# Patient Record
Sex: FEMALE | Race: WHITE | Employment: UNEMPLOYED | URBAN - METROPOLITAN AREA
[De-identification: names, ages, dates, MRNs, and addresses within clinical notes are randomized per-mention and may not be internally consistent; named-entity substitution may affect disease eponyms.]

---

## 2017-01-06 ENCOUNTER — HOSPITAL ENCOUNTER (OUTPATIENT)
Dept: LAB | Age: 77
Discharge: HOME | End: 2017-01-06

## 2017-07-19 RX ORDER — LISINOPRIL AND HYDROCHLOROTHIAZIDE 12.5; 2 MG/1; MG/1
TABLET ORAL
Qty: 90 TAB | Refills: 0 | Status: SHIPPED | OUTPATIENT
Start: 2017-07-19 | End: 2018-12-17 | Stop reason: SDUPTHER

## 2018-11-23 RX ORDER — LISINOPRIL AND HYDROCHLOROTHIAZIDE 12.5; 2 MG/1; MG/1
TABLET ORAL
Qty: 90 TAB | Refills: 0 | OUTPATIENT
Start: 2018-11-23

## 2018-11-23 NOTE — TELEPHONE ENCOUNTER
Called patient and spoke with her . Informed him that patient needed an appt to refill her medications and offered appts today, Monday or Tuesday. Patient's  states he will let her know and have her call back to schedule this appt.

## 2018-12-18 RX ORDER — LISINOPRIL AND HYDROCHLOROTHIAZIDE 12.5; 2 MG/1; MG/1
TABLET ORAL
Qty: 90 TAB | Refills: 0 | Status: SHIPPED | OUTPATIENT
Start: 2018-12-18 | End: 2020-11-16 | Stop reason: SDUPTHER

## 2020-07-31 ENCOUNTER — VIRTUAL VISIT (OUTPATIENT)
Dept: ONCOLOGY | Age: 80
End: 2020-07-31

## 2020-07-31 DIAGNOSIS — D72.828 OTHER ELEVATED WHITE BLOOD CELL (WBC) COUNT: ICD-10-CM

## 2020-07-31 DIAGNOSIS — R53.83 FATIGUE, UNSPECIFIED TYPE: ICD-10-CM

## 2020-07-31 DIAGNOSIS — I10 ESSENTIAL HYPERTENSION: ICD-10-CM

## 2020-07-31 DIAGNOSIS — D72.9 NEUTROPHILIA: ICD-10-CM

## 2020-07-31 DIAGNOSIS — Z00.00 HEALTHCARE MAINTENANCE: ICD-10-CM

## 2020-07-31 DIAGNOSIS — D72.828 OTHER ELEVATED WHITE BLOOD CELL (WBC) COUNT: Primary | ICD-10-CM

## 2020-07-31 NOTE — PROGRESS NOTES
[address] [facility] Oncology at [facility]  [phone]    Hematology / Oncology Consult    Reason for Visit:   Cinthya Talavera is a 68 y.o. female who is seen by synchronous (real-time) audio-video technology on 7/31/2020 in consultation at the request of Dr. Nader Moy for evaluation of elevated WBC. History of Present Illness:   Cinthya Talavera is a 68 y.o. female with HTN, Hyperlipidemia, allergic rhinitis, GERD comes in for evaluation of leukocytosis. Per PCP notes, pt has had fatigue for past 4 months. Recently was treated with UTI in late June 2020 treated with Amoxicillin. She also developed a rash in late June, which was treated steroid ointment, but no oral Prednisone. Dermatologist felt this was a heat rash. She does endorse yeast type rash under breast or in groin which comes and goes, not new. No unintentional weight loss, good appetite. No fevers, chills, night sweats. Had a recent chest CT which was negative for PE. Back in Feb 2020, she felt severely exhausted for 1 week, and the recovered. She wondered if she had COVID19 virus. Outside labs:   1/21/20: WBC 10.6, Hgb 13.8, MCV 92,   7/20/20: WBC 20.5, Hgb 13, MCV 91,   7/27/20: WBC 17.3, Hgb 12.4, MCV 95,     Past Medical History:   Diagnosis Date    Hypertension       Past Surgical History:   Procedure Laterality Date    HX HEENT      Appendectomy    HX ORTHOPAEDIC      Bilateral hip replacement   Had total hysterectomy for uterine prolapse in 2006 with pathology showing squamous metaplasia of cervix. Social History     Tobacco Use    Smoking status: Former Smoker    Smokeless tobacco: Never Used   Substance Use Topics    Alcohol use: Not Currently      No family history on file.   Current Outpatient Medications   Medication Sig    lisinopril-hydroCHLOROthiazide (PRINZIDE, ZESTORETIC) 20-12.5 mg per tablet TAKE 1 TABLET EVERY MORNING (NEED LAB WORK IN NEXT 3 MONTHS, PLEASE SCHEDULE) No current facility-administered medications for this visit. Not on File     Review of Systems: A complete review of systems was obtained, negative except as described above. Physical Exam:     Due to this being a TeleHealth evaluation, many elements of the physical examination are unable to be assessed. Constitutional: Appears well-developed and well-nourished in no apparent distress   Mental status: Alert and awake, Oriented to person/place/time, Able to follow commands  Eyes: EOM normal, Sclera normal, No visible ocular discharge  HENT: Normocephalic, atraumatic; Mouth/Throat: Moist mucous membranes, External Ears normal  Neck: No visualized mass  Pulmonary/Chest: Respiratory effort normal, No visualized signs of difficulty breathing or respiratory distress   Musculoskeletal: Normal gait with no signs of ataxia, Normal range of motion of neck  Neurological: No facial asymmetry (Cranial nerve 7 motor function), No gaze palsy  Skin: No significant exanthematous lesions or discoloration noted on facial skin  Psychiatric: Normal affect, normal judgment/insight. No hallucinations       Results:   No results found for: WBC, HGB, HCT, PLT, MCV, ANEU, HGBPOC, HCTPOC, HGBEXT, HCTEXT, PLTEXT  No results found for: NA, K, CL, CO2, GLU, BUN, CREA, GFRAA, GFRNA, CA, NAPOC, KPOCT, CLPOC, GLUCPOC, IBUN, CREAPOC, ICAI  No results found for: TBILI, ALT, AP, TP, ALB, GLOB  No results found for: IRON, FE, TIBC, IBCT, PSAT, FERR    No results found for: B12LT, FOL, RBCF  No results found for: TSH, TSH2, TSH3, TSHP, TSHEXT  No results found for: HAMAT, HAAB, HABT, HAAT, HBSAG, HBSB, HBSAT, HBABN, HBCM, HBCAB, HBCAT, XBCABS, HBEAB, HBEAG, XHEPCS, [id number], [facility], [facility], [facility], [id number], [id number], [address] [place] [address], [id number], HBCMLT, [id number], HCGAT      Imaging:     Radiology report(s) reviewed. .    Assessment & Plan:   Cinthya Talavera is a 68 y.o. female is seen for evaluation of leukocytosis.      1. Leukocytosis: Mostly neutrophils, with normal Hgb and PLT counts. No weight loss, fevers, sweats, but does have fatigue. While I feel this is most likely reactive due to inflammation or infection, it would be worthwhile to evaluate for myeloproliferative disorders. Have asked pt to do additional blood work at [address] [place] [address], peripheral smear, BCR/ABL, JAK2, ESR, CRP, LDH  -- Consider abdominal ultrasound in the future if WBC not improving.  -- Return in 3 weeks for virtual visit or in person to review results. 2. Fatigue: Unclear etiology, normal Hgb. Could be due to a viral illness, but no other clear symptoms. 3. HTN: Usually well controlled on current regimen. 4. Health maintenance: Last colonoscopy done 2016. Last mammogram 5/21/19. I appreciate the opportunity to participate in Ms. Florence oconnor. I was in the office while conducting this encounter. The patient was at her at home    Consent:  She and/or her healthcare decision maker is aware that this patient-initiated Telehealth encounter is a billable service, with coverage as determined by her insurance carrier. She is aware that she may receive a bill and has provided verbal consent to proceed: Yes    Pursuant to the emergency declaration under the [address] and the [facility], 1135 waiver authority and the [employer] Act, this Virtual  Visit was conducted, with patient's (and/or legal guardian's) consent, to reduce the patient's risk of exposure to COVID-19 and provide necessary medical care. Services were provided through a video synchronous discussion virtually to substitute for in-person visit.       Signed By: Yobani Talavera MD     July 31, 2020

## 2020-08-06 ENCOUNTER — TELEPHONE (OUTPATIENT)
Dept: ONCOLOGY | Age: 80
End: 2020-08-06

## 2020-08-06 NOTE — TELEPHONE ENCOUNTER
This came by email from Ashish Sandoval:      Maybe you could help me provide some information to Dr. Yuki Thomas that came up   during her tele visit with my wife Darian Denton. The medication was Nitrofurantoin that caused a allergic reaction when Alessia Ramos took it. Dr. Tricia Dubin nurse asked the question before the meeting but we had to look it up later. The second item was asked by Dr. Yuki Thomas regarding steroid use  and Alessia Ramos did receive   a prescription for Methyl-prednisolone 4mg dose pack on April 9 2019. Would appreciate it if you could get this to the Doctor or give me an address and I send it.        # [phone]

## 2020-08-17 LAB
BACKGROUND, 081113: NORMAL
BACKGROUND: 480503: NORMAL
BASOPHILS # BLD AUTO: 1.7 X10E3/UL (ref 0–0.2)
BASOPHILS NFR BLD AUTO: 9 %
CALR MUTATION DETECTION RESULT, 489451: NORMAL
CELLS ANALYZED: 100
CELLS COUNTED: 100
CHROM ANALY RESULT (ISCN): NORMAL
CLINICAL CYTOGENETICIST SPEC: NORMAL
CRP SERPL-MCNC: 2 MG/L (ref 0–10)
DIAGNOSTIC IMP SPEC-IMP: NORMAL
EOSINOPHIL # BLD AUTO: 0.7 X10E3/UL (ref 0–0.4)
EOSINOPHIL NFR BLD AUTO: 4 %
ERYTHROCYTE [DISTWIDTH] IN BLOOD BY AUTOMATED COUNT: 13.8 % (ref 11.7–15.4)
ERYTHROCYTE [SEDIMENTATION RATE] IN BLOOD BY WESTERGREN METHOD: 14 MM/HR (ref 0–40)
EXTRACTION: NORMAL
HCT VFR BLD AUTO: 37.3 % (ref 34–46.6)
HGB BLD-MCNC: 12.3 G/DL (ref 11.1–15.9)
IMMATURE CELLS, 115398: ABNORMAL
JAK2 P.V617F BLD/T QL: NORMAL
LAB DIRECTOR NAME PROVIDER: NORMAL
LDH SERPL-CCNC: 299 IU/L (ref 119–226)
LYMPHOCYTES # BLD AUTO: 2.2 X10E3/UL (ref 0.7–3.1)
LYMPHOCYTES NFR BLD AUTO: 12 %
MCH RBC QN AUTO: 30.3 PG (ref 26.6–33)
MCHC RBC AUTO-ENTMCNC: 33 G/DL (ref 31.5–35.7)
MCV RBC AUTO: 92 FL (ref 79–97)
METAMYELOCYTES NFR BLD: 3 % (ref 0–0)
MONOCYTES # BLD AUTO: 0 X10E3/UL (ref 0.1–0.9)
MONOCYTES NFR BLD AUTO: 0 %
MORPHOLOGY BLD-IMP: ABNORMAL
MPL GENE MUT TESTED BLD/T: NORMAL
MPL P.W515L+W515K+S505N BLD/T QL: NORMAL
NEUTROPHILS # BLD AUTO: 13.4 X10E3/UL (ref 1.4–7)
NEUTROPHILS NFR BLD AUTO: 72 %
PLATELET # BLD AUTO: 255 X10E3/UL (ref 150–450)
RBC # BLD AUTO: 4.06 X10E6/UL (ref 3.77–5.28)
REF LAB TEST METHOD: NORMAL
REFERENCES, 150293: NORMAL
REFERENCES, 150293: NORMAL
REFLEX: NORMAL
SERVICE CMNT-IMP: NORMAL
SPECIMEN SOURCE: NORMAL
WBC # BLD AUTO: 18.6 X10E3/UL (ref 3.4–10.8)

## 2020-08-18 NOTE — TELEPHONE ENCOUNTER
[facility] at [facility]  [phone]        08/18/20 9:26 AM Attempted to call patient via home number listed. Someone picked up phone, but then call went to Arnaud. \" Will attempt to call again later if no return call. Patient currently scheduled for follow up with Dr. Geo Price on 09/08. Dr. Geo Price has received some additional results and would like to see patient sooner--Thursday, 08/20 at 2 PM. Can also offer any other available time that day, except for 8 AM.     08/19/20 9:39 AM Spoke with patient and informed of above. She is agreeable to office visit tomorrow, patient scheduled. No further questions or concerns at this time.

## 2020-08-19 NOTE — PROGRESS NOTES
[address] [facility] Oncology at [address] [place] [address]  [phone]    Hematology / Oncology Established Visit    Reason for Visit:   Rogers Rodriguez is a 68 y.o. female who is seen for follow up of leukocytosis. PCP is Dr. Ayla Lane.     Hematology Oncology Treatment History:     Diagnosis: Chronic myelogenous leukemia (CML)    Stage: Pending    Pathology:   - 8/1/20 Peripheral blood BCR-ABL1 FISH: 84% of nuclei positive for BCR/ABL1 fusion    Prior Treatment: None    Current Treatment: pending    History of Present Illness:   Rogers Rodriguez is a 68 y.o. female with HTN, Hyperlipidemia, allergic rhinitis, GERD comes in for follow up of leukocytosis. Per PCP notes, pt has had fatigue for past 4 months. Recently was treated with UTI in late June 2020 treated with Amoxicillin. She also developed a rash in late June, which was treated steroid ointment, but no oral Prednisone. Dermatologist felt this was a heat rash. She does endorse yeast type rash under breast or in groin which comes and goes, not new. No unintentional weight loss, good appetite. No fevers, chills, night sweats. Had a recent chest CT which was negative for PE. Back in Feb 2020, she felt severely exhausted for 1 week, and the recovered. She wondered if she had COVID19 virus. Outside labs:   1/21/20: WBC 10.6, Hgb 13.8, MCV 92,   7/20/20: WBC 20.5, Hgb 13, MCV 91,   7/27/20: WBC 17.3, Hgb 12.4, MCV 95,     Interval History:  Patient is feeling well overall aside from some fatigue. No recent fevers, chills, sweats, weight loss, infections. Past Medical History:   Diagnosis Date    Hypertension       Past Surgical History:   Procedure Laterality Date    HX HEENT      Appendectomy    HX ORTHOPAEDIC      Bilateral hip replacement   Had total hysterectomy for uterine prolapse in 2006 with pathology showing squamous metaplasia of cervix.      Social History     Tobacco Use    Smoking status: Former Smoker    Smokeless tobacco: Never Used   Substance Use Topics    Alcohol use: Not Currently      No family history on file. Current Outpatient Medications   Medication Sig    lisinopril-hydroCHLOROthiazide (PRINZIDE, ZESTORETIC) 20-12.5 mg per tablet TAKE 1 TABLET EVERY MORNING (NEED LAB WORK IN NEXT 3 MONTHS, PLEASE SCHEDULE)     No current facility-administered medications for this visit. Not on File     Review of Systems: A complete review of systems was obtained, negative except as described above. Physical Exam:     Visit Vitals  /68 (BP 1 Location: Left arm, BP Patient Position: Sitting)   Pulse 66   Temp 97.8 °F (36.6 °C) (Temporal)   Ht 5' 1\" (1.549 m)   Wt 137 lb (62.1 kg)   SpO2 97%   BMI 25.89 kg/m²       ECOG PS: 1  General: Well developed, no acute distress  Eyes: PERRLA, EOMI, anicteric sclerae  HENT: Atraumatic, OP clear, TMs intact without erythema  Neck: Supple  Lymphatic: No cervical, supraclavicular, axillary or inguinal adenopathy  Respiratory: CTAB, normal respiratory effort  CV: Normal rate, regular rhythm, no murmurs, no peripheral edema  GI: Soft, nontender, nondistended, no masses, no hepatomegaly, no splenomegaly  MS: Normal gait and station. Digits without clubbing or cyanosis. Skin: No rashes, ecchymoses, or petechiae. Normal temperature, turgor, and texture. Neuro/Psych: Alert, oriented. 5/5 strength in all 4 extremities. Appropriate affect, normal judgment/insight      Results:     Lab Results   Component Value Date/Time    WBC 18.6 (H) 08/03/2020 01:23 PM    HGB 12.3 08/03/2020 01:23 PM    HCT 37.3 08/03/2020 01:23 PM    PLATELET 664 58/36/8490 01:23 PM    MCV 92 08/03/2020 01:23 PM    ABS.  NEUTROPHILS 13.4 (H) 08/03/2020 01:23 PM     No results found for: NA, K, CL, CO2, GLU, BUN, CREA, GFRAA, GFRNA, CA, NAPOC, KPOCT, CLPOC, GLUCPOC, IBUN, CREAPOC, ICAI  No results found for: TBILI, ALT, AP, TP, ALB, GLOB  No results found for: IRON, FE, TIBC, IBCT, PSAT, FERR    No results found for: B12LT, FOL, RBCF  No results found for: TSH, TSH2, TSH3, TSHP, TSHEXT, TSHEXT  No results found for: HAMAT, HAAB, HABT, HAAT, HBSAG, HBSB, HBSAT, HBABN, HBCM, HBCAB, HBCAT, XBCABS, HBEAB, HBEAG, XHEPCS, [id number], [address] [facility], [facility], [address] [place] [address], [address] [place] [address], [id number], [id number], [facility], [id number], HBCMLT, [id number], HCGAT      Imaging:     Radiology report(s) reviewed. .    Assessment & Plan:   Sol Infante is a 68 y.o. female is seen for evaluation of leukocytosis. 1. CML: Discussed with patient that this a slow growing leukemia and can be effectively treated with an oral medication in the tyrosine kinase inhibitor class of meds such as Imatinib, Dasatinib, etc. Side effects of these medications include nausea, diarrhea, constipation, rash, liver test abnormalities - most of which are generally troublesome in the first 1-2 months, but then can resolve or improve over time. Cardiovascular problems can occur after months or years of treatment. -- Printed patient information on CML for patient. -- Check BCR-ABL by PCR, CMP  -- Bone marrow biopsy  -- Abdominal ultrasound  -- Will plan to start Dasatinib in the near future. -- This medication should be taken with food. Avoid Tylenol, [place]'s wort and grapefruit juice. -- Return in 2-3 weeks for virtual visit to review results  -- Then return in early Oct 2020 for labs, MD visit    2. Fatigue: Likely due to CML. 3. HTN: Usually well controlled on current regimen. 4. Health maintenance: Last colonoscopy done 2016. Last mammogram 5/21/19. I appreciate the opportunity to participate in Ms. Stephens Officer care.       Signed By: Delia Hinojosa MD     August 20, 2020

## 2020-08-20 ENCOUNTER — OFFICE VISIT (OUTPATIENT)
Dept: ONCOLOGY | Age: 80
End: 2020-08-20
Payer: MEDICARE

## 2020-08-20 VITALS
HEART RATE: 66 BPM | OXYGEN SATURATION: 97 % | DIASTOLIC BLOOD PRESSURE: 68 MMHG | SYSTOLIC BLOOD PRESSURE: 158 MMHG | BODY MASS INDEX: 25.86 KG/M2 | HEIGHT: 61 IN | TEMPERATURE: 97.8 F | WEIGHT: 137 LBS

## 2020-08-20 DIAGNOSIS — C92.10 CML (CHRONIC MYELOID LEUKEMIA) (HCC): ICD-10-CM

## 2020-08-20 DIAGNOSIS — C92.10 CML (CHRONIC MYELOID LEUKEMIA) (HCC): Primary | ICD-10-CM

## 2020-08-20 LAB
ALBUMIN SERPL-MCNC: 4.2 G/DL (ref 3.5–5)
ALBUMIN/GLOB SERPL: 1.4 {RATIO} (ref 1.1–2.2)
ALP SERPL-CCNC: 61 U/L (ref 45–117)
ALT SERPL-CCNC: 29 U/L (ref 12–78)
ANION GAP SERPL CALC-SCNC: 6 MMOL/L (ref 5–15)
AST SERPL-CCNC: 18 U/L (ref 15–37)
BASOPHILS # BLD: 0.2 K/UL (ref 0–0.1)
BASOPHILS NFR BLD: 1 % (ref 0–1)
BILIRUB SERPL-MCNC: 0.3 MG/DL (ref 0.2–1)
BUN SERPL-MCNC: 15 MG/DL (ref 6–20)
BUN/CREAT SERPL: 24 (ref 12–20)
CALCIUM SERPL-MCNC: 10 MG/DL (ref 8.5–10.1)
CHLORIDE SERPL-SCNC: 102 MMOL/L (ref 97–108)
CO2 SERPL-SCNC: 29 MMOL/L (ref 21–32)
CREAT SERPL-MCNC: 0.62 MG/DL (ref 0.55–1.02)
DIFFERENTIAL METHOD BLD: ABNORMAL
EOSINOPHIL # BLD: 0.2 K/UL (ref 0–0.4)
EOSINOPHIL NFR BLD: 1 % (ref 0–7)
ERYTHROCYTE [DISTWIDTH] IN BLOOD BY AUTOMATED COUNT: 14.8 % (ref 11.5–14.5)
GLOBULIN SER CALC-MCNC: 2.9 G/DL (ref 2–4)
GLUCOSE SERPL-MCNC: 108 MG/DL (ref 65–100)
HCT VFR BLD AUTO: 39.6 % (ref 35–47)
HGB BLD-MCNC: 12.7 G/DL (ref 11.5–16)
IMM GRANULOCYTES # BLD AUTO: 0 K/UL
IMM GRANULOCYTES NFR BLD AUTO: 0 %
LYMPHOCYTES # BLD: 2.3 K/UL (ref 0.8–3.5)
LYMPHOCYTES NFR BLD: 11 % (ref 12–49)
MCH RBC QN AUTO: 30.6 PG (ref 26–34)
MCHC RBC AUTO-ENTMCNC: 32.1 G/DL (ref 30–36.5)
MCV RBC AUTO: 95.4 FL (ref 80–99)
MONOCYTES # BLD: 0.6 K/UL (ref 0–1)
MONOCYTES NFR BLD: 3 % (ref 5–13)
MYELOCYTES NFR BLD MANUAL: 3 %
NEUTS SEG # BLD: 17.2 K/UL (ref 1.8–8)
NEUTS SEG NFR BLD: 81 % (ref 32–75)
NRBC # BLD: 0 K/UL (ref 0–0.01)
NRBC BLD-RTO: 0 PER 100 WBC
PLATELET # BLD AUTO: 227 K/UL (ref 150–400)
PMV BLD AUTO: 11.7 FL (ref 8.9–12.9)
POTASSIUM SERPL-SCNC: 4.1 MMOL/L (ref 3.5–5.1)
PROT SERPL-MCNC: 7.1 G/DL (ref 6.4–8.2)
RBC # BLD AUTO: 4.15 M/UL (ref 3.8–5.2)
RBC MORPH BLD: ABNORMAL
SODIUM SERPL-SCNC: 137 MMOL/L (ref 136–145)
WBC # BLD AUTO: 21.2 K/UL (ref 3.6–11)

## 2020-08-20 PROCEDURE — G8432 DEP SCR NOT DOC, RNG: HCPCS | Performed by: INTERNAL MEDICINE

## 2020-08-20 PROCEDURE — G8400 PT W/DXA NO RESULTS DOC: HCPCS | Performed by: INTERNAL MEDICINE

## 2020-08-20 PROCEDURE — 99214 OFFICE O/P EST MOD 30 MIN: CPT | Performed by: INTERNAL MEDICINE

## 2020-08-20 PROCEDURE — 1101F PT FALLS ASSESS-DOCD LE1/YR: CPT | Performed by: INTERNAL MEDICINE

## 2020-08-20 PROCEDURE — G8427 DOCREV CUR MEDS BY ELIG CLIN: HCPCS | Performed by: INTERNAL MEDICINE

## 2020-08-20 PROCEDURE — G8419 CALC BMI OUT NRM PARAM NOF/U: HCPCS | Performed by: INTERNAL MEDICINE

## 2020-08-20 PROCEDURE — 1090F PRES/ABSN URINE INCON ASSESS: CPT | Performed by: INTERNAL MEDICINE

## 2020-08-20 PROCEDURE — G8536 NO DOC ELDER MAL SCRN: HCPCS | Performed by: INTERNAL MEDICINE

## 2020-08-20 RX ORDER — LANOLIN ALCOHOL/MO/W.PET/CERES
5000 CREAM (GRAM) TOPICAL DAILY
COMMUNITY

## 2020-08-20 RX ORDER — LANOLIN ALCOHOL/MO/W.PET/CERES
1 CREAM (GRAM) TOPICAL
COMMUNITY

## 2020-08-20 NOTE — PROGRESS NOTES
Felicita Hernandez is a 68 y.o. female here for follow up of leukocytosis. Patient with no complaints of pain at this time.

## 2020-08-26 LAB
BACKGROUND, BCR6T: NORMAL
INTERPRETATION: 480488: NORMAL
LAB DIRECTOR NAME PROVIDER: NORMAL
T(ABL1,BCR)B2A2/CONTROL BLD/T: 30.77 %
T(ABL1,BCR)B2A2/CONTROL BLD/T: NORMAL %
T(ABL1,BCR)B3A2/CONTROL BLD/T: 56.03 %
T(ABL1,BCR)E1A2/CONTROL BLD/T: 0.03 %

## 2020-08-26 NOTE — PROGRESS NOTES
[address] [facility] Oncology at [address] [place] [address]  [phone]    Hematology / Oncology Established Visit    Reason for Visit:   Shane Herrera is a 68 y.o. female who is seen by synchronous (real-time) audio-video technology on 9/10/2020 for follow up of CML. PCP is Dr. Caryn English.     Hematology Oncology Treatment History:     Diagnosis: Chronic myelogenous leukemia (CML)    Stage: Pending    Pathology:   - 8/1/20 Peripheral blood BCR-ABL1 FISH: 84% of nuclei positive for BCR/ABL1 fusion    -8/28/20 Bone marrow biopsy: Chronic myeloid leukemia, BCR-ABL1-positive, chronic phase   The bone marrow biopsy is 90% cellular and adequate for evaluation. The myeloid series appears hyperplastic and left-shifted with increased proportion of promyelocytes and myelocytes. Blasts do not appear increased. The erythroid series is reduced in number but progresses through the full maturation sequence without significant dysplasia. Megakaryocytes are increased in number with dwarf forms seen. Plasma cells and lymphocytes do not appear increased in number.   -Cytogenetics: Karyotype: 46,XX,t(9;22)(q34.1;q11.2)[20]; Interpretation: ABNORMAL FEMALE KARYOTYPE   The t(9;22)(q34.1;q11.2), resulting in formation of the [place] chromosome, is observed in 90-95% of cases with chronic myeloid leukemia, BCR-ABL1 positive. It is also observed in acute lymphoblastic leukemia/lymphoma and mixed phenotype acute leukemia (MPAL) with t(9;22)(q34.1;q11.2);BCR-ABL1 and in 1-2% of AML. These results should be nterpreted in the context of clinical and histopathologic findings. .    Prior Treatment: None    Current Treatment: To start Dasatinib 100mg PO daily 9/2020. History of Present Illness:   Shane Herrera is a 68 y.o. female with HTN, Hyperlipidemia, allergic rhinitis, GERD comes in for follow up of leukocytosis. Per PCP notes, pt has had fatigue for past 4 months.  Recently was treated with UTI in late June 2020 treated with Amoxicillin. She also developed a rash in late June, which was treated steroid ointment, but no oral Prednisone. Dermatologist felt this was a heat rash. She does endorse yeast type rash under breast or in groin which comes and goes, not new. No unintentional weight loss, good appetite. No fevers, chills, night sweats. Had a recent chest CT which was negative for PE. Back in Feb 2020, she felt severely exhausted for 1 week, and the recovered. She wondered if she had COVID19 virus. Outside labs:   1/21/20: WBC 10.6, Hgb 13.8, MCV 92,   7/20/20: WBC 20.5, Hgb 13, MCV 91,   7/27/20: WBC 17.3, Hgb 12.4, MCV 95,     Interval History:  Patient is feeling well overall aside from some fatigue. No recent fevers, chills, sweats, weight loss, infections. She is seen for review of BM biopsy results. Past Medical History:   Diagnosis Date    Hypertension       Past Surgical History:   Procedure Laterality Date    HX HEENT      Appendectomy    HX ORTHOPAEDIC      Bilateral hip replacement   Had total hysterectomy for uterine prolapse in 2006 with pathology showing squamous metaplasia of cervix. Social History     Tobacco Use    Smoking status: Former Smoker    Smokeless tobacco: Never Used   Substance Use Topics    Alcohol use: Not Currently      No family history on file. Current Outpatient Medications   Medication Sig    calcium citrate-vitamin d3 (CITRACAL+D) 315 mg-5 mcg (200 unit) tab Take 1 Tab by mouth daily (with breakfast).  cyanocobalamin 1,000 mcg tablet Take 5,000 mcg by mouth daily.  cranberry fruit concentrate (AZO CRANBERRY PO) Take  by mouth.  lisinopril-hydroCHLOROthiazide (PRINZIDE, ZESTORETIC) 20-12.5 mg per tablet TAKE 1 TABLET EVERY MORNING (NEED LAB WORK IN NEXT 3 MONTHS, PLEASE SCHEDULE)     No current facility-administered medications for this visit.        Allergies   Allergen Reactions    Macrobid [Nitrofurantoin Monohyd/M-Cryst] Other (comments) Red rash on arms and legs        Review of Systems: A complete review of systems was obtained, negative except as described above. Physical Exam:     There were no vitals taken for this visit. Due to this being a TeleHealth evaluation, many elements of the physical examination are unable to be assessed. Constitutional: Appears well-developed and well-nourished in no apparent distress   Mental status: Alert and awake, Oriented to person/place/time, Able to follow commands  Eyes: EOM normal, Sclera normal, No visible ocular discharge  HENT: Normocephalic, atraumatic; Mouth/Throat: Moist mucous membranes, External Ears normal  Neck: No visualized mass  Pulmonary/Chest: Respiratory effort normal, No visualized signs of difficulty breathing or respiratory distress   Musculoskeletal: Normal gait with no signs of ataxia, Normal range of motion of neck  Neurological: No facial asymmetry (Cranial nerve 7 motor function), No gaze palsy  Skin: No significant exanthematous lesions or discoloration noted on facial skin  Psychiatric: Normal affect, normal judgment/insight. No hallucinations       Results:     Lab Results   Component Value Date/Time    WBC 21.2 (H) 08/20/2020 02:56 PM    HGB 12.7 08/20/2020 02:56 PM    HCT 39.6 08/20/2020 02:56 PM    PLATELET 925 45/26/7774 02:56 PM    MCV 95.4 08/20/2020 02:56 PM    ABS. NEUTROPHILS 17.2 (H) 08/20/2020 02:56 PM     Lab Results   Component Value Date/Time    Sodium 137 08/20/2020 02:56 PM    Potassium 4.1 08/20/2020 02:56 PM    Chloride 102 08/20/2020 02:56 PM    CO2 29 08/20/2020 02:56 PM    Glucose 108 (H) 08/20/2020 02:56 PM    BUN 15 08/20/2020 02:56 PM    Creatinine 0.62 08/20/2020 02:56 PM    GFR est AA >60 08/20/2020 02:56 PM    GFR est non-AA >60 08/20/2020 02:56 PM    Calcium 10.0 08/20/2020 02:56 PM     Lab Results   Component Value Date/Time    Bilirubin, total 0.3 08/20/2020 02:56 PM    ALT (SGPT) 29 08/20/2020 02:56 PM    Alk.  phosphatase 61 08/20/2020 02:56 PM    Protein, total 7.1 2020 02:56 PM    Albumin 4.2 2020 02:56 PM    Globulin 2.9 2020 02:56 PM     No results found for: IRON, FE, TIBC, IBCT, PSAT, FERR    No results found for: B12LT, FOL, RBCF  No results found for: TSH, TSH2, TSH3, TSHP, TSHEXT, TSHEXT  No results found for: HAMAT, HAAB, HABT, HAAT, HBSAG, HBSB, HBSAT, HBABN, HBCM, HBCAB, HBCAT, XBCABS, [facility], [facility], XHEPCS, [id number], [facility], [facility], [facility], [facility], [id number], [id number], [address] [place] [address], [id number], HBCMLT, [id number], HCGAT     20: POSITIVE for the BCR-ABL1:  e13a2 (b2a2, p210) 30.77; e14a2 (b3a2, p210) 56.03; e1a2 (p190) 0.03 fusion transcripts. Imagin/28/20 abdomen ultrasound:  IMPRESSION: Hepatic steatosis. No evidence for hepatosplenomegaly    Assessment & Plan:   Jas Eric is a 68 y.o. female is seen for evaluation of leukocytosis. 1. CML: Sokal score intermediate. Discussed with patient that this a slow growing leukemia and can be effectively treated with an oral medication in the tyrosine kinase inhibitor class of meds such as Imatinib, Dasatinib, etc. Side effects of these medications include nausea, diarrhea, constipation, rash, liver test abnormalities, most of which are generally troublesome in the first 1-2 months, but then can resolve or improve over time. Cardiovascular problems can occur after months or years of treatment. Previously printed patient information on CML for patient. -- Start Dasatinib 100mg PO daily. Can consider dose escalation to 140mg/d if not achieving heme or cytogenetic response. (sent to [facility] [address]. )     -- This medication should be taken with food. Avoid Tylenol, [place]'s wort and grapefruit juice. -- Return in 4 weeks for labs and MD follow up  -- At the next visit, will order CBC, CMP, Bcr/abl PCR for 6 weeks later. 2. Fatigue: Likely due to CML and hopeful to improve. 3. HTN: Usually well controlled on current regimen.      4. Health maintenance: Last colonoscopy done 2016. Last mammogram 5/21/19. I appreciate the opportunity to participate in Ms. [facility]. Total physician time spent on this encounter was 40 minutes. I was in the office while conducting this encounter. The patient was at her at home    Consent:  She and/or her healthcare decision maker is aware that this patient-initiated Telehealth encounter is a billable service, with coverage as determined by her insurance carrier. She is aware that she may receive a bill and has provided verbal consent to proceed: Yes    Pursuant to the emergency declaration under the [address] and the [facility] 1135 waiver authority and the [employer] Act, this Virtual  Visit was conducted, with patient's (and/or legal guardian's) consent, to reduce the patient's risk of exposure to COVID-19 and provide necessary medical care. Services were provided through a video synchronous discussion virtually to substitute for in-person visit.       Signed By: Clemencia Ruffin MD     September 10, 2020

## 2020-08-28 ENCOUNTER — HOSPITAL ENCOUNTER (OUTPATIENT)
Dept: CT IMAGING | Age: 80
Discharge: HOME | End: 2020-08-28
Attending: INTERNAL MEDICINE
Payer: MEDICARE

## 2020-08-28 ENCOUNTER — HOSPITAL ENCOUNTER (OUTPATIENT)
Dept: ULTRASOUND IMAGING | Age: 80
Discharge: HOME | End: 2020-08-28
Attending: INTERNAL MEDICINE
Payer: MEDICARE

## 2020-08-28 VITALS
DIASTOLIC BLOOD PRESSURE: 41 MMHG | RESPIRATION RATE: 21 BRPM | HEART RATE: 73 BPM | OXYGEN SATURATION: 99 % | SYSTOLIC BLOOD PRESSURE: 109 MMHG

## 2020-08-28 DIAGNOSIS — C92.10 CML (CHRONIC MYELOID LEUKEMIA) (HCC): ICD-10-CM

## 2020-08-28 LAB
BASOPHILS # BLD: 0.8 K/UL (ref 0–0.1)
BASOPHILS NFR BLD: 4 % (ref 0–1)
DIFFERENTIAL METHOD BLD: ABNORMAL
EOSINOPHIL # BLD: 0.2 K/UL (ref 0–0.4)
EOSINOPHIL NFR BLD: 1 % (ref 0–7)
ERYTHROCYTE [DISTWIDTH] IN BLOOD BY AUTOMATED COUNT: 14.8 % (ref 11.5–14.5)
HCT VFR BLD AUTO: 40.2 % (ref 35–47)
HGB BLD-MCNC: 13.3 G/DL (ref 11.5–16)
IMM GRANULOCYTES # BLD AUTO: 0 K/UL
IMM GRANULOCYTES NFR BLD AUTO: 0 %
LYMPHOCYTES # BLD: 3.1 K/UL (ref 0.8–3.5)
LYMPHOCYTES NFR BLD: 16 % (ref 12–49)
MCH RBC QN AUTO: 30.9 PG (ref 26–34)
MCHC RBC AUTO-ENTMCNC: 33.1 G/DL (ref 30–36.5)
MCV RBC AUTO: 93.5 FL (ref 80–99)
MONOCYTES # BLD: 0.4 K/UL (ref 0–1)
MONOCYTES NFR BLD: 2 % (ref 5–13)
MYELOCYTES NFR BLD MANUAL: 3 %
NEUTS SEG # BLD: 14.5 K/UL (ref 1.8–8)
NEUTS SEG NFR BLD: 74 % (ref 32–75)
NRBC # BLD: 0 K/UL (ref 0–0.01)
NRBC BLD-RTO: 0 PER 100 WBC
PLATELET # BLD AUTO: 351 K/UL (ref 150–400)
PMV BLD AUTO: 10.7 FL (ref 8.9–12.9)
RBC # BLD AUTO: 4.3 M/UL (ref 3.8–5.2)
RBC MORPH BLD: ABNORMAL
WBC # BLD AUTO: 19.6 K/UL (ref 3.6–11)
WBC MORPH BLD: ABNORMAL

## 2020-08-28 PROCEDURE — 99152 MOD SED SAME PHYS/QHP 5/>YRS: CPT

## 2020-08-28 PROCEDURE — 88184 FLOWCYTOMETRY/ TC 1 MARKER: CPT

## 2020-08-28 PROCEDURE — 88237 TISSUE CULTURE BONE MARROW: CPT

## 2020-08-28 PROCEDURE — 88264 CHROMOSOME ANALYSIS 20-25: CPT

## 2020-08-28 PROCEDURE — 88311 DECALCIFY TISSUE: CPT

## 2020-08-28 PROCEDURE — 76700 US EXAM ABDOM COMPLETE: CPT

## 2020-08-28 PROCEDURE — 88313 SPECIAL STAINS GROUP 2: CPT

## 2020-08-28 PROCEDURE — 74011250636 HC RX REV CODE- 250/636: Performed by: INTERNAL MEDICINE

## 2020-08-28 PROCEDURE — 88185 FLOWCYTOMETRY/TC ADD-ON: CPT

## 2020-08-28 PROCEDURE — 74011250636 HC RX REV CODE- 250/636: Performed by: RADIOLOGY

## 2020-08-28 PROCEDURE — 38221 DX BONE MARROW BIOPSIES: CPT

## 2020-08-28 PROCEDURE — 85025 COMPLETE CBC W/AUTO DIFF WBC: CPT

## 2020-08-28 PROCEDURE — 88342 IMHCHEM/IMCYTCHM 1ST ANTB: CPT

## 2020-08-28 PROCEDURE — 36415 COLL VENOUS BLD VENIPUNCTURE: CPT

## 2020-08-28 PROCEDURE — 88305 TISSUE EXAM BY PATHOLOGIST: CPT

## 2020-08-28 RX ORDER — MIDAZOLAM HYDROCHLORIDE 1 MG/ML
.5-5 INJECTION, SOLUTION INTRAMUSCULAR; INTRAVENOUS
Status: DISCONTINUED | OUTPATIENT
Start: 2020-08-28 | End: 2020-08-28

## 2020-08-28 RX ORDER — FENTANYL CITRATE 50 UG/ML
100 INJECTION, SOLUTION INTRAMUSCULAR; INTRAVENOUS
Status: DISCONTINUED | OUTPATIENT
Start: 2020-08-28 | End: 2020-08-28

## 2020-08-28 RX ORDER — MIDAZOLAM HYDROCHLORIDE 5 MG/ML
5 INJECTION INTRAMUSCULAR; INTRAVENOUS
Status: DISCONTINUED | OUTPATIENT
Start: 2020-08-28 | End: 2020-08-28

## 2020-08-28 RX ADMIN — MIDAZOLAM HYDROCHLORIDE 1 MG: 1 INJECTION, SOLUTION INTRAMUSCULAR; INTRAVENOUS at 09:59

## 2020-08-28 RX ADMIN — FENTANYL CITRATE 25 MCG: 50 INJECTION, SOLUTION INTRAMUSCULAR; INTRAVENOUS at 10:08

## 2020-08-28 RX ADMIN — FENTANYL CITRATE 25 MCG: 50 INJECTION, SOLUTION INTRAMUSCULAR; INTRAVENOUS at 10:05

## 2020-08-28 RX ADMIN — FENTANYL CITRATE 25 MCG: 50 INJECTION, SOLUTION INTRAMUSCULAR; INTRAVENOUS at 09:59

## 2020-08-28 RX ADMIN — MIDAZOLAM HYDROCHLORIDE 1 MG: 1 INJECTION, SOLUTION INTRAMUSCULAR; INTRAVENOUS at 10:05

## 2020-08-28 RX ADMIN — MIDAZOLAM HYDROCHLORIDE 1 MG: 1 INJECTION, SOLUTION INTRAMUSCULAR; INTRAVENOUS at 10:08

## 2020-08-28 NOTE — PROGRESS NOTES
0900  Pt brought back to [facility] for scheduled CT guided marrow biopsy. Confirmed NPO and ride. 0915  IV placed and labs drawn and sent to lab. Irene Ramirez in [facility] to discuss procedure and sedation plan with pt,  and RN. Allergies reviewed. Consent signed. 0950 Pt brought to CT for procedure. Sedation started at 0959. Time out performed at  1008. Procedure started at 1009 and ended at 1018. Pt received a total of  3mg of versed and 75mcg of fentanyl over the course of procedure. Pt tolerated procedure well. Denies pain. Dressing to procedure site CDI. VS monitored throughout procedure. 1025  Pt returned to [facility]. Given crackers and coffee. [phone] with ,Harjit, to let him know procedure complete and pt doing well. 1110 Reviewed discharge instructions with patient. Opportunity given for questions. Pt verbalized understanding. Copy provided. IV removed. Pt ambulated to  to get dressed. 112  Escorted pt out to vehicle in w/c for discharge to home with .

## 2020-08-28 NOTE — DISCHARGE INSTRUCTIONS
Bone Marrow Aspiration and Biopsy: What to Expect at Home  Your Recovery  The biopsy site may feel sore for several days. It can help to walk, take pain medicine, and put ice packs on the site. You will probably be able to return to work and your usual activities the day after the procedure. Your doctor or nurse will call you with the results of your test.  This care sheet gives you a general idea about how long it will take for you to recover. But each person recovers at a different pace. Follow the steps below to get better as quickly as possible. How can you care for yourself at home? Activity    · Rest when you feel tired. Getting enough sleep will help you recover. · You may drive when you are no longer taking pain pills and can quickly move your foot from the gas pedal to the brake. You must also be able to sit comfortably for a long period of time, even if you do not plan to go far. You might get caught in traffic. · Most people are able to return to work the day after the procedure. Medicines    · Your doctor will tell you if and when you can restart your medicines. He or she will also give you instructions about taking any new medicines. · If you take blood thinners, such as warfarin (Coumadin), clopidogrel (Plavix), or aspirin, be sure to talk to your doctor. He or she will tell you if and when to start taking those medicines again. Make sure that you understand exactly what your doctor wants you to do. · Be safe with medicines. Take pain medicines exactly as directed. ? If the doctor gave you a prescription medicine for pain, take it as prescribed. ? If you are not taking a prescription pain medicine, take an over-the-counter medicine such as acetaminophen (Tylenol), ibuprofen (Advil, Motrin), or naproxen (Aleve). Read and follow all instructions on the label. ? Do not take two or more pain medicines at the same time unless the doctor told you to.  Many pain medicines have acetaminophen, which is Tylenol. Too much acetaminophen (Tylenol) can be harmful. · If you think your pain medicine is making you sick to your stomach:  ? Take your medicine after meals (unless your doctor has told you not to). ? Ask your doctor for a different pain medicine. · If your doctor prescribed antibiotics, take them as directed. Do not stop taking them just because you feel better. Ice    · Put ice or a cold pack on the biopsy site for 10 to 20 minutes at a time. Put a thin cloth between the ice and your skin. Follow-up care is a key part of your treatment and safety. Be sure to make and go to all appointments, and call your doctor if you are having problems. It's also a good idea to know your test results and keep a list of the medicines you take. When should you call for help? Call 911 anytime you think you may need emergency care. For example, call if:    · You passed out (lost consciousness). Call your doctor now or seek immediate medical care if:    · You have signs of infection, such as:  ? Increased pain, swelling, warmth, or redness. ? Red streaks leading from the biopsy site. ? Pus draining from the biopsy site. ? Swollen lymph nodes in your neck, armpits, or groin. ? A fever. Watch closely for any changes in your health, and be sure to contact your doctor if:    · You are not getting better as expected. Where can you learn more? Go to http://vince-divina.info/. Enter E148 in the search box to learn more about \"Bone Marrow Aspiration and Biopsy: What to Expect at Home. \"  Current as of: September 10, 2017  Content Version: 11.8  © 8171-0022 Healthwise, Incorporated. Care instructions adapted under license by [employer] (which disclaims liability or warranty for this information).  If you have questions about a medical condition or this instruction, always ask your healthcare professional. [facility] disclaims any warranty or liability for your use of this information. If you have any questions or concerns please call [employer] at [phone] between the hours of 7:00 am and 3:30 pm or after hours call [phone]. Julee Amador RN, [facility]  Sedation for a Medical Procedure: After Your Visit  Instructions. Sedatives are used to relax you for a procedure. You may or may not be awake during the procedure. Common side effects of sedation medications include:                   Drowsiness, dizziness, euphoria, sleepiness or confusion. I              Unsteady gait. Loss of fine muscle control and delayed reaction time. Visual disturbances, difficulty focusing and blurred vision. Impaired memory recall. Follow-up care is a key component for your health and safety. Be sure to make and go to all medical appointments. Call your doctor if you are having problems. It's also a good idea to keep a list of your allergies, medicines with doses and test results on hand    Home care following your sedation procedure: You may experience some of these side effects or you may not be aware of subtle changes in your behavior or reaction time. Because you received these medications, we are giving you the following instructions: Activity    For your safety, you should not drive until the medicine wears off and you can think clearly and react easily. Do not drive for 24 hours.  Rest when you feel tired. Getting enough sleep will help you recover. Diet     You can eat your normal diet. If your stomach is upset, try bland, low-fat foods like plain rice, broiled chicken, toast, and yogurt.  Drink plenty of fluids unless your doctor advised you to limit your fluids.  Do not consume alcoholic beverages for 24 hours. Instructions   Do not make important personal, business, or legal decisions for 24 hours.  Move slowly and carefully, do not make sudden position changes.    Be alert for dizziness or lightheadedness and move accordingly.  Have a responsible person assist you.  Do not drive for 24 hours.  Do not operate equipment for 24 hours. [employer] mowers, power tools, kitchen appliances, etc.)    Discharge medications:   Resume prior to test medications as prescribed by your personal physician. If you have any questions or concerns call Radiology RN at [phone]  After hours call Radiology on-call at [phone], [facility]      Call 911 any time you think you may need emergency care. For example:                Call if you passed out (lost consciousness).  The medicine is not wearing off and you cannot think clearly. Watch closely for changes in your health, and be sure to contact your doctor if you have any problems. Where can you learn more? Go to [employer]be   Enter G817 in the search box to learn more about \"Sedation for a Medical Procedure: After Your Visit. \"

## 2020-09-10 ENCOUNTER — VIRTUAL VISIT (OUTPATIENT)
Dept: ONCOLOGY | Age: 80
End: 2020-09-10
Payer: MEDICARE

## 2020-09-10 DIAGNOSIS — C92.10 CML (CHRONIC MYELOCYTIC LEUKEMIA) (HCC): Primary | ICD-10-CM

## 2020-09-10 DIAGNOSIS — R53.83 FATIGUE, UNSPECIFIED TYPE: ICD-10-CM

## 2020-09-10 DIAGNOSIS — D72.828 OTHER ELEVATED WHITE BLOOD CELL (WBC) COUNT: ICD-10-CM

## 2020-09-10 DIAGNOSIS — I10 ESSENTIAL HYPERTENSION: ICD-10-CM

## 2020-09-10 PROCEDURE — G8432 DEP SCR NOT DOC, RNG: HCPCS | Performed by: INTERNAL MEDICINE

## 2020-09-10 PROCEDURE — 1101F PT FALLS ASSESS-DOCD LE1/YR: CPT | Performed by: INTERNAL MEDICINE

## 2020-09-10 PROCEDURE — G8400 PT W/DXA NO RESULTS DOC: HCPCS | Performed by: INTERNAL MEDICINE

## 2020-09-10 PROCEDURE — 99215 OFFICE O/P EST HI 40 MIN: CPT | Performed by: INTERNAL MEDICINE

## 2020-09-10 PROCEDURE — G8427 DOCREV CUR MEDS BY ELIG CLIN: HCPCS | Performed by: INTERNAL MEDICINE

## 2020-09-10 PROCEDURE — 1090F PRES/ABSN URINE INCON ASSESS: CPT | Performed by: INTERNAL MEDICINE

## 2020-09-11 NOTE — TELEPHONE ENCOUNTER
[facility] at [place]  [phone]        09/11/20 3:56 PM Received correspondence from [facility] stating that they are unable to fill patient's Sprycel. [facility] advised that office contact [employer] at [phone] to verify which specialty pharmacy to send medication to. Unable to verify which specialty pharmacy to send medication--was transferred to two separate departments who advised they were unable to assist and this nurse on call for 40 minutes. This nurse was unable to continue call, will follow up on Monday.

## 2020-09-16 ENCOUNTER — TELEPHONE (OUTPATIENT)
Dept: ONCOLOGY | Age: 80
End: 2020-09-16

## 2020-09-16 NOTE — TELEPHONE ENCOUNTER
Left a vm that her medication espirica?  100 mg   Needs a PA  Phone [phone]    Ref number [id number]

## 2020-09-24 ENCOUNTER — TELEPHONE (OUTPATIENT)
Dept: ONCOLOGY | Age: 80
End: 2020-09-24

## 2020-09-24 DIAGNOSIS — C92.10 CML (CHRONIC MYELOCYTIC LEUKEMIA) (HCC): Primary | ICD-10-CM

## 2020-09-24 DIAGNOSIS — C92.10 CML (CHRONIC MYELOCYTIC LEUKEMIA) (HCC): ICD-10-CM

## 2020-09-24 NOTE — TELEPHONE ENCOUNTER
[address] at [place]  [phone]        09/24/20 4:06 PM Spoke with patient's , Vasiliy Runner (listed on PHI). He verified that patient has not received sprycel yet. Updated Junior Runner that printed prescription was signed and forwarded to Yohannes Ortega, case management assistant, to submit to drug company. Junior Runner requesting update and inquiring what to anticipate from here regarding delivery of medication. Advised this nurse would forward above to Yohannes Ortega for return call. He verbalized understanding.

## 2020-09-24 NOTE — TELEPHONE ENCOUNTER
Pt calling about a medication they received I assume in mail ---they dont know anything about this medication--they dont undertstand it ---  They state they had been still waiting for assistant

## 2020-09-24 NOTE — TELEPHONE ENCOUNTER
[facility]  [phone]        09/24/20 2:06 PM Printed prescription for Dr. Naga Mckenzie to review and sign.

## 2020-09-25 NOTE — TELEPHONE ENCOUNTER
[facility] patient assistance to inquire status of patients application. Representative stated it is still in process. Requested this to be expedited and representative stated it is marked expedited and they will inform the office once determination is made. Called patient and spoke to Sharmaine Cortes (patients ) to inform them of the above information and that once a determination is made he and the patient will be contacted. Sharmaine Cortes verbalized understanding. No further questions or concerns at this time. Received fax from [address] [place] [address] that patient is approved for assistance for Sprycel and they will reach out to patient to set up delivery. Received call from patients , Sharmaine Cortes that Brandon [facility] called and stated they were told patient is approved and delivery is set up for Monday, 9/28/2020 for Sprycel with a cost of care of $0 and no further financial assistance needed at this time.

## 2020-10-19 ENCOUNTER — TRANSCRIBE ORDER (OUTPATIENT)
Dept: SCHEDULING | Age: 80
End: 2020-10-19

## 2020-10-19 ENCOUNTER — OFFICE VISIT (OUTPATIENT)
Dept: ONCOLOGY | Age: 80
End: 2020-10-19
Payer: MEDICARE

## 2020-10-19 ENCOUNTER — HOSPITAL ENCOUNTER (OUTPATIENT)
Dept: NON INVASIVE DIAGNOSTICS | Age: 80
Discharge: HOME | End: 2020-10-19
Payer: MEDICARE

## 2020-10-19 VITALS
HEART RATE: 74 BPM | HEIGHT: 61 IN | TEMPERATURE: 98.3 F | SYSTOLIC BLOOD PRESSURE: 168 MMHG | WEIGHT: 146.8 LBS | DIASTOLIC BLOOD PRESSURE: 74 MMHG | OXYGEN SATURATION: 96 % | BODY MASS INDEX: 27.72 KG/M2

## 2020-10-19 DIAGNOSIS — R22.42 LEG MASS, LEFT: ICD-10-CM

## 2020-10-19 DIAGNOSIS — L27.0 DRUG RASH: ICD-10-CM

## 2020-10-19 DIAGNOSIS — Z85.6 PERSONAL HISTORY OF UNSPECIFIED LEUKEMIA: Primary | ICD-10-CM

## 2020-10-19 DIAGNOSIS — R06.02 SHORTNESS OF BREATH: ICD-10-CM

## 2020-10-19 DIAGNOSIS — J81.0 ACUTE PULMONARY EDEMA (HCC): ICD-10-CM

## 2020-10-19 DIAGNOSIS — R60.0 BILATERAL LOWER EXTREMITY EDEMA: ICD-10-CM

## 2020-10-19 DIAGNOSIS — C92.10 CML (CHRONIC MYELOCYTIC LEUKEMIA) (HCC): ICD-10-CM

## 2020-10-19 DIAGNOSIS — C92.10 CML (CHRONIC MYELOCYTIC LEUKEMIA) (HCC): Primary | ICD-10-CM

## 2020-10-19 PROCEDURE — 1090F PRES/ABSN URINE INCON ASSESS: CPT | Performed by: INTERNAL MEDICINE

## 2020-10-19 PROCEDURE — G8419 CALC BMI OUT NRM PARAM NOF/U: HCPCS | Performed by: INTERNAL MEDICINE

## 2020-10-19 PROCEDURE — 93005 ELECTROCARDIOGRAM TRACING: CPT

## 2020-10-19 PROCEDURE — G8427 DOCREV CUR MEDS BY ELIG CLIN: HCPCS | Performed by: INTERNAL MEDICINE

## 2020-10-19 PROCEDURE — G8536 NO DOC ELDER MAL SCRN: HCPCS | Performed by: INTERNAL MEDICINE

## 2020-10-19 PROCEDURE — 1101F PT FALLS ASSESS-DOCD LE1/YR: CPT | Performed by: INTERNAL MEDICINE

## 2020-10-19 PROCEDURE — 99214 OFFICE O/P EST MOD 30 MIN: CPT | Performed by: INTERNAL MEDICINE

## 2020-10-19 PROCEDURE — G8400 PT W/DXA NO RESULTS DOC: HCPCS | Performed by: INTERNAL MEDICINE

## 2020-10-19 PROCEDURE — G8432 DEP SCR NOT DOC, RNG: HCPCS | Performed by: INTERNAL MEDICINE

## 2020-10-19 RX ORDER — FUROSEMIDE 20 MG/1
20 TABLET ORAL DAILY
Qty: 30 TAB | Refills: 0 | Status: SHIPPED | OUTPATIENT
Start: 2020-10-19 | End: 2021-11-30

## 2020-10-19 NOTE — PATIENT INSTRUCTIONS
I have ordered the following tests:    - Blood work today  - EKG today  - Echo of the heart - you will get a call for this appt  - LE dopplers (Dr. Gonzalez Breeding ordered - you will get a call)  - Referral to Cardiologist (you will get a call)

## 2020-10-19 NOTE — PROGRESS NOTES
Berhane Duran is a 68 y.o. female here for follow up of CML. Patient with no complaints of pain at this time.

## 2020-10-19 NOTE — PROGRESS NOTES
[address] [facility]  [phone]    Hematology / Oncology Established Visit    Reason for Visit:   Berhane Duran is a 68 y.o. female who is seen by synchronous (real-time) audio-video technology on 9/10/2020 for follow up of CML. PCP is Dr. Jim Fay.     Hematology Oncology Treatment History:     Diagnosis: Chronic myelogenous leukemia (CML)    Stage: Pending    Pathology:   - 8/1/20 Peripheral blood BCR-ABL1 FISH: 84% of nuclei positive for BCR/ABL1 fusion    -8/28/20 Bone marrow biopsy: Chronic myeloid leukemia, BCR-ABL1-positive, chronic phase   The bone marrow biopsy is 90% cellular and adequate for evaluation. The myeloid series appears hyperplastic and left-shifted with increased proportion of promyelocytes and myelocytes. Blasts do not appear increased. The erythroid series is reduced in number but progresses through the full maturation sequence without significant dysplasia. Megakaryocytes are increased in number with dwarf forms seen. Plasma cells and lymphocytes do not appear increased in number.   -Cytogenetics: Karyotype: 46,XX,t(9;22)(q34.1;q11.2)[20]; Interpretation: ABNORMAL FEMALE KARYOTYPE   The t(9;22)(q34.1;q11.2), resulting in formation of the [employer] HSPTL chromosome, is observed in 90-95% of cases with chronic myeloid leukemia, BCR-ABL1 positive. It is also observed in acute lymphoblastic leukemia/lymphoma and mixed phenotype acute leukemia (MPAL) with t(9;22)(q34.1;q11.2);BCR-ABL1 and in 1-2% of AML. These results should be nterpreted in the context of clinical and histopathologic findings. .    Prior Treatment: None    Current Treatment: Dasatinib 100mg PO daily, started 9/29/2020. History of Present Illness:   Berhane Duran is a 68 y.o. female with HTN, Hyperlipidemia, allergic rhinitis, GERD comes in for follow up of leukocytosis. Per PCP notes, pt has had fatigue for past 4 months.  Recently was treated with UTI in late June 2020 treated with Amoxicillin. She also developed a rash in late June, which was treated steroid ointment, but no oral Prednisone. Dermatologist felt this was a heat rash. She does endorse yeast type rash under breast or in groin which comes and goes, not new. No unintentional weight loss, good appetite. No fevers, chills, night sweats. Had a recent chest CT which was negative for PE. Back in Feb 2020, she felt severely exhausted for 1 week, and the recovered. She wondered if she had COVID19 virus. Outside labs:   1/21/20: WBC 10.6, Hgb 13.8, MCV 92,   7/20/20: WBC 20.5, Hgb 13, MCV 91,   7/27/20: WBC 17.3, Hgb 12.4, MCV 95,     Interval History:  Patient is seen for urgent visit given SOB, weight gain, leg swelling. No recent fevers, chills, sweats, weight loss, infections. She started Dasatinib on 9/29/20 (2.5 weeks ago) and has been dealing with fatigue, erythematous rash and now weight gain since starting med. She does have some orthopnea. Coughing for the past 2 months. Past Medical History:   Diagnosis Date    Hypertension       Past Surgical History:   Procedure Laterality Date    HX HEENT      Appendectomy    HX ORTHOPAEDIC      Bilateral hip replacement   Had total hysterectomy for uterine prolapse in 2006 with pathology showing squamous metaplasia of cervix. Social History     Tobacco Use    Smoking status: Former Smoker    Smokeless tobacco: Never Used   Substance Use Topics    Alcohol use: Not Currently      History reviewed. No pertinent family history. Current Outpatient Medications   Medication Sig    dasatinib (SPRYCEL) 100 mg tab Take 100 mg by mouth daily (with breakfast).  calcium citrate-vitamin d3 (CITRACAL+D) 315 mg-5 mcg (200 unit) tab Take 1 Tab by mouth daily (with breakfast).  cyanocobalamin 1,000 mcg tablet Take 5,000 mcg by mouth daily.  cranberry fruit concentrate (AZO CRANBERRY PO) Take  by mouth.     lisinopril-hydroCHLOROthiazide (PRINZIDE, ZESTORETIC) 20-12.5 mg per tablet TAKE 1 TABLET EVERY MORNING (NEED LAB WORK IN NEXT 3 MONTHS, PLEASE SCHEDULE)     No current facility-administered medications for this visit. Allergies   Allergen Reactions    Macrobid [Nitrofurantoin Monohyd/M-Cryst] Other (comments)     Red rash on arms and legs        Review of Systems: A complete review of systems was obtained, negative except as described above. Physical Exam:     Visit Vitals  BP (!) 168/74 (BP 1 Location: Left arm, BP Patient Position: Sitting)   Pulse 74   Temp 98.3 °F (36.8 °C) (Temporal)   Ht 5' 1\" (1.549 m)   Wt 146 lb 12.8 oz (66.6 kg)   SpO2 96%   BMI 27.74 kg/m²     ECOG PS: 1  General: Well developed, no acute distress  Eyes: PERRLA, EOMI, anicteric sclerae  HENT: Atraumatic, OP clear, TMs intact without erythema  Neck: Supple  Lymphatic: No cervical, supraclavicular, axillary or inguinal adenopathy  Respiratory: CTAB, normal respiratory effort  CV: Normal rate, regular rhythm, no murmurs, no peripheral edema  GI: Soft, nontender, nondistended, no masses, no hepatomegaly, no splenomegaly  MS: Normal gait and station. Digits without clubbing or cyanosis. Skin: No rashes, ecchymoses, or petechiae. Normal temperature, turgor, and texture. Neuro/Psych: Alert, oriented. 5/5 strength in all 4 extremities. Appropriate affect, normal judgment/insight. Results:     Lab Results   Component Value Date/Time    WBC 19.6 (H) 08/28/2020 09:13 AM    HGB 13.3 08/28/2020 09:13 AM    HCT 40.2 08/28/2020 09:13 AM    PLATELET 442 06/96/3214 09:13 AM    MCV 93.5 08/28/2020 09:13 AM    ABS.  NEUTROPHILS 14.5 (H) 08/28/2020 09:13 AM     Lab Results   Component Value Date/Time    Sodium 137 08/20/2020 02:56 PM    Potassium 4.1 08/20/2020 02:56 PM    Chloride 102 08/20/2020 02:56 PM    CO2 29 08/20/2020 02:56 PM    Glucose 108 (H) 08/20/2020 02:56 PM    BUN 15 08/20/2020 02:56 PM    Creatinine 0.62 08/20/2020 02:56 PM    GFR est AA >60 08/20/2020 02:56 PM    GFR est non-AA >60 2020 02:56 PM    Calcium 10.0 2020 02:56 PM     Lab Results   Component Value Date/Time    Bilirubin, total 0.3 2020 02:56 PM    ALT (SGPT) 29 2020 02:56 PM    Alk. phosphatase 61 2020 02:56 PM    Protein, total 7.1 2020 02:56 PM    Albumin 4.2 2020 02:56 PM    Globulin 2.9 2020 02:56 PM     No results found for: IRON, FE, TIBC, IBCT, PSAT, FERR    No results found for: B12LT, FOL, RBCF  No results found for: TSH, TSH2, TSH3, TSHP, TSHEXT, TSHEXT  No results found for: HAMAT, HAAB, HABT, HAAT, HBSAG, HBSB, HBSAT, HBABN, HBCM, HBCAB, HBCAT, XBCABS, [address] [facility], HBEAG, XHEPCS, [id number], [facility], [facility], HBCLT, [address], [id number], [id number], [facility], [id number], HBCMLT, [id number], HCGAT     20: POSITIVE for the BCR-ABL1:  e13a2 (b2a2, p210) 30.77; e14a2 (b3a2, p210) 56.03; e1a2 (p190) 0.03 fusion transcripts. Imagin/28/20 abdomen ultrasound:  IMPRESSION: Hepatic steatosis. No evidence for hepatosplenomegaly    Assessment & Plan:   Tin Walker is a 68 y.o. female is seen for evaluation of leukocytosis. 1. CML: Sokal score intermediate. Discussed with patient that this a slow growing leukemia and can be effectively treated with an oral medication in the tyrosine kinase inhibitor class of meds such as Imatinib, Dasatinib, etc. Side effects of these medications include nausea, diarrhea, constipation, rash, liver test abnormalities, most of which are generally troublesome in the first 1-2 months, but then can resolve or improve over time. Cardiovascular problems can occur after months or years of treatment. Previously printed patient information on CML for patient. -- CBC, CMP, BNP today  -- Hold Dasatinib (Dasatinib 100mg PO daily. Can consider dose escalation to 140mg/d if not achieving heme or cytogenetic response - sent to [facility] [address]. )     -- This medication should be taken with food. Avoid Tylenol, [place]'s wort and grapefruit juice.   -- Return in 4 weeks for labs and MD follow up  -- At the next visit, will order CBC, CMP, Bcr/abl PCR for 6 weeks later. 2. SOB / Pleural effusions / Pulm edema / LE edema:  Dasatinib can cause peripheral edema in up to 10% of cases, but need to rule out ischemia, CHF as well. As vital signs including O2 sats are normal/acceptable, no need to sent to ER today. -- EKG, echo, referral to Cardiology. Checking BNP today. -- BLE dopplers  -- Start Lasix 20mg PO daily, check daily weight and d/c Lasix if > 5 lbs weight loss in 1 week period. 2. Fatigue: Likely due to CML and hopeful to improve. 3. HTN: Usually well controlled on current regimen. 4. Health maintenance: Last colonoscopy done 2016. Last mammogram 5/21/19.     5. Erythematous rash: No pruritus. Likely 2/2 Dasatinib. Holding medicine for now. I appreciate the opportunity to participate in Ms. Madai Stevens [facility].     CC: Dr. Kimmy Fernandez    Signed By: Delmer Wright MD     October 19, 2020

## 2020-10-20 DIAGNOSIS — R60.0 BILATERAL LOWER EXTREMITY EDEMA: Primary | ICD-10-CM

## 2020-10-20 LAB
ALBUMIN SERPL-MCNC: 4 G/DL (ref 3.5–5)
ALBUMIN/GLOB SERPL: 1.4 {RATIO} (ref 1.1–2.2)
ALP SERPL-CCNC: 74 U/L (ref 45–117)
ALT SERPL-CCNC: 97 U/L (ref 12–78)
ANION GAP SERPL CALC-SCNC: 8 MMOL/L (ref 5–15)
AST SERPL-CCNC: 45 U/L (ref 15–37)
ATRIAL RATE: 67 BPM
BASOPHILS # BLD: 0.1 K/UL (ref 0–0.1)
BASOPHILS NFR BLD: 1 % (ref 0–1)
BILIRUB SERPL-MCNC: 0.4 MG/DL (ref 0.2–1)
BNP SERPL-MCNC: 401 PG/ML
BUN SERPL-MCNC: 17 MG/DL (ref 6–20)
BUN/CREAT SERPL: 24 (ref 12–20)
CALCIUM SERPL-MCNC: 9.9 MG/DL (ref 8.5–10.1)
CALCULATED P AXIS, ECG09: 79 DEGREES
CALCULATED R AXIS, ECG10: 1 DEGREES
CALCULATED T AXIS, ECG11: 35 DEGREES
CHLORIDE SERPL-SCNC: 107 MMOL/L (ref 97–108)
CO2 SERPL-SCNC: 27 MMOL/L (ref 21–32)
CREAT SERPL-MCNC: 0.72 MG/DL (ref 0.55–1.02)
DIAGNOSIS, 93000: NORMAL
DIFFERENTIAL METHOD BLD: ABNORMAL
EOSINOPHIL # BLD: 0.2 K/UL (ref 0–0.4)
EOSINOPHIL NFR BLD: 2 % (ref 0–7)
ERYTHROCYTE [DISTWIDTH] IN BLOOD BY AUTOMATED COUNT: 15.9 % (ref 11.5–14.5)
GLOBULIN SER CALC-MCNC: 2.9 G/DL (ref 2–4)
GLUCOSE SERPL-MCNC: 99 MG/DL (ref 65–100)
HCT VFR BLD AUTO: 32.4 % (ref 35–47)
HGB BLD-MCNC: 10.4 G/DL (ref 11.5–16)
IMM GRANULOCYTES # BLD AUTO: 0 K/UL (ref 0–0.04)
IMM GRANULOCYTES NFR BLD AUTO: 0 % (ref 0–0.5)
LYMPHOCYTES # BLD: 1.3 K/UL (ref 0.8–3.5)
LYMPHOCYTES NFR BLD: 18 % (ref 12–49)
MCH RBC QN AUTO: 31.2 PG (ref 26–34)
MCHC RBC AUTO-ENTMCNC: 32.1 G/DL (ref 30–36.5)
MCV RBC AUTO: 97.3 FL (ref 80–99)
MONOCYTES # BLD: 0.4 K/UL (ref 0–1)
MONOCYTES NFR BLD: 6 % (ref 5–13)
NEUTS SEG # BLD: 5.4 K/UL (ref 1.8–8)
NEUTS SEG NFR BLD: 73 % (ref 32–75)
NRBC # BLD: 0 K/UL (ref 0–0.01)
NRBC BLD-RTO: 0 PER 100 WBC
P-R INTERVAL, ECG05: 158 MS
PLATELET # BLD AUTO: 159 K/UL (ref 150–400)
PMV BLD AUTO: 10.2 FL (ref 8.9–12.9)
POTASSIUM SERPL-SCNC: 4.5 MMOL/L (ref 3.5–5.1)
PROT SERPL-MCNC: 6.9 G/DL (ref 6.4–8.2)
Q-T INTERVAL, ECG07: 432 MS
QRS DURATION, ECG06: 86 MS
QTC CALCULATION (BEZET), ECG08: 456 MS
RBC # BLD AUTO: 3.33 M/UL (ref 3.8–5.2)
SODIUM SERPL-SCNC: 142 MMOL/L (ref 136–145)
VENTRICULAR RATE, ECG03: 67 BPM
WBC # BLD AUTO: 7.4 K/UL (ref 3.6–11)

## 2020-10-23 ENCOUNTER — HOSPITAL ENCOUNTER (OUTPATIENT)
Dept: NON INVASIVE DIAGNOSTICS | Age: 80
Discharge: HOME | End: 2020-10-23
Attending: INTERNAL MEDICINE
Payer: MEDICARE

## 2020-10-23 ENCOUNTER — HOSPITAL ENCOUNTER (OUTPATIENT)
Dept: VASCULAR SURGERY | Age: 80
Discharge: HOME | End: 2020-10-23
Attending: INTERNAL MEDICINE
Payer: MEDICARE

## 2020-10-23 ENCOUNTER — TELEPHONE (OUTPATIENT)
Dept: ONCOLOGY | Age: 80
End: 2020-10-23

## 2020-10-23 VITALS
DIASTOLIC BLOOD PRESSURE: 74 MMHG | SYSTOLIC BLOOD PRESSURE: 178 MMHG | BODY MASS INDEX: 27.72 KG/M2 | WEIGHT: 146.83 LBS | HEIGHT: 61 IN

## 2020-10-23 DIAGNOSIS — R60.0 BILATERAL LOWER EXTREMITY EDEMA: ICD-10-CM

## 2020-10-23 DIAGNOSIS — C92.10 CML (CHRONIC MYELOCYTIC LEUKEMIA) (HCC): ICD-10-CM

## 2020-10-23 DIAGNOSIS — J81.0 ACUTE PULMONARY EDEMA (HCC): ICD-10-CM

## 2020-10-23 LAB
ECHO AO ROOT DIAM: 2.83 CM
ECHO AO ST JNCT DIAM: 2.88 CM
ECHO IVC PROX: 2.16 CM
ECHO LA AREA 4C: 20.28 CM2
ECHO LA MAJOR AXIS: 4.13 CM
ECHO LA MINOR AXIS: 2.49 CM
ECHO LA VOL 2C: 55.79 ML (ref 22–52)
ECHO LA VOL 4C: 58.68 ML (ref 22–52)
ECHO LA VOL BP: 67.36 ML (ref 22–52)
ECHO LA VOL/BSA BIPLANE: 40.66 ML/M2 (ref 16–28)
ECHO LA VOLUME INDEX A2C: 33.68 ML/M2 (ref 16–28)
ECHO LA VOLUME INDEX A4C: 35.42 ML/M2 (ref 16–28)
ECHO LV E' LATERAL VELOCITY: 7.55 CENTIMETER/SECOND
ECHO LV E' SEPTAL VELOCITY: 5.52 CENTIMETER/SECOND
ECHO LV INTERNAL DIMENSION DIASTOLIC: 4.52 CM (ref 3.9–5.3)
ECHO LV INTERNAL DIMENSION SYSTOLIC: 2.83 CM
ECHO LV IVSD: 0.88 CM (ref 0.6–0.9)
ECHO LV MASS 2D: 121.1 G (ref 67–162)
ECHO LV MASS INDEX 2D: 73.1 G/M2 (ref 43–95)
ECHO LV POSTERIOR WALL DIASTOLIC: 0.79 CM (ref 0.6–0.9)
ECHO LVOT DIAM: 1.8 CM
ECHO MV A VELOCITY: 96.72 CENTIMETER/SECOND
ECHO MV AREA PHT: 3.67 CM2
ECHO MV E DECELERATION TIME (DT): 206.78 MS
ECHO MV E VELOCITY: 86.7 CENTIMETER/SECOND
ECHO MV PRESSURE HALF TIME (PHT): 59.97 MS
ECHO PV MAX VELOCITY: 93.69 CM/S
ECHO PV PEAK INSTANTANEOUS GRADIENT SYSTOLIC: 3.51 MMHG
ECHO RV INTERNAL DIMENSION: 3.09 CM
ECHO RV TAPSE: 2.48 CM (ref 1.5–2)
ECHO TV REGURGITANT MAX VELOCITY: 279.37 CM/S
ECHO TV REGURGITANT PEAK GRADIENT: 31.22 MMHG
LA VOL DISK BP: 62.73 ML (ref 22–52)

## 2020-10-23 PROCEDURE — 93970 EXTREMITY STUDY: CPT

## 2020-10-23 PROCEDURE — 93306 TTE W/DOPPLER COMPLETE: CPT

## 2020-10-23 NOTE — PROGRESS NOTES
Your echo is actually normal, which is great. Has your weight and swelling improved with the Lasix? Are you still taking this every day?

## 2020-10-23 NOTE — TELEPHONE ENCOUNTER
[address] at [place]  [phone]        10/23/20 3:04 PM Scheduled patient to see Dr. Marta Grady on Monday, 11/09/20, at 1:40 PM. Will notify patient on Monday as she is currently at hospital for ECHO and dopplers per chart review. 4:30 PM Called patient and notified of above appointment. Also clarified office location. No further questions or concerns at this time.

## 2020-10-23 NOTE — PROGRESS NOTES
Called patient and advised of ECHO results per Dr. Marta Viveros. Patient states her weight is down 3lbs but lower extremity swelling appears unchanged. Patient taking furosemide 20 mg daily. Per Dr. Marta Viveros, inquired if patient would be willing to restart trial of Dasatinib since her swelling has remain unchanged after being off of medication x 1 week. Patient declines stating she did not like the way medication made her feel. Patient states she felt like she was being \"poisoned. \" Patient unable to elaborate further--denies nausea/vomitting and fatigue when taking Dasatinib. Will update provider.

## 2020-10-28 NOTE — PROGRESS NOTES
[id number] [facility] Oncology at [address] [place] [address]  [phone]    Hematology / Oncology Established Visit    Reason for Visit:   Ben Villalobos is a 68 y.o. female who is seen by synchronous (real-time) audio-video technology on 9/10/2020 for follow up of CML. PCP is Dr. Reyna Burk.     Hematology Oncology Treatment History:     Diagnosis: Chronic myelogenous leukemia (CML)    Stage: Pending    Pathology:   - 8/1/20 Peripheral blood BCR-ABL1 FISH: 84% of nuclei positive for BCR/ABL1 fusion    -8/28/20 Bone marrow biopsy: Chronic myeloid leukemia, BCR-ABL1-positive, chronic phase   The bone marrow biopsy is 90% cellular and adequate for evaluation. The myeloid series appears hyperplastic and left-shifted with increased proportion of promyelocytes and myelocytes. Blasts do not appear increased. The erythroid series is reduced in number but progresses through the full maturation sequence without significant dysplasia. Megakaryocytes are increased in number with dwarf forms seen. Plasma cells and lymphocytes do not appear increased in number.   -Cytogenetics: Karyotype: 46,XX,t(9;22)(q34.1;q11.2)[20]; Interpretation: ABNORMAL FEMALE KARYOTYPE   The t(9;22)(q34.1;q11.2), resulting in formation of the [place] chromosome, is observed in 90-95% of cases with chronic myeloid leukemia, BCR-ABL1 positive. It is also observed in acute lymphoblastic leukemia/lymphoma and mixed phenotype acute leukemia (MPAL) with t(9;22)(q34.1;q11.2);BCR-ABL1 and in 1-2% of AML. These results should be nterpreted in the context of clinical and histopathologic findings. .    Prior Treatment: None    Current Treatment: Dasatinib 100mg PO daily, started 9/29/2020. History of Present Illness:   Ben Villalobos is a 68 y.o. female with HTN, Hyperlipidemia, allergic rhinitis, GERD comes in for follow up of leukocytosis. Per PCP notes, pt has had fatigue for past 4 months.  Recently was treated with UTI in late June 2020 treated with Amoxicillin. She also developed a rash in late June, which was treated steroid ointment, but no oral Prednisone. Dermatologist felt this was a heat rash. She does endorse yeast type rash under breast or in groin which comes and goes, not new. No unintentional weight loss, good appetite. No fevers, chills, night sweats. Had a recent chest CT which was negative for PE. Back in Feb 2020, she felt severely exhausted for 1 week, and the recovered. She wondered if she had COVID19 virus. Outside labs:   1/21/20: WBC 10.6, Hgb 13.8, MCV 92,   7/20/20: WBC 20.5, Hgb 13, MCV 91,   7/27/20: WBC 17.3, Hgb 12.4, MCV 95,     Interval History:  Patient is seen for follow up up of CML. After being on Dasatinib for 2 weeks, she states felt terrible - was angry at everyone, felt crazy, felt like having a persistent hot flash. When asked to elaborate on feeling crazy, she denies hallucinations or confusing, but does endorse mood problems while on the medication. She also had weight gain and leg swelling. She stopped Dasatinib at the last visit, started on Lasix daily. Gradually over the past 2 weeks, the leg swelling improved and she feels improved. Cardiac workup with EKG, echo negative. CXR did show pleural effusions and dopplers negative for DVT. Past Medical History:   Diagnosis Date    Hypertension       Past Surgical History:   Procedure Laterality Date    HX HEENT      Appendectomy    HX ORTHOPAEDIC      Bilateral hip replacement   Had total hysterectomy for uterine prolapse in 2006 with pathology showing squamous metaplasia of cervix. Social History     Tobacco Use    Smoking status: Former Smoker    Smokeless tobacco: Never Used   Substance Use Topics    Alcohol use: Not Currently      No family history on file. Current Outpatient Medications   Medication Sig    furosemide (LASIX) 20 mg tablet Take 1 Tab by mouth daily.     dasatinib (SPRYCEL) 100 mg tab Take 100 mg by mouth daily (with breakfast).  calcium citrate-vitamin d3 (CITRACAL+D) 315 mg-5 mcg (200 unit) tab Take 1 Tab by mouth daily (with breakfast).  cyanocobalamin 1,000 mcg tablet Take 5,000 mcg by mouth daily.  cranberry fruit concentrate (AZO CRANBERRY PO) Take  by mouth.  lisinopril-hydroCHLOROthiazide (PRINZIDE, ZESTORETIC) 20-12.5 mg per tablet TAKE 1 TABLET EVERY MORNING (NEED LAB WORK IN NEXT 3 MONTHS, PLEASE SCHEDULE)     No current facility-administered medications for this visit. Allergies   Allergen Reactions    Macrobid [Nitrofurantoin Monohyd/M-Cryst] Other (comments)     Red rash on arms and legs        Review of Systems: A complete review of systems was obtained, negative except as described above. Physical Exam:     Visit Vitals  BP (!) 180/72 (BP 1 Location: Left arm, BP Patient Position: Sitting)   Pulse 71   Temp 97.3 °F (36.3 °C) (Temporal)   Resp 16   Ht 5' 1\" (1.549 m)   Wt 137 lb (62.1 kg)   SpO2 96%   BMI 25.89 kg/m²     ECOG PS: 1  General: Well developed, no acute distress  Eyes: PERRLA, EOMI, anicteric sclerae  HENT: Atraumatic, OP clear, TMs intact without erythema  Neck: Supple  Lymphatic: No cervical, supraclavicular, axillary or inguinal adenopathy  Respiratory: CTAB, normal respiratory effort  CV: Normal rate, regular rhythm, no murmurs, no peripheral edema  GI: Soft, nontender, nondistended, no masses, no hepatomegaly, no splenomegaly  MS: Normal gait and station. Digits without clubbing or cyanosis. Skin: No rashes, ecchymoses, or petechiae. Normal temperature, turgor, and texture. Neuro/Psych: Alert, oriented. 5/5 strength in all 4 extremities. Appropriate affect, normal judgment/insight. Results:     Lab Results   Component Value Date/Time    WBC 7.4 10/19/2020 03:47 PM    HGB 10.4 (L) 10/19/2020 03:47 PM    HCT 32.4 (L) 10/19/2020 03:47 PM    PLATELET 009 07/02/3981 03:47 PM    MCV 97.3 10/19/2020 03:47 PM    ABS.  NEUTROPHILS 5.4 10/19/2020 03:47 PM     Lab Results   Component Value Date/Time    Sodium 142 10/19/2020 03:47 PM    Potassium 4.5 10/19/2020 03:47 PM    Chloride 107 10/19/2020 03:47 PM    CO2 27 10/19/2020 03:47 PM    Glucose 99 10/19/2020 03:47 PM    BUN 17 10/19/2020 03:47 PM    Creatinine 0.72 10/19/2020 03:47 PM    GFR est AA >60 10/19/2020 03:47 PM    GFR est non-AA >60 10/19/2020 03:47 PM    Calcium 9.9 10/19/2020 03:47 PM     Lab Results   Component Value Date/Time    Bilirubin, total 0.4 10/19/2020 03:47 PM    ALT (SGPT) 97 (H) 10/19/2020 03:47 PM    Alk. phosphatase 74 10/19/2020 03:47 PM    Protein, total 6.9 10/19/2020 03:47 PM    Albumin 4.0 10/19/2020 03:47 PM    Globulin 2.9 10/19/2020 03:47 PM     No results found for: IRON, FE, TIBC, IBCT, PSAT, FERR    No results found for: B12LT, FOL, RBCF  No results found for: TSH, TSH2, TSH3, TSHP, TSHEXT, TSHEXT  No results found for: HAMAT, HAAB, HABT, HAAT, HBSAG, HBSB, HBSAT, HBABN, HBCM, HBCAB, HBCAT, XBCABS, [address], HBEAG, XHEPCS, [id number], [address] [facility], HBCLT, [address], [id number], [id number], [address] [facility], [id number], HBCMLT, [id number], HCGAT     20: POSITIVE for the BCR-ABL1:  e13a2 (b2a2, p210) 30.77; e14a2 (b3a2, p210) 56.03; e1a2 (p190) 0.03 fusion transcripts. Imagin/28/20 abdomen ultrasound:  IMPRESSION: Hepatic steatosis. No evidence for hepatosplenomegaly    Assessment & Plan:   Doug Robles is a 68 y.o. female is seen for evaluation of leukocytosis. 1. CML: Sokal score intermediate. Discussed with patient that this a slow growing leukemia and can be effectively treated with an oral medication in the tyrosine kinase inhibitor class of meds such as Imatinib, Dasatinib, etc. Side effects of these medications include nausea, diarrhea, constipation, rash, liver test abnormalities, most of which are generally troublesome in the first 1-2 months, but then can resolve or improve over time. Cardiovascular problems can occur after months or years of treatment.  Previously printed patient information on CML for patient. Patient developed side effects of fluid overload and mood changes after 2 weeks of Dasatinib. She does not want to restart this medication. She also does not want to switch to alternate TKI such as Nilotinib at this time. -- Will monitor off of treatment for now with monthly CBC  -- Will also check CMP and bcr/abl in 3 months with MD follow up 2 weeks after (will see her again in mid Feb 2021.)    2. SOB / Pleural effusions / Pulm edema / LE edema:  Dasatinib can cause peripheral edema in up to 10% of cases. LE edema and weight both improved with 2 weeks of Lasix 20mg/d, which is now complete. Cardiac workup was negative for ischemia. Echo showed EF 60-65%. I wonder if patient has diastolic dysfunction. -- Seeing Dr. Rakesh Aguirre next week    3. Fatigue: Likely due to CML and did improve somewhat - although pt is unsure if this was while on Dasatinib or after she discontinued. 4. HTN: Uncontrolled today, but denies CP, SOB, HA. She states it is usually better controlled. 5. Health maintenance: Last colonoscopy done 2016. Last mammogram 5/21/19.     6. H/o Erythematous rash: No pruritus. Was likely 2/2 Dasatinib and now improved off of treatment. I appreciate the opportunity to participate in Ms. Filippo oconnor.     CC: Dr. Reyna Burk    Signed By: Xochitl Wilks MD     November 3, 2020

## 2020-11-02 DIAGNOSIS — C92.10 CML (CHRONIC MYELOCYTIC LEUKEMIA) (HCC): ICD-10-CM

## 2020-11-02 DIAGNOSIS — R60.0 BILATERAL LOWER EXTREMITY EDEMA: ICD-10-CM

## 2020-11-02 DIAGNOSIS — J81.0 ACUTE PULMONARY EDEMA (HCC): ICD-10-CM

## 2020-11-02 LAB
ALBUMIN SERPL-MCNC: 4.3 G/DL (ref 3.5–5)
ALBUMIN/GLOB SERPL: 1.4 {RATIO} (ref 1.1–2.2)
ALP SERPL-CCNC: 82 U/L (ref 45–117)
ALT SERPL-CCNC: 32 U/L (ref 12–78)
ANION GAP SERPL CALC-SCNC: 5 MMOL/L (ref 5–15)
AST SERPL-CCNC: 20 U/L (ref 15–37)
BASOPHILS # BLD: 0.1 K/UL (ref 0–0.1)
BASOPHILS NFR BLD: 2 % (ref 0–1)
BILIRUB SERPL-MCNC: 0.2 MG/DL (ref 0.2–1)
BUN SERPL-MCNC: 17 MG/DL (ref 6–20)
BUN/CREAT SERPL: 27 (ref 12–20)
CALCIUM SERPL-MCNC: 9.7 MG/DL (ref 8.5–10.1)
CHLORIDE SERPL-SCNC: 108 MMOL/L (ref 97–108)
CO2 SERPL-SCNC: 29 MMOL/L (ref 21–32)
CREAT SERPL-MCNC: 0.62 MG/DL (ref 0.55–1.02)
DIFFERENTIAL METHOD BLD: ABNORMAL
EOSINOPHIL # BLD: 0.1 K/UL (ref 0–0.4)
EOSINOPHIL NFR BLD: 2 % (ref 0–7)
ERYTHROCYTE [DISTWIDTH] IN BLOOD BY AUTOMATED COUNT: 15.4 % (ref 11.5–14.5)
GLOBULIN SER CALC-MCNC: 3.1 G/DL (ref 2–4)
GLUCOSE SERPL-MCNC: 98 MG/DL (ref 65–100)
HCT VFR BLD AUTO: 36.2 % (ref 35–47)
HGB BLD-MCNC: 11.3 G/DL (ref 11.5–16)
IMM GRANULOCYTES # BLD AUTO: 0 K/UL (ref 0–0.04)
IMM GRANULOCYTES NFR BLD AUTO: 0 % (ref 0–0.5)
LYMPHOCYTES # BLD: 1.6 K/UL (ref 0.8–3.5)
LYMPHOCYTES NFR BLD: 28 % (ref 12–49)
MCH RBC QN AUTO: 30.6 PG (ref 26–34)
MCHC RBC AUTO-ENTMCNC: 31.2 G/DL (ref 30–36.5)
MCV RBC AUTO: 98.1 FL (ref 80–99)
MONOCYTES # BLD: 0.6 K/UL (ref 0–1)
MONOCYTES NFR BLD: 11 % (ref 5–13)
NEUTS SEG # BLD: 3.5 K/UL (ref 1.8–8)
NEUTS SEG NFR BLD: 57 % (ref 32–75)
NRBC # BLD: 0 K/UL (ref 0–0.01)
NRBC BLD-RTO: 0 PER 100 WBC
PLATELET # BLD AUTO: 304 K/UL (ref 150–400)
PMV BLD AUTO: 10.9 FL (ref 8.9–12.9)
POTASSIUM SERPL-SCNC: 4.7 MMOL/L (ref 3.5–5.1)
PROT SERPL-MCNC: 7.4 G/DL (ref 6.4–8.2)
RBC # BLD AUTO: 3.69 M/UL (ref 3.8–5.2)
SODIUM SERPL-SCNC: 142 MMOL/L (ref 136–145)
WBC # BLD AUTO: 5.9 K/UL (ref 3.6–11)

## 2020-11-03 ENCOUNTER — TELEPHONE (OUTPATIENT)
Dept: FAMILY MEDICINE CLINIC | Age: 80
End: 2020-11-03

## 2020-11-03 ENCOUNTER — OFFICE VISIT (OUTPATIENT)
Dept: ONCOLOGY | Age: 80
End: 2020-11-03
Payer: MEDICARE

## 2020-11-03 VITALS
BODY MASS INDEX: 25.86 KG/M2 | HEART RATE: 71 BPM | DIASTOLIC BLOOD PRESSURE: 72 MMHG | TEMPERATURE: 97.3 F | SYSTOLIC BLOOD PRESSURE: 180 MMHG | OXYGEN SATURATION: 96 % | WEIGHT: 137 LBS | HEIGHT: 61 IN | RESPIRATION RATE: 16 BRPM

## 2020-11-03 DIAGNOSIS — R60.0 BILATERAL LOWER EXTREMITY EDEMA: ICD-10-CM

## 2020-11-03 DIAGNOSIS — I10 ESSENTIAL HYPERTENSION: ICD-10-CM

## 2020-11-03 DIAGNOSIS — L27.0 DRUG RASH: ICD-10-CM

## 2020-11-03 DIAGNOSIS — R53.83 FATIGUE, UNSPECIFIED TYPE: ICD-10-CM

## 2020-11-03 DIAGNOSIS — C92.10 CML (CHRONIC MYELOCYTIC LEUKEMIA) (HCC): Primary | ICD-10-CM

## 2020-11-03 PROCEDURE — G8400 PT W/DXA NO RESULTS DOC: HCPCS | Performed by: INTERNAL MEDICINE

## 2020-11-03 PROCEDURE — G8419 CALC BMI OUT NRM PARAM NOF/U: HCPCS | Performed by: INTERNAL MEDICINE

## 2020-11-03 PROCEDURE — G8536 NO DOC ELDER MAL SCRN: HCPCS | Performed by: INTERNAL MEDICINE

## 2020-11-03 PROCEDURE — G8427 DOCREV CUR MEDS BY ELIG CLIN: HCPCS | Performed by: INTERNAL MEDICINE

## 2020-11-03 PROCEDURE — G8510 SCR DEP NEG, NO PLAN REQD: HCPCS | Performed by: INTERNAL MEDICINE

## 2020-11-03 PROCEDURE — 1101F PT FALLS ASSESS-DOCD LE1/YR: CPT | Performed by: INTERNAL MEDICINE

## 2020-11-03 PROCEDURE — 99214 OFFICE O/P EST MOD 30 MIN: CPT | Performed by: INTERNAL MEDICINE

## 2020-11-03 PROCEDURE — 1090F PRES/ABSN URINE INCON ASSESS: CPT | Performed by: INTERNAL MEDICINE

## 2020-11-03 NOTE — PROGRESS NOTES
Chief Complaint   Patient presents with    Follow-up     Mikael Vigil is a pleasant 68year old woman who presents as a follow up for CML. She denies any pain or discomfort at this time.

## 2020-11-16 ENCOUNTER — OFFICE VISIT (OUTPATIENT)
Dept: CARDIOLOGY CLINIC | Age: 80
End: 2020-11-16
Payer: MEDICARE

## 2020-11-16 VITALS
HEIGHT: 61 IN | OXYGEN SATURATION: 99 % | DIASTOLIC BLOOD PRESSURE: 90 MMHG | WEIGHT: 137 LBS | BODY MASS INDEX: 25.86 KG/M2 | HEART RATE: 67 BPM | RESPIRATION RATE: 18 BRPM | SYSTOLIC BLOOD PRESSURE: 180 MMHG

## 2020-11-16 DIAGNOSIS — C92.10 CML (CHRONIC MYELOCYTIC LEUKEMIA) (HCC): Primary | ICD-10-CM

## 2020-11-16 DIAGNOSIS — J81.0 ACUTE PULMONARY EDEMA (HCC): ICD-10-CM

## 2020-11-16 PROCEDURE — 99204 OFFICE O/P NEW MOD 45 MIN: CPT | Performed by: INTERNAL MEDICINE

## 2020-11-16 RX ORDER — LISINOPRIL AND HYDROCHLOROTHIAZIDE 12.5; 2 MG/1; MG/1
2 TABLET ORAL DAILY
Qty: 180 TAB | Refills: 0 | Status: SHIPPED | OUTPATIENT
Start: 2020-11-16 | End: 2021-04-22

## 2020-11-16 NOTE — PROGRESS NOTES
Navid Carson is a 68 y.o. female    Chief Complaint   Patient presents with    New Patient     1. Have you been to the ER, urgent care clinic since your last visit? Hospitalized since your last visit? NO      2. Have you seen or consulted any other health care providers outside of the [address] [place] [address] since your last visit? Include any pap smears or colon screening.  NO

## 2020-11-16 NOTE — PROGRESS NOTES
Reason for Consult: Pulmonary Edema with SOB      HPI: Gayatri Gonzales is a 68 y.o. female with past medical history significant for hypertension, chronic myeloid leukemia who is being referred for evaluation for ischemic heart disease. She recently underwent treatment with dasatinib and after 2 weeks of treatment she went into pulmonary edema with shortness of breath. The dasatinib was discontinued and she was given Lasix for a few days and she felt better with diuresis. She has stopped taking the Lasix after she achieved. Usual body weight. Since then she has not had any recurrence of shortness of breath. There is no symptoms of chest pain, palpitations, lightheadedness, dizziness, presyncope or syncope. No previous cardiac history. No significant previous cardiac testing. No history of tobacco smoking. EKG from October 16, 2020 was personally reviewed. EKG demonstrated normal sinus rhythm, normal axis, nonspecific T wave changes. Plan:    1. Pulmonary edema secondary to dasatinib: Likely related to the side effect of dasatinib. This is now on hold. Need to rule out ischemic heart disease as that is also one of the side effect of this medication. We will proceed with Lexiscan stress nuclear study. Use Lasix as needed. 2.  Hypertension, blood pressure is high and may have contributed to the pulmonary edema. Will increase the dose of lisinopril HCTZ to 40/25 daily. 3. Phone followup of the test results. ATTENTION:   This medical record was transcribed using an electronic medical records/speech recognition system. Although proofread, it may and can contain electronic, spelling and other errors. Corrections may be executed at a later time. Please feel free to contact us for any clarifications as needed.       Past Medical History:   Diagnosis Date    Hypertension           Past Surgical History:   Procedure Laterality Date    HX HEENT      Appendectomy    HX ORTHOPAEDIC Bilateral hip replacement             History reviewed. No pertinent family history. Social History     Socioeconomic History    Marital status:      Spouse name: Not on file    Number of children: Not on file    Years of education: Not on file    Highest education level: Not on file   Occupational History    Not on file   Social Needs    Financial resource strain: Not on file    Food insecurity     Worry: Not on file     Inability: Not on file    Transportation needs     Medical: Not on file     Non-medical: Not on file   Tobacco Use    Smoking status: Former Smoker    Smokeless tobacco: Never Used   Substance and Sexual Activity    Alcohol use: Not Currently    Drug use: Never    Sexual activity: Not Currently   Lifestyle    Physical activity     Days per week: Not on file     Minutes per session: Not on file    Stress: Not on file   Relationships    Social connections     Talks on phone: Not on file     Gets together: Not on file     Attends Yazdanism service: Not on file     Active member of club or organization: Not on file     Attends meetings of clubs or organizations: Not on file     Relationship status: Not on file    Intimate partner violence     Fear of current or ex partner: Not on file     Emotionally abused: Not on file     Physically abused: Not on file     Forced sexual activity: Not on file   Other Topics Concern    Not on file   Social History Narrative    Not on file         Allergies   Allergen Reactions    Macrobid [Nitrofurantoin Monohyd/M-Cryst] Other (comments)     Red rash on arms and legs            Current Outpatient Medications   Medication Sig Dispense Refill    cranberry fruit concentrate (AZO CRANBERRY PO) Take  by mouth.  lisinopril-hydroCHLOROthiazide (PRINZIDE, ZESTORETIC) 20-12.5 mg per tablet TAKE 1 TABLET EVERY MORNING (NEED LAB WORK IN NEXT 3 MONTHS, PLEASE SCHEDULE) 90 Tab 0    furosemide (LASIX) 20 mg tablet Take 1 Tab by mouth daily. 30 Tab 0    dasatinib (SPRYCEL) 100 mg tab Take 100 mg by mouth daily (with breakfast). 30 Tab 3    calcium citrate-vitamin d3 (CITRACAL+D) 315 mg-5 mcg (200 unit) tab Take 1 Tab by mouth daily (with breakfast).  cyanocobalamin 1,000 mcg tablet Take 5,000 mcg by mouth daily. ROS:  12 point review of systems was performed. All negative except for HPI     Physical Exam:  Visit Vitals  BP (!) 180/90 (BP 1 Location: Right arm, BP Patient Position: Sitting)   Pulse 67   Resp 18   Ht 5' 1\" (1.549 m)   Wt 137 lb (62.1 kg)   SpO2 99%   BMI 25.89 kg/m²       Gen:  Well-developed, well-nourished, in no acute distress  HEENT:  Pink conjunctivae, PERRL, hearing intact to voice, moist mucous membranes  Neck:  Supple, without masses, thyroid non-tender  Resp:  No accessory muscle use, clear breath sounds without wheezes rales or rhonchi  Card:  No murmurs, normal S1, S2 without thrills, bruits or peripheral edema  Abd:  Soft, non-tender, non-distended, normoactive bowel sounds are present, no palpable organomegaly and no detectable hernias  Lymph:  No cervical or inguinal adenopathy  Musc:  No cyanosis or clubbing  Skin:  No rashes or ulcers, skin turgor is good  Neuro:  Cranial nerves are grossly intact, no focal motor weakness, follows commands appropriately  Psych:  Good insight, oriented to person, place and time, alert     Labs:     Lab Results   Component Value Date/Time    WBC 5.9 11/02/2020 01:26 PM    HGB 11.3 (L) 11/02/2020 01:26 PM    HCT 36.2 11/02/2020 01:26 PM    PLATELET 260 58/74/2063 01:26 PM    MCV 98.1 11/02/2020 01:26 PM     Lab Results   Component Value Date/Time    Glucose 98 11/02/2020 01:26 PM    Creatinine 0.62 11/02/2020 01:26 PM      No results found for: CHOL, CHOLPOCT, HDL, LDL, LDLC, LDLCPOC, LDLCEXT, TRIGL, TGLPOCT, CHHD, CHHDX  Lab Results   Component Value Date/Time    ALT (SGPT) 32 11/02/2020 01:26 PM    Alk.  phosphatase 82 11/02/2020 01:26 PM    Bilirubin, total 0.2 11/02/2020 01:26 PM    Albumin 4.3 11/02/2020 01:26 PM    Protein, total 7.4 11/02/2020 01:26 PM    PLATELET 027 34/72/2156 01:26 PM     No results found for: INR, INREXT, PTMR, PTP, PT1, PT2, INREXT   Lab Results   Component Value Date/Time    GFR est non-AA >60 11/02/2020 01:26 PM    GFR est AA >60 11/02/2020 01:26 PM    Creatinine 0.62 11/02/2020 01:26 PM    BUN 17 11/02/2020 01:26 PM    Sodium 142 11/02/2020 01:26 PM    Potassium 4.7 11/02/2020 01:26 PM    Chloride 108 11/02/2020 01:26 PM    CO2 29 11/02/2020 01:26 PM     No results found for: PSA, PSA2, PSAR1, PSA1, PSAR2, PSA3, PSAR3, [id number], [id number], PSALT  No results found for: TSH, TSH2, TSH3, TSHP, TSHELE, TSHEXT, TT3, T3U, T3UP, FRT3, FT3, FT4, FT4P, T4, T4P, FT4T, TT7, TSHEXT   Lab Results   Component Value Date/Time    Glucose 98 11/02/2020 01:26 PM      No results found for: CPK, RCK1, RCK2, RCK3, RCK4, CKMB, CKNDX, CKND1, TROPT, TROIQ, BNPP, BNP   Lab Results   Component Value Date/Time    NT pro- 10/19/2020 03:47 PM      Lab Results   Component Value Date/Time    Sodium 142 11/02/2020 01:26 PM    Potassium 4.7 11/02/2020 01:26 PM    Chloride 108 11/02/2020 01:26 PM    CO2 29 11/02/2020 01:26 PM    Anion gap 5 11/02/2020 01:26 PM    Glucose 98 11/02/2020 01:26 PM    BUN 17 11/02/2020 01:26 PM    Creatinine 0.62 11/02/2020 01:26 PM    BUN/Creatinine ratio 27 (H) 11/02/2020 01:26 PM    GFR est AA >60 11/02/2020 01:26 PM    GFR est non-AA >60 11/02/2020 01:26 PM    Calcium 9.7 11/02/2020 01:26 PM      Lab Results   Component Value Date/Time    Sodium 142 11/02/2020 01:26 PM    Potassium 4.7 11/02/2020 01:26 PM    Chloride 108 11/02/2020 01:26 PM    CO2 29 11/02/2020 01:26 PM    Anion gap 5 11/02/2020 01:26 PM    Glucose 98 11/02/2020 01:26 PM    BUN 17 11/02/2020 01:26 PM    Creatinine 0.62 11/02/2020 01:26 PM    BUN/Creatinine ratio 27 (H) 11/02/2020 01:26 PM    GFR est AA >60 11/02/2020 01:26 PM    GFR est non-AA >60 11/02/2020 01:26 PM    Calcium 9.7 11/02/2020 01:26 PM    Bilirubin, total 0.2 11/02/2020 01:26 PM    ALT (SGPT) 32 11/02/2020 01:26 PM    Alk. phosphatase 82 11/02/2020 01:26 PM    Protein, total 7.4 11/02/2020 01:26 PM    Albumin 4.3 11/02/2020 01:26 PM    Globulin 3.1 11/02/2020 01:26 PM    A-G Ratio 1.4 11/02/2020 01:26 PM      No results found for: HBA1C, [id number], HGBE8, KWB5TGUM, FTO6VCUM      No results for input(s): CPK, CKMB, TROIQ in the last 72 hours. No lab exists for component: CKQMB, CPKMB        Problem List:     Problem List  Date Reviewed: 10/19/2020    None            Ari Oquendo MD, [facility]

## 2020-11-16 NOTE — PATIENT INSTRUCTIONS
Increase the dose of Lisinopril/ HCTZ to 40mg/25mg po daily. Lexiscan stress nuclear study- Pulmonary Edema. Phone followup of the test results.

## 2020-11-30 DIAGNOSIS — C92.10 CML (CHRONIC MYELOCYTIC LEUKEMIA) (HCC): ICD-10-CM

## 2020-11-30 LAB
BASOPHILS # BLD: 0.3 K/UL (ref 0–0.1)
BASOPHILS NFR BLD: 3 % (ref 0–1)
DIFFERENTIAL METHOD BLD: ABNORMAL
EOSINOPHIL # BLD: 0.2 K/UL (ref 0–0.4)
EOSINOPHIL NFR BLD: 2 % (ref 0–7)
ERYTHROCYTE [DISTWIDTH] IN BLOOD BY AUTOMATED COUNT: 14.6 % (ref 11.5–14.5)
HCT VFR BLD AUTO: 39.5 % (ref 35–47)
HGB BLD-MCNC: 12.7 G/DL (ref 11.5–16)
IMM GRANULOCYTES # BLD AUTO: 0 K/UL (ref 0–0.04)
IMM GRANULOCYTES NFR BLD AUTO: 0 % (ref 0–0.5)
LYMPHOCYTES # BLD: 2.5 K/UL (ref 0.8–3.5)
LYMPHOCYTES NFR BLD: 25 % (ref 12–49)
MCH RBC QN AUTO: 30.9 PG (ref 26–34)
MCHC RBC AUTO-ENTMCNC: 32.2 G/DL (ref 30–36.5)
MCV RBC AUTO: 96.1 FL (ref 80–99)
MONOCYTES # BLD: 0.5 K/UL (ref 0–1)
MONOCYTES NFR BLD: 5 % (ref 5–13)
NEUTS SEG # BLD: 6.3 K/UL (ref 1.8–8)
NEUTS SEG NFR BLD: 65 % (ref 32–75)
NRBC # BLD: 0 K/UL (ref 0–0.01)
NRBC BLD-RTO: 0 PER 100 WBC
PLATELET # BLD AUTO: 264 K/UL (ref 150–400)
PMV BLD AUTO: 12 FL (ref 8.9–12.9)
RBC # BLD AUTO: 4.11 M/UL (ref 3.8–5.2)
RBC MORPH BLD: ABNORMAL
WBC # BLD AUTO: 9.8 K/UL (ref 3.6–11)

## 2021-02-01 DIAGNOSIS — C92.10 CML (CHRONIC MYELOCYTIC LEUKEMIA) (HCC): ICD-10-CM

## 2021-02-03 LAB
ALBUMIN SERPL-MCNC: 4.2 G/DL (ref 3.5–5)
ALBUMIN/GLOB SERPL: 1.4 {RATIO} (ref 1.1–2.2)
ALP SERPL-CCNC: 63 U/L (ref 45–117)
ALT SERPL-CCNC: 33 U/L (ref 12–78)
ANION GAP SERPL CALC-SCNC: 6 MMOL/L (ref 5–15)
AST SERPL-CCNC: 23 U/L (ref 15–37)
BACKGROUND, BCR6T: NORMAL
BASOPHILS # BLD: 0.5 K/UL (ref 0–0.1)
BASOPHILS NFR BLD: 3 % (ref 0–1)
BILIRUB SERPL-MCNC: 0.3 MG/DL (ref 0.2–1)
BUN SERPL-MCNC: 21 MG/DL (ref 6–20)
BUN/CREAT SERPL: 33 (ref 12–20)
CALCIUM SERPL-MCNC: 10.3 MG/DL (ref 8.5–10.1)
CHLORIDE SERPL-SCNC: 103 MMOL/L (ref 97–108)
CO2 SERPL-SCNC: 28 MMOL/L (ref 21–32)
CREAT SERPL-MCNC: 0.64 MG/DL (ref 0.55–1.02)
DIFFERENTIAL METHOD BLD: ABNORMAL
EOSINOPHIL # BLD: 0.3 K/UL (ref 0–0.4)
EOSINOPHIL NFR BLD: 2 % (ref 0–7)
ERYTHROCYTE [DISTWIDTH] IN BLOOD BY AUTOMATED COUNT: 14.7 % (ref 11.5–14.5)
GLOBULIN SER CALC-MCNC: 3.1 G/DL (ref 2–4)
GLUCOSE SERPL-MCNC: 105 MG/DL (ref 65–100)
HCT VFR BLD AUTO: 38.4 % (ref 35–47)
HGB BLD-MCNC: 12.5 G/DL (ref 11.5–16)
IMM GRANULOCYTES # BLD AUTO: 0 K/UL
IMM GRANULOCYTES NFR BLD AUTO: 0 %
INTERPRETATION: 480488: POSITIVE
LAB DIRECTOR NAME PROVIDER: NORMAL
LYMPHOCYTES # BLD: 2 K/UL (ref 0.8–3.5)
LYMPHOCYTES NFR BLD: 12 % (ref 12–49)
MCH RBC QN AUTO: 30.5 PG (ref 26–34)
MCHC RBC AUTO-ENTMCNC: 32.6 G/DL (ref 30–36.5)
MCV RBC AUTO: 93.7 FL (ref 80–99)
MONOCYTES # BLD: 0.9 K/UL (ref 0–1)
MONOCYTES NFR BLD: 5 % (ref 5–13)
NEUTS BAND NFR BLD MANUAL: 1 % (ref 0–6)
NEUTS SEG # BLD: 13.3 K/UL (ref 1.8–8)
NEUTS SEG NFR BLD: 77 % (ref 32–75)
NRBC # BLD: 0 K/UL (ref 0–0.01)
NRBC BLD-RTO: 0 PER 100 WBC
PLATELET # BLD AUTO: 183 K/UL (ref 150–400)
PMV BLD AUTO: 11.6 FL (ref 8.9–12.9)
POTASSIUM SERPL-SCNC: 4.5 MMOL/L (ref 3.5–5.1)
PROT SERPL-MCNC: 7.3 G/DL (ref 6.4–8.2)
RBC # BLD AUTO: 4.1 M/UL (ref 3.8–5.2)
RBC MORPH BLD: ABNORMAL
SODIUM SERPL-SCNC: 137 MMOL/L (ref 136–145)
T(ABL1,BCR)B2A2/CONTROL BLD/T: 13.22 %
T(ABL1,BCR)B2A2/CONTROL BLD/T: NORMAL %
T(ABL1,BCR)B3A2/CONTROL BLD/T: 34.39 %
T(ABL1,BCR)E1A2/CONTROL BLD/T: 0.02 %
WBC # BLD AUTO: 17 K/UL (ref 3.6–11)

## 2021-02-17 NOTE — PROGRESS NOTES
[address] [facility] at [address] [place] [address]  [phone]    Hematology / Oncology Established Visit    Reason for Visit:   Roxana Patel is a 68 y.o. female who is seen by synchronous (real-time) audio-video technology on 02/18/21 for follow up of CML. PCP is Dr. Johnny Ibrahim.     Hematology Oncology Treatment History:     Diagnosis: Chronic myelogenous leukemia (CML)    Stage: Pending    Pathology:   - 8/1/20 Peripheral blood BCR-ABL1 FISH: 84% of nuclei positive for BCR/ABL1 fusion    -8/28/20 Bone marrow biopsy: Chronic myeloid leukemia, BCR-ABL1-positive, chronic phase   The bone marrow biopsy is 90% cellular and adequate for evaluation. The myeloid series appears hyperplastic and left-shifted with increased proportion of promyelocytes and myelocytes. Blasts do not appear increased. The erythroid series is reduced in number but progresses through the full maturation sequence without significant dysplasia. Megakaryocytes are increased in number with dwarf forms seen. Plasma cells and lymphocytes do not appear increased in number.   -Cytogenetics: Karyotype: 46,XX,t(9;22)(q34.1;q11.2)[20]; Interpretation: ABNORMAL FEMALE KARYOTYPE   The t(9;22)(q34.1;q11.2), resulting in formation of the [place] chromosome, is observed in 90-95% of cases with chronic myeloid leukemia, BCR-ABL1 positive. It is also observed in acute lymphoblastic leukemia/lymphoma and mixed phenotype acute leukemia (MPAL) with t(9;22)(q34.1;q11.2);BCR-ABL1 and in 1-2% of AML. These results should be nterpreted in the context of clinical and histopathologic findings. .    Prior Treatment: Dasatinib 100mg PO daily, started 9/29/2020 - mid Oct 2020. Current Treatment: Pending, Gleevec 400mg PO daily to start when pt receives med. History of Present Illness:   Roxana Patel is a 68 y.o. female with HTN, Hyperlipidemia, allergic rhinitis, GERD comes in for follow up of leukocytosis.  Per PCP notes, pt has had fatigue for past 4 months. Recently was treated with UTI in late June 2020 treated with Amoxicillin. She also developed a rash in late June, which was treated steroid ointment, but no oral Prednisone. Dermatologist felt this was a heat rash. She does endorse yeast type rash under breast or in groin which comes and goes, not new. No unintentional weight loss, good appetite. No fevers, chills, night sweats. Had a recent chest CT which was negative for PE. Back in Feb 2020, she felt severely exhausted for 1 week, and the recovered. She wondered if she had COVID19 virus. Outside labs:   1/21/20: WBC 10.6, Hgb 13.8, MCV 92,   7/20/20: WBC 20.5, Hgb 13, MCV 91,   7/27/20: WBC 17.3, Hgb 12.4, MCV 95,     After being on Dasatinib for 2 weeks, she states felt terrible - was angry at everyone, felt crazy, felt like having a persistent hot flash. When asked to elaborate on feeling crazy, she denies hallucinations or confusing, but does endorse mood problems while on the medication. She also had weight gain and leg swelling. She stopped Dasatinib at the last visit, started on Lasix daily. Gradually over the past 2 weeks, the leg swelling improved and she feels improved. Cardiac workup with EKG, echo negative. CXR did show pleural effusions and dopplers negative for DVT. Interval History:  Patient is seen for follow up up of CML. Patient feels well. She denies fatigue, fevers, chills, sweats, edema. States she is feeling well overall. However, she is concerned by her elevated WBC count. Past Medical History:   Diagnosis Date    Hypertension       Past Surgical History:   Procedure Laterality Date    HX HEENT      Appendectomy    HX ORTHOPAEDIC      Bilateral hip replacement   Had total hysterectomy for uterine prolapse in 2006 with pathology showing squamous metaplasia of cervix.      Social History     Tobacco Use    Smoking status: Former Smoker    Smokeless tobacco: Never Used   Substance Use Topics    Alcohol use: Not Currently      No family history on file. Current Outpatient Medications   Medication Sig    lisinopril-hydroCHLOROthiazide (PRINZIDE, ZESTORETIC) 20-12.5 mg per tablet Take 2 Tabs by mouth daily.  furosemide (LASIX) 20 mg tablet Take 1 Tab by mouth daily.  dasatinib (SPRYCEL) 100 mg tab Take 100 mg by mouth daily (with breakfast).  calcium citrate-vitamin d3 (CITRACAL+D) 315 mg-5 mcg (200 unit) tab Take 1 Tab by mouth daily (with breakfast).  cyanocobalamin 1,000 mcg tablet Take 5,000 mcg by mouth daily.  cranberry fruit concentrate (AZO CRANBERRY PO) Take  by mouth. No current facility-administered medications for this visit. Allergies   Allergen Reactions    Macrobid [Nitrofurantoin Monohyd/M-Cryst] Other (comments)     Red rash on arms and legs        Review of Systems: A complete review of systems was obtained, negative except as described above. Physical Exam:       Due to this being a TeleHealth evaluation, many elements of the physical examination are unable to be assessed. Constitutional: Appears well-developed and well-nourished in no apparent distress   Mental status: Alert and awake, Oriented to person/place/time, Able to follow commands  Eyes: EOM normal, Sclera normal, No visible ocular discharge  HENT: Normocephalic, atraumatic; Mouth/Throat: Moist mucous membranes, External Ears normal  Neck: No visualized mass  Pulmonary/Chest: Respiratory effort normal, No visualized signs of difficulty breathing or respiratory distress   Musculoskeletal: Normal gait with no signs of ataxia, Normal range of motion of neck  Neurological: No facial asymmetry (Cranial nerve 7 motor function), No gaze palsy  Skin: No significant exanthematous lesions or discoloration noted on facial skin  Psychiatric: Normal affect, normal judgment/insight.  No hallucinations       Results:     Lab Results   Component Value Date/Time    WBC 17.0 (H) 01/26/2021 02:05 PM HGB 12.5 2021 02:05 PM    HCT 38.4 2021 02:05 PM    PLATELET 765  02:05 PM    MCV 93.7 2021 02:05 PM    ABS. NEUTROPHILS 13.3 (H) 2021 02:05 PM     Lab Results   Component Value Date/Time    Sodium 137 2021 02:05 PM    Potassium 4.5 2021 02:05 PM    Chloride 103 2021 02:05 PM    CO2 28 2021 02:05 PM    Glucose 105 (H) 2021 02:05 PM    BUN 21 (H) 2021 02:05 PM    Creatinine 0.64 2021 02:05 PM    GFR est AA >60 2021 02:05 PM    GFR est non-AA >60 2021 02:05 PM    Calcium 10.3 (H) 2021 02:05 PM     Lab Results   Component Value Date/Time    Bilirubin, total 0.3 2021 02:05 PM    ALT (SGPT) 33 2021 02:05 PM    Alk. phosphatase 63 2021 02:05 PM    Protein, total 7.3 2021 02:05 PM    Albumin 4.2 2021 02:05 PM    Globulin 3.1 2021 02:05 PM     No results found for: IRON, FE, TIBC, IBCT, PSAT, FERR    No results found for: B12LT, FOL, RBCF  No results found for: TSH, TSH2, TSH3, TSHP, TSHEXT, TSHEXT  No results found for: HAMAT, HAAB, HABT, HAAT, HBSAG, HBSB, HBSAT, HBABN, HBCM, HBCAB, HBCAT, XBCABS, [facility], [address] [place] [address], XHEPCS, [id number], [address] [facility], HBCLT, [facility], [id number], [id number], [address] [place] [address], [id number], HBCMLT, [id number], HCGAT     20: POSITIVE for the BCR-ABL1:  e13a2 (b2a2, p210) 30.77; e14a2 (b3a2, p210) 56.03; e1a2 (p190) 0.03 fusion transcripts. 21: POSITIVE for the BCR-ABL1:  e13a2 (b2a2, p210) 13.2; e14a2 (b3a2, p210) 34.4; e1a2 (p190) 0.0175 fusion transcripts. Imagin/28/20 abdomen ultrasound:  IMPRESSION: Hepatic steatosis. No evidence for hepatosplenomegaly    Assessment & Plan:   Luci Galdamez is a 68 y.o. female is seen for evaluation of leukocytosis. 1. CML: Sokal and Euro score intermediate risk.  Discussed with patient that this a slow growing leukemia and can be effectively treated with an oral medication in the tyrosine kinase inhibitor class of meds such as Imatinib, Dasatinib, etc. Side effects of these medications include nausea, diarrhea, constipation, rash, liver test abnormalities, most of which are generally troublesome in the first 1-2 months, but then can resolve or improve over time. Previously printed patient information on CML for patient. Patient developed side effects of fluid overload and mood changes after 2 weeks of Dasatinib. She now has disease progression off of medication and is willing to try an alternate TKI. Given lower possible side effects and improved tolerability with Gleevec, I recommend trial of this medication. -- Start Gleevec 400mg PO daily - awaiting info on which specialty pharmacy to send this to. -- Pt instructed to notify us when she receives the medication. She can start taking it the same day. -- Return in 4 weeks for labs (CBC, CMP) and MD visit. 2. SOB / Pleural effusions / Pulm edema / LE edema:  Dasatinib can cause peripheral edema in up to 10% of cases. LE edema and weight both improved with 2 weeks of Lasix 20mg/d, which is now complete. Cardiac workup was negative for ischemia. Echo showed EF 60-65%. I wonder if patient has diastolic dysfunction. -- Seeing Dr. García Lynn next week    3. HTN: Was uncontrolled during last in person visit. She states it is usually better controlled. 4. Health maintenance: Last colonoscopy done 2016. Last mammogram 5/21/19.     5. H/o Erythematous rash: No pruritus. Was likely 2/2 Dasatinib and improved off of treatment. I appreciate the opportunity to participate in Ms. Adeline Goodpasture care. CC: Dr. Jovani Laureano      I was in the office while conducting this encounter. The patient was at her at home    Consent:  She and/or her healthcare decision maker is aware that this patient-initiated Telehealth encounter is a billable service, with coverage as determined by her insurance carrier.  She is aware that she may receive a bill and has provided verbal consent to proceed: Yes    Pursuant to the emergency declaration under the [facility], [facility] waiver authority and the [employer] Act, this Virtual  Visit was conducted, with patient's (and/or legal guardian's) consent, to reduce the patient's risk of exposure to COVID-19 and provide necessary medical care. Services were provided through a video synchronous discussion virtually to substitute for in-person visit.     Signed By: Marck Willard MD     February 18, 2021

## 2021-02-18 ENCOUNTER — VIRTUAL VISIT (OUTPATIENT)
Dept: ONCOLOGY | Age: 81
End: 2021-02-18
Payer: MEDICARE

## 2021-02-18 DIAGNOSIS — R06.02 SHORTNESS OF BREATH: ICD-10-CM

## 2021-02-18 DIAGNOSIS — I10 ESSENTIAL HYPERTENSION: ICD-10-CM

## 2021-02-18 DIAGNOSIS — C92.10 CML (CHRONIC MYELOCYTIC LEUKEMIA) (HCC): Primary | ICD-10-CM

## 2021-02-18 DIAGNOSIS — D72.828 OTHER ELEVATED WHITE BLOOD CELL (WBC) COUNT: ICD-10-CM

## 2021-02-18 PROCEDURE — G8756 NO BP MEASURE DOC: HCPCS | Performed by: INTERNAL MEDICINE

## 2021-02-18 PROCEDURE — 1090F PRES/ABSN URINE INCON ASSESS: CPT | Performed by: INTERNAL MEDICINE

## 2021-02-18 PROCEDURE — G8400 PT W/DXA NO RESULTS DOC: HCPCS | Performed by: INTERNAL MEDICINE

## 2021-02-18 PROCEDURE — G8428 CUR MEDS NOT DOCUMENT: HCPCS | Performed by: INTERNAL MEDICINE

## 2021-02-18 PROCEDURE — 99214 OFFICE O/P EST MOD 30 MIN: CPT | Performed by: INTERNAL MEDICINE

## 2021-02-18 PROCEDURE — 1101F PT FALLS ASSESS-DOCD LE1/YR: CPT | Performed by: INTERNAL MEDICINE

## 2021-02-18 PROCEDURE — G8432 DEP SCR NOT DOC, RNG: HCPCS | Performed by: INTERNAL MEDICINE

## 2021-02-18 RX ORDER — IMATINIB MESYLATE 400 MG/1
400 TABLET, FILM COATED ORAL DAILY
Qty: 30 TAB | Refills: 0 | Status: SHIPPED | OUTPATIENT
Start: 2021-02-18 | End: 2021-03-18 | Stop reason: SDUPTHER

## 2021-02-18 RX ORDER — IMATINIB MESYLATE 400 MG/1
400 TABLET, FILM COATED ORAL DAILY
Qty: 30 TAB | Refills: 0 | Status: CANCELLED | OUTPATIENT
Start: 2021-02-18 | End: 2021-03-20

## 2021-02-19 ENCOUNTER — TELEPHONE (OUTPATIENT)
Dept: ONCOLOGY | Age: 81
End: 2021-02-19

## 2021-02-19 NOTE — TELEPHONE ENCOUNTER
Prior authorization initiated via [facility] for [address].  Request approved 1/1/21-12/31/21 [facility]#[id number]

## 2021-03-08 DIAGNOSIS — C92.10 CML (CHRONIC MYELOCYTIC LEUKEMIA) (HCC): Primary | ICD-10-CM

## 2021-03-09 LAB
BASOPHILS # BLD AUTO: 0.1 X10E3/UL (ref 0–0.2)
BASOPHILS NFR BLD AUTO: 1 %
EOSINOPHIL # BLD AUTO: 0.2 X10E3/UL (ref 0–0.4)
EOSINOPHIL NFR BLD AUTO: 1 %
ERYTHROCYTE [DISTWIDTH] IN BLOOD BY AUTOMATED COUNT: 14.7 % (ref 11.7–15.4)
HCT VFR BLD AUTO: 32.9 % (ref 34–46.6)
HGB BLD-MCNC: 11.5 G/DL (ref 11.1–15.9)
IMM GRANULOCYTES # BLD AUTO: 0.1 X10E3/UL (ref 0–0.1)
IMM GRANULOCYTES NFR BLD AUTO: 1 %
LYMPHOCYTES # BLD AUTO: 1 X10E3/UL (ref 0.7–3.1)
LYMPHOCYTES NFR BLD AUTO: 6 %
MCH RBC QN AUTO: 30.7 PG (ref 26.6–33)
MCHC RBC AUTO-ENTMCNC: 35 G/DL (ref 31.5–35.7)
MCV RBC AUTO: 88 FL (ref 79–97)
MONOCYTES # BLD AUTO: 0.4 X10E3/UL (ref 0.1–0.9)
MONOCYTES NFR BLD AUTO: 3 %
NEUTROPHILS # BLD AUTO: 14.9 X10E3/UL (ref 1.4–7)
NEUTROPHILS NFR BLD AUTO: 88 %
PLATELET # BLD AUTO: 226 X10E3/UL (ref 150–450)
RBC # BLD AUTO: 3.74 X10E6/UL (ref 3.77–5.28)
WBC # BLD AUTO: 16.8 X10E3/UL (ref 3.4–10.8)

## 2021-03-15 RX ORDER — PREDNISONE 10 MG/1
10 TABLET ORAL
Qty: 30 TAB | Refills: 0 | Status: SHIPPED | OUTPATIENT
Start: 2021-03-15 | End: 2021-03-26 | Stop reason: SDUPTHER

## 2021-03-17 ENCOUNTER — TELEPHONE (OUTPATIENT)
Dept: ONCOLOGY | Age: 81
End: 2021-03-17

## 2021-03-17 NOTE — TELEPHONE ENCOUNTER
[address] at [facility]  [phone]      03/17/21 12:34 PM Message sent to patient via [place] to verify how many pills she has left of current supply of Imatinib.

## 2021-03-17 NOTE — TELEPHONE ENCOUNTER
Veronika from [facility] called and stated they need a new prescription sent for imatinib before her next fill.

## 2021-03-17 NOTE — TELEPHONE ENCOUNTER
Telephone call from [facility] for a refill on patient- Gleevec 400mg.   They stated they had sent 2 refill request      # [phone]

## 2021-03-18 ENCOUNTER — TELEPHONE (OUTPATIENT)
Dept: ONCOLOGY | Age: 81
End: 2021-03-18

## 2021-03-18 DIAGNOSIS — C92.10 CML (CHRONIC MYELOCYTIC LEUKEMIA) (HCC): Primary | ICD-10-CM

## 2021-03-18 DIAGNOSIS — C92.10 CML (CHRONIC MYELOCYTIC LEUKEMIA) (HCC): ICD-10-CM

## 2021-03-18 RX ORDER — IMATINIB MESYLATE 100 MG/1
200 TABLET, FILM COATED ORAL DAILY
Qty: 60 TAB | Refills: 0 | Status: SHIPPED | OUTPATIENT
Start: 2021-03-18 | End: 2021-04-14 | Stop reason: SDUPTHER

## 2021-03-18 NOTE — TELEPHONE ENCOUNTER
[employer] at [facility]  [phone]        03/18/21 10:18 AM Call placed to patient and spouse to check on her. Patient reports that she has been taking Imatinib 400 mg every other day, along with Prednisone 10 mg daily. Rash is now resolving. Rash had recurred when she split Imatinib pill in half but has since improved since switching to every other day regimen. Patient states rash is mild and tolerable. Advised this nurse would update Dr. Bridger Gilbert and call patient back regarding follow up appointment. They voiced understanding.    03/19/21 8:50 AM leemail message sent to patient advising she keep 03/26 appointment as scheduled. No need to have labs drawn prior to office visit. Asked that patient contact office if she has any additional questions.

## 2021-03-18 NOTE — TELEPHONE ENCOUNTER
[facility] at [facility]  [phone]        03/18/21 10:17 AM Called patient and spouse, Brie Salomondora. Updated of dose change and asked that they proceed with scheduling delivery of medication per Xin Teran NP. They voiced understanding. No further questions or concerns at this time.

## 2021-03-26 ENCOUNTER — OFFICE VISIT (OUTPATIENT)
Dept: ONCOLOGY | Age: 81
End: 2021-03-26
Payer: MEDICARE

## 2021-03-26 VITALS
HEART RATE: 71 BPM | WEIGHT: 140.2 LBS | DIASTOLIC BLOOD PRESSURE: 71 MMHG | SYSTOLIC BLOOD PRESSURE: 152 MMHG | BODY MASS INDEX: 26.47 KG/M2 | OXYGEN SATURATION: 95 % | RESPIRATION RATE: 16 BRPM | HEIGHT: 61 IN | TEMPERATURE: 97.1 F

## 2021-03-26 DIAGNOSIS — Z51.11 CHEMOTHERAPY MANAGEMENT, ENCOUNTER FOR: ICD-10-CM

## 2021-03-26 DIAGNOSIS — Z87.2 HISTORY OF DRUG RASH: ICD-10-CM

## 2021-03-26 DIAGNOSIS — R60.0 BILATERAL LOWER EXTREMITY EDEMA: ICD-10-CM

## 2021-03-26 DIAGNOSIS — C92.10 CML (CHRONIC MYELOCYTIC LEUKEMIA) (HCC): Primary | ICD-10-CM

## 2021-03-26 PROCEDURE — G8427 DOCREV CUR MEDS BY ELIG CLIN: HCPCS | Performed by: INTERNAL MEDICINE

## 2021-03-26 PROCEDURE — 1090F PRES/ABSN URINE INCON ASSESS: CPT | Performed by: INTERNAL MEDICINE

## 2021-03-26 PROCEDURE — G8510 SCR DEP NEG, NO PLAN REQD: HCPCS | Performed by: INTERNAL MEDICINE

## 2021-03-26 PROCEDURE — G8536 NO DOC ELDER MAL SCRN: HCPCS | Performed by: INTERNAL MEDICINE

## 2021-03-26 PROCEDURE — 99215 OFFICE O/P EST HI 40 MIN: CPT | Performed by: INTERNAL MEDICINE

## 2021-03-26 PROCEDURE — 1101F PT FALLS ASSESS-DOCD LE1/YR: CPT | Performed by: INTERNAL MEDICINE

## 2021-03-26 PROCEDURE — G8753 SYS BP > OR = 140: HCPCS | Performed by: INTERNAL MEDICINE

## 2021-03-26 PROCEDURE — G8400 PT W/DXA NO RESULTS DOC: HCPCS | Performed by: INTERNAL MEDICINE

## 2021-03-26 PROCEDURE — G8419 CALC BMI OUT NRM PARAM NOF/U: HCPCS | Performed by: INTERNAL MEDICINE

## 2021-03-26 PROCEDURE — G8754 DIAS BP LESS 90: HCPCS | Performed by: INTERNAL MEDICINE

## 2021-03-26 RX ORDER — PREDNISONE 10 MG/1
10 TABLET ORAL
Qty: 30 TAB | Refills: 3 | Status: SHIPPED | OUTPATIENT
Start: 2021-03-26 | End: 2021-06-08 | Stop reason: SDUPTHER

## 2021-03-26 RX ORDER — METHYLPREDNISOLONE 4 MG/1
TABLET ORAL
COMMUNITY
Start: 2021-03-06 | End: 2021-03-26 | Stop reason: ALTCHOICE

## 2021-03-26 RX ORDER — BENZONATATE 100 MG/1
CAPSULE ORAL
COMMUNITY
Start: 2021-03-09

## 2021-03-26 NOTE — PROGRESS NOTES
Chief Complaint   Patient presents with    Follow-up     Nayely Milton is a pleasant 68year old woman who presents as a follow up for CML.  She denies pain

## 2021-03-26 NOTE — PROGRESS NOTES
[address] [facility] at [address] [place] [address]  [phone]    Hematology / Oncology Established Visit    Reason for Visit:   Nayely Milton is a 68 y.o. female who is seen for follow up of CML. PCP is Dr. Ashli Allen.     Hematology Oncology Treatment History:     Diagnosis: Chronic myelogenous leukemia (CML)    Stage: N/A    Pathology:   - 8/1/20 Peripheral blood BCR-ABL1 FISH: 84% of nuclei positive for BCR/ABL1 fusion    -8/28/20 Bone marrow biopsy: Chronic myeloid leukemia, BCR-ABL1-positive, chronic phase   The bone marrow biopsy is 90% cellular and adequate for evaluation. The myeloid series appears hyperplastic and left-shifted with increased proportion of promyelocytes and myelocytes. Blasts do not appear increased. The erythroid series is reduced in number but progresses through the full maturation sequence without significant dysplasia. Megakaryocytes are increased in number with dwarf forms seen. Plasma cells and lymphocytes do not appear increased in number.   -Cytogenetics: Karyotype: 46,XX,t(9;22)(q34.1;q11.2)[20]; Interpretation: ABNORMAL FEMALE KARYOTYPE   The t(9;22)(q34.1;q11.2), resulting in formation of the [place] chromosome, is observed in 90-95% of cases with chronic myeloid leukemia, BCR-ABL1 positive. It is also observed in acute lymphoblastic leukemia/lymphoma and mixed phenotype acute leukemia (MPAL) with t(9;22)(q34.1;q11.2);BCR-ABL1 and in 1-2% of AML. These results should be nterpreted in the context of clinical and histopathologic findings. .    Prior Treatment: Dasatinib 100mg PO daily, started 9/29/2020 - mid Oct 2020. Current Treatment: Gleevec 200mg PO daily 3/19/21     History of Present Illness:   Nayely Milton is a 68 y.o. female with HTN, Hyperlipidemia, allergic rhinitis, GERD comes in for follow up of CML. Per PCP notes, pt has had fatigue for past 4 months. Recently was treated with UTI in late June 2020 treated with Amoxicillin.  She also developed a rash in late June, which was treated steroid ointment, but no oral Prednisone. Dermatologist felt this was a heat rash. She does endorse yeast type rash under breast or in groin which comes and goes, not new. No unintentional weight loss, good appetite. No fevers, chills, night sweats. Had a recent chest CT which was negative for PE. Back in Feb 2020, she felt severely exhausted for 1 week, and the recovered. She wondered if she had COVID19 virus. Outside labs:   1/21/20: WBC 10.6, Hgb 13.8, MCV 92,   7/20/20: WBC 20.5, Hgb 13, MCV 91,   7/27/20: WBC 17.3, Hgb 12.4, MCV 95,     After being on Dasatinib for 2 weeks, she states felt terrible - was angry at everyone, felt crazy, felt like having a persistent hot flash. When asked to elaborate on feeling crazy, she denies hallucinations or confusing, but does endorse mood problems while on the medication. She also had weight gain and leg swelling. She stopped Dasatinib at the last visit, started on Lasix daily. Gradually over the past 2 weeks, the leg swelling improved and she feels improved. Cardiac workup with EKG, echo negative. CXR did show pleural effusions and dopplers negative for DVT. Interval History:  Patient is seen for follow up up of CML. Took Gleevec 400mg daily from 2/24/21 - 3/6/21. Developed rash on trunk, arms after 10 days. Rash improved with Medrol dose pack and benadryl. Rash returned when she started Gleevec 200mg (half of the 400mg tab). Never took Gleevec 400mg every other day since the new prescription of Gleevec 200mg arrived quickly. Started Gleevec 200mg daily, Prednisone 10mg daily on 3/19/21 (past 6 days) without any problems. No rash, pruritic. No fluid retention or nausea. Takes Imodium occasionally for loose stools.        Past Medical History:   Diagnosis Date    Hypertension       Past Surgical History:   Procedure Laterality Date    HX HEENT      Appendectomy    HX ORTHOPAEDIC Bilateral hip replacement   Had total hysterectomy for uterine prolapse in 2006 with pathology showing squamous metaplasia of cervix. Social History     Tobacco Use    Smoking status: Former Smoker    Smokeless tobacco: Never Used   Substance Use Topics    Alcohol use: Not Currently      No family history on file. Current Outpatient Medications   Medication Sig    imatinib (Gleevec) 100 mg tablet Take 2 Tabs by mouth daily for 30 days. Indications: chronic phase [place] chromosome positive chronic myelocytic leukemia    predniSONE (DELTASONE) 10 mg tablet Take 10 mg by mouth daily (with breakfast).  lisinopril-hydroCHLOROthiazide (PRINZIDE, ZESTORETIC) 20-12.5 mg per tablet Take 2 Tabs by mouth daily.  furosemide (LASIX) 20 mg tablet Take 1 Tab by mouth daily.  calcium citrate-vitamin d3 (CITRACAL+D) 315 mg-5 mcg (200 unit) tab Take 1 Tab by mouth daily (with breakfast).  cyanocobalamin 1,000 mcg tablet Take 5,000 mcg by mouth daily.  cranberry fruit concentrate (AZO CRANBERRY PO) Take  by mouth. No current facility-administered medications for this visit. Allergies   Allergen Reactions    Macrobid [Nitrofurantoin Monohyd/M-Cryst] Other (comments)     Red rash on arms and legs        Review of Systems: A complete review of systems was obtained, negative except as described above. Physical Exam:     Visit Vitals  BP (!) 152/71   Pulse 71   Temp 97.1 °F (36.2 °C)   Resp 16   Ht 5' 1\" (1.549 m)   Wt 140 lb 3.2 oz (63.6 kg)   SpO2 95%   BMI 26.49 kg/m²     ECOG PS: 1  General: Well developed, no acute distress  Eyes: PERRLA, EOMI, anicteric sclerae  HENT: Atraumatic, OP clear, TMs intact without erythema  Neck: Supple  Lymphatic: No cervical, supraclavicular, axillary adenopathy  Respiratory: CTAB, normal respiratory effort  CV: Normal rate, regular rhythm, no murmurs, 1+ pitting edema  GI: Soft, nontender, nondistended, no masses  MS: Normal gait and station.  Digits without clubbing or cyanosis. Skin: No rashes, ecchymoses, or petechiae. Normal temperature, turgor, and texture. Neuro/Psych: Alert, oriented. Moving all 4 extremities. Appropriate affect, normal judgment/insight. Results:     Lab Results   Component Value Date/Time    WBC 16.8 (H) 2021 04:51 PM    HGB 11.5 2021 04:51 PM    HCT 32.9 (L) 2021 04:51 PM    PLATELET 285  04:51 PM    MCV 88 2021 04:51 PM    ABS. NEUTROPHILS 14.9 (H) 2021 04:51 PM     Lab Results   Component Value Date/Time    Sodium 137 2021 02:05 PM    Potassium 4.5 2021 02:05 PM    Chloride 103 2021 02:05 PM    CO2 28 2021 02:05 PM    Glucose 105 (H) 2021 02:05 PM    BUN 21 (H) 2021 02:05 PM    Creatinine 0.64 2021 02:05 PM    GFR est AA >60 2021 02:05 PM    GFR est non-AA >60 2021 02:05 PM    Calcium 10.3 (H) 2021 02:05 PM     Lab Results   Component Value Date/Time    Bilirubin, total 0.3 2021 02:05 PM    ALT (SGPT) 33 2021 02:05 PM    Alk. phosphatase 63 2021 02:05 PM    Protein, total 7.3 2021 02:05 PM    Albumin 4.2 2021 02:05 PM    Globulin 3.1 2021 02:05 PM     No results found for: IRON, FE, TIBC, IBCT, PSAT, FERR    No results found for: B12LT, FOL, RBCF  No results found for: TSH, TSH2, TSH3, TSHP, TSHEXT, TSHEXT  No results found for: HAMAT, HAAB, HABT, HAAT, HBSAG, HBSB, HBSAT, HBABN, HBCM, HBCAB, HBCAT, XBCABS, [address] [place], [place] [address]PCS, [id number], [facility], [facility], HBCLT, [address] [facility] [address], [id number], [id number], [facility], [id number], HBCMLT, [id number], HCGAT     20: POSITIVE for the BCR-ABL1:  e13a2 (b2a2, p210) 30.77; e14a2 (b3a2, p210) 56.03; e1a2 (p190) 0.03 fusion transcripts. 21: POSITIVE for the BCR-ABL1:  e13a2 (b2a2, p210) 13.2; e14a2 (b3a2, p210) 34.4; e1a2 (p190) 0.0175 fusion transcripts. Imagin/28/20 abdomen ultrasound:  IMPRESSION: Hepatic steatosis.  No evidence for hepatosplenomegaly    Assessment & Plan:   Pretty Porter is a 68 y.o. female is seen for evaluation of leukocytosis. 1. CML: Sokal and Euro score intermediate risk. Discussed with patient that this a slow growing leukemia and can be effectively treated with an oral medication in the tyrosine kinase inhibitor class of meds such as Imatinib, Dasatinib, etc. Side effects of these medications include nausea, diarrhea, constipation, rash, liver test abnormalities, most of which are generally troublesome in the first 1-2 months, but then can resolve or improve over time. Previously printed patient information on CML for patient. Patient developed side effects of fluid overload and mood changes after 2 weeks of Dasatinib. She now has disease progression off of medication and is willing to try an alternate TKI. Given lower possible side effects and improved tolerability with Gleevec, I recommend trial of this medication. -- Gleevec 200mg PO daily  -- Prednisone 10mg daily  -- Labs (CBC, CMP) on 4/16/21 - will call with results  -- Labs (CBC, CMP, bcr-abl) on 5/28/21 - will review at next visit  -- Return on 6/11/21 for MD visit   -- Had both COVID vaccines. 2. SOB / Pleural effusions / Pulm edema / LE edema:  Imatinib can cause peripheral edema in up to 10% of cases. Cardiac workup was negative for ischemia. Echo showed EF 60-65%. Pt may have diastolic dysfunction. Dr. Kevin Hernandez ordered Hettie Alondra nuclear stress test but pt has not completed. -- On Lasix. 3. HTN:  Lisinopril/HCTZ 40-25mg daily. Managed by Dr. Kevin Hernandez. 4. Health maintenance: Last colonoscopy done 2016. Last mammogram 5/21/19.     5. H/o Erythematous rash: No pruritus. Was likely 2/2 Dasatinib and improved off of treatment. I appreciate the opportunity to participate in Ms. Heavenly Craig care.     CC: Dr. Sofía Bills      Signed By: Richard Renteria MD     March 26, 2021

## 2021-03-26 NOTE — PATIENT INSTRUCTIONS
Labs on 4/16/21 - I will call you to discuss results Labs again on 5/28/21 - I will wait and discuss results with you on June 11th. Return visit to see me on June 11th.

## 2021-04-05 DIAGNOSIS — C92.10 CML (CHRONIC MYELOCYTIC LEUKEMIA) (HCC): ICD-10-CM

## 2021-04-05 RX ORDER — IMATINIB MESYLATE 100 MG/1
TABLET, FILM COATED ORAL
Refills: 0 | OUTPATIENT
Start: 2021-04-05

## 2021-04-09 ENCOUNTER — TELEPHONE (OUTPATIENT)
Dept: ONCOLOGY | Age: 81
End: 2021-04-09

## 2021-04-09 NOTE — TELEPHONE ENCOUNTER
[address] at [facility]  [phone]        04/09/21 1:47 PM Return call placed to [facility] Aldana and spoke with Anthony Deng. Advised that office received refill request for Imatinib; however, patient not in need of refill yet. Advised this office will submit refill next week. Anthony Deng voiced understanding and stated she noted above in patient's profile with pharmacy. No further questions or concerns at this time.

## 2021-04-09 NOTE — TELEPHONE ENCOUNTER
Harlan Candelaria left a voicemail stating that they are following up on a refill request that was faxed over for Imatinib 100 mg. Please advise.     CB# [phone]    FX# [phone]

## 2021-04-14 RX ORDER — IMATINIB MESYLATE 100 MG/1
200 TABLET, FILM COATED ORAL DAILY
Qty: 60 TAB | Refills: 0 | Status: SHIPPED | OUTPATIENT
Start: 2021-04-14 | End: 2021-05-04

## 2021-04-14 NOTE — TELEPHONE ENCOUNTER
[facility] [employer] at [facility]  [phone]        04/14/21 9:13 AM Refill for Gleevec 200 mg daily pended to NP for review.

## 2021-04-15 LAB
ALBUMIN SERPL-MCNC: 4 G/DL (ref 3.5–5)
ALBUMIN/GLOB SERPL: 1.3 {RATIO} (ref 1.1–2.2)
ALP SERPL-CCNC: 53 U/L (ref 45–117)
ALT SERPL-CCNC: 31 U/L (ref 12–78)
ANION GAP SERPL CALC-SCNC: 5 MMOL/L (ref 5–15)
AST SERPL-CCNC: 20 U/L (ref 15–37)
BASOPHILS # BLD: 0.1 K/UL (ref 0–0.1)
BASOPHILS NFR BLD: 1 % (ref 0–1)
BILIRUB SERPL-MCNC: 0.4 MG/DL (ref 0.2–1)
BUN SERPL-MCNC: 18 MG/DL (ref 6–20)
BUN/CREAT SERPL: 23 (ref 12–20)
CALCIUM SERPL-MCNC: 9.9 MG/DL (ref 8.5–10.1)
CHLORIDE SERPL-SCNC: 106 MMOL/L (ref 97–108)
CO2 SERPL-SCNC: 29 MMOL/L (ref 21–32)
CREAT SERPL-MCNC: 0.77 MG/DL (ref 0.55–1.02)
DIFFERENTIAL METHOD BLD: ABNORMAL
EOSINOPHIL # BLD: 0 K/UL (ref 0–0.4)
EOSINOPHIL NFR BLD: 0 % (ref 0–7)
ERYTHROCYTE [DISTWIDTH] IN BLOOD BY AUTOMATED COUNT: 15.7 % (ref 11.5–14.5)
GLOBULIN SER CALC-MCNC: 3 G/DL (ref 2–4)
GLUCOSE SERPL-MCNC: 113 MG/DL (ref 65–100)
HCT VFR BLD AUTO: 35.5 % (ref 35–47)
HGB BLD-MCNC: 11.3 G/DL (ref 11.5–16)
IMM GRANULOCYTES # BLD AUTO: 0 K/UL (ref 0–0.04)
IMM GRANULOCYTES NFR BLD AUTO: 0 % (ref 0–0.5)
LYMPHOCYTES # BLD: 0.7 K/UL (ref 0.8–3.5)
LYMPHOCYTES NFR BLD: 7 % (ref 12–49)
MCH RBC QN AUTO: 31.2 PG (ref 26–34)
MCHC RBC AUTO-ENTMCNC: 31.8 G/DL (ref 30–36.5)
MCV RBC AUTO: 98.1 FL (ref 80–99)
MONOCYTES # BLD: 0.4 K/UL (ref 0–1)
MONOCYTES NFR BLD: 4 % (ref 5–13)
NEUTS SEG # BLD: 8.6 K/UL (ref 1.8–8)
NEUTS SEG NFR BLD: 88 % (ref 32–75)
NRBC # BLD: 0 K/UL (ref 0–0.01)
NRBC BLD-RTO: 0 PER 100 WBC
PLATELET # BLD AUTO: 191 K/UL (ref 150–400)
PMV BLD AUTO: 11.8 FL (ref 8.9–12.9)
POTASSIUM SERPL-SCNC: 4.1 MMOL/L (ref 3.5–5.1)
PROT SERPL-MCNC: 7 G/DL (ref 6.4–8.2)
RBC # BLD AUTO: 3.62 M/UL (ref 3.8–5.2)
RBC MORPH BLD: ABNORMAL
RBC MORPH BLD: ABNORMAL
SODIUM SERPL-SCNC: 140 MMOL/L (ref 136–145)
WBC # BLD AUTO: 9.8 K/UL (ref 3.6–11)

## 2021-04-22 RX ORDER — LISINOPRIL AND HYDROCHLOROTHIAZIDE 12.5; 2 MG/1; MG/1
TABLET ORAL
Qty: 180 TAB | Refills: 2 | Status: SHIPPED | OUTPATIENT
Start: 2021-04-22

## 2021-04-22 NOTE — TELEPHONE ENCOUNTER
Per VO of Dr. Russell Mccarthy: 11/16/2020    Future Appointments   Date Time Provider Mone Wood   6/8/2021  1:30 PM Nolvia Diehl MD [facility]       Requested Prescriptions     Pending Prescriptions Disp Refills    lisinopril-hydroCHLOROthiazide (PRINZIDE, ZESTORETIC) 20-12.5 mg per tablet [Pharmacy Med Name: LISINOPRIL-HCTZ 20-12.5 MG TAB] 180 Tab 0     Sig: TAKE 2 TABLETS BY MOUTH EVERY DAY

## 2021-05-05 DIAGNOSIS — C92.10 CML (CHRONIC MYELOCYTIC LEUKEMIA) (HCC): ICD-10-CM

## 2021-05-05 RX ORDER — IMATINIB MESYLATE 100 MG/1
TABLET, FILM COATED ORAL
Qty: 60 TAB | Refills: 0 | OUTPATIENT
Start: 2021-05-05

## 2021-05-05 NOTE — TELEPHONE ENCOUNTER
[address] Fern[address] at [facility]  [phone]        05/05/21 10:25 AM Refill of Gleevec pended to NP for review.

## 2021-05-25 LAB
ALBUMIN SERPL-MCNC: 4.4 G/DL (ref 3.7–4.7)
ALBUMIN/GLOB SERPL: 2.2 {RATIO} (ref 1.2–2.2)
ALP SERPL-CCNC: 45 IU/L (ref 48–121)
ALT SERPL-CCNC: 19 IU/L (ref 0–32)
AST SERPL-CCNC: 21 IU/L (ref 0–40)
BASOPHILS # BLD AUTO: 0.1 X10E3/UL (ref 0–0.2)
BASOPHILS NFR BLD AUTO: 1 %
BILIRUB SERPL-MCNC: 0.3 MG/DL (ref 0–1.2)
BUN SERPL-MCNC: 28 MG/DL (ref 8–27)
BUN/CREAT SERPL: 27 (ref 12–28)
CALCIUM SERPL-MCNC: 10.3 MG/DL (ref 8.7–10.3)
CHLORIDE SERPL-SCNC: 99 MMOL/L (ref 96–106)
CO2 SERPL-SCNC: 25 MMOL/L (ref 20–29)
CREAT SERPL-MCNC: 1.02 MG/DL (ref 0.57–1)
EOSINOPHIL # BLD AUTO: 0.1 X10E3/UL (ref 0–0.4)
EOSINOPHIL NFR BLD AUTO: 1 %
ERYTHROCYTE [DISTWIDTH] IN BLOOD BY AUTOMATED COUNT: 13.3 % (ref 11.7–15.4)
GLOBULIN SER CALC-MCNC: 2 G/DL (ref 1.5–4.5)
GLUCOSE SERPL-MCNC: 117 MG/DL (ref 65–99)
HCT VFR BLD AUTO: 36.3 % (ref 34–46.6)
HGB BLD-MCNC: 12.7 G/DL (ref 11.1–15.9)
IMM GRANULOCYTES # BLD AUTO: 0 X10E3/UL (ref 0–0.1)
IMM GRANULOCYTES NFR BLD AUTO: 0 %
LYMPHOCYTES # BLD AUTO: 1.3 X10E3/UL (ref 0.7–3.1)
LYMPHOCYTES NFR BLD AUTO: 15 %
MCH RBC QN AUTO: 32.2 PG (ref 26.6–33)
MCHC RBC AUTO-ENTMCNC: 35 G/DL (ref 31.5–35.7)
MCV RBC AUTO: 92 FL (ref 79–97)
MONOCYTES # BLD AUTO: 1.1 X10E3/UL (ref 0.1–0.9)
MONOCYTES NFR BLD AUTO: 12 %
NEUTROPHILS # BLD AUTO: 6.2 X10E3/UL (ref 1.4–7)
NEUTROPHILS NFR BLD AUTO: 71 %
PLATELET # BLD AUTO: 281 X10E3/UL (ref 150–450)
POTASSIUM SERPL-SCNC: 3.9 MMOL/L (ref 3.5–5.2)
PROT SERPL-MCNC: 6.4 G/DL (ref 6–8.5)
RBC # BLD AUTO: 3.94 X10E6/UL (ref 3.77–5.28)
SODIUM SERPL-SCNC: 141 MMOL/L (ref 134–144)
WBC # BLD AUTO: 8.7 X10E3/UL (ref 3.4–10.8)

## 2021-05-30 LAB
BACKGROUND: 480503: NORMAL
INTERPRETATION: 480488: POSITIVE
LAB DIRECTOR NAME PROVIDER: NORMAL
REF LAB TEST METHOD: NORMAL
T(ABL1,BCR)B2A2/CONTROL BLD/T: 0.06 %
T(ABL1,BCR)B3A2/CONTROL BLD/T: 0.48 %
T(ABL1,BCR)E1A2/CONTROL BLD/T: NORMAL %

## 2021-06-01 NOTE — PROGRESS NOTES
[address] [facility] at [address] [place] [address]  [phone]    Hematology / Oncology Established Visit    Reason for Visit:   Briana Block is a 80 y.o. female who is seen for follow up of CML. PCP is Dr. Shakila Gilliam.     Hematology Oncology Treatment History:     Diagnosis: Chronic myelogenous leukemia (CML)    Stage: N/A    Pathology:   - 8/1/20 Peripheral blood BCR-ABL1 FISH: 84% of nuclei positive for BCR/ABL1 fusion    -8/28/20 Bone marrow biopsy: Chronic myeloid leukemia, BCR-ABL1-positive, chronic phase   The bone marrow biopsy is 90% cellular and adequate for evaluation. The myeloid series appears hyperplastic and left-shifted with increased proportion of promyelocytes and myelocytes. Blasts do not appear increased. The erythroid series is reduced in number but progresses through the full maturation sequence without significant dysplasia. Megakaryocytes are increased in number with dwarf forms seen. Plasma cells and lymphocytes do not appear increased in number.   -Cytogenetics: Karyotype: 46,XX,t(9;22)(q34.1;q11.2)[20]; Interpretation: ABNORMAL FEMALE KARYOTYPE   The t(9;22)(q34.1;q11.2), resulting in formation of the [place] chromosome, is observed in 90-95% of cases with chronic myeloid leukemia, BCR-ABL1 positive. It is also observed in acute lymphoblastic leukemia/lymphoma and mixed phenotype acute leukemia (MPAL) with t(9;22)(q34.1;q11.2);BCR-ABL1 and in 1-2% of AML. These results should be nterpreted in the context of clinical and histopathologic findings. .    Prior Treatment: Dasatinib 100mg PO daily, started 9/29/2020 - mid Oct 2020 (discontinued for mood changes, fluid retention). Current Treatment: Gleevec 200mg PO daily 3/19/21 (400mg dose caused rash)    History of Present Illness:   Briana Block is a 80 y.o. female with HTN, Hyperlipidemia, allergic rhinitis, GERD comes in for follow up of CML. Per PCP notes, pt has had fatigue for past 4 months.  Recently was treated with UTI in late June 2020 treated with Amoxicillin. She also developed a rash in late June, which was treated steroid ointment, but no oral Prednisone. Dermatologist felt this was a heat rash. She does endorse yeast type rash under breast or in groin which comes and goes, not new. No unintentional weight loss, good appetite. No fevers, chills, night sweats. Had a recent chest CT which was negative for PE. Back in Feb 2020, she felt severely exhausted for 1 week, and the recovered. She wondered if she had COVID19 virus. Outside labs:   1/21/20: WBC 10.6, Hgb 13.8, MCV 92,   7/20/20: WBC 20.5, Hgb 13, MCV 91,   7/27/20: WBC 17.3, Hgb 12.4, MCV 95,     After being on Dasatinib for 2 weeks, she states felt terrible - was angry at everyone, felt crazy, felt like having a persistent hot flash. When asked to elaborate on feeling crazy, she denies hallucinations or confusing, but does endorse mood problems while on the medication. She also had weight gain and leg swelling. She stopped Dasatinib at the last visit, started on Lasix daily. Gradually over the past 2 weeks, the leg swelling improved and she feels improved. Cardiac workup with EKG, echo negative. CXR did show pleural effusions and dopplers negative for DVT. Interval History:  Patient is seen for follow up up of CML, currently on Gleevec 200mg daily. No return of rash or pruritis. Not having any significant diarrhea, but does require Imodium approx once a week or less if she has > 3 loose stools in a day. No fluid retention or nausea. PMHx: HTN  PSurgHx: Appendectomy, bilateral hip replacement, total hysterectomy for uterine prolapse in 2006 with pathology showing squamous metaplasia of cervix. SHx: Former smoker, No EtOH. . FHx: No blood disorders per patient. Review of Systems: A complete review of systems was obtained, negative except as described above.     Physical Exam:     Visit Vitals  BP (!) 160/66   Pulse 76   Temp 97.8 °F (36.6 °C)   Resp 16   Ht 5' 1\" (1.549 m)   Wt 141 lb (64 kg)   SpO2 96%   BMI 26.64 kg/m²     ECOG PS: 1  General: Well developed, no acute distress  Eyes: PERRLA, EOMI, anicteric sclerae  HENT: Atraumatic, OP clear, TMs intact without erythema  Neck: Supple  Lymphatic: No cervical, supraclavicular, axillary adenopathy  Respiratory: CTAB, normal respiratory effort  CV: Normal rate, regular rhythm, no murmurs, 1+ pitting edema  GI: Soft, nontender, nondistended, no masses  MS: Normal gait and station. Digits without clubbing or cyanosis. Skin: No rashes, ecchymoses, or petechiae. Normal temperature, turgor, and texture. Neuro/Psych: Alert, oriented. Moving all 4 extremities. Appropriate affect, normal judgment/insight. Results:     Lab Results   Component Value Date/Time    WBC 8.7 05/24/2021 10:50 AM    HGB 12.7 05/24/2021 10:50 AM    HCT 36.3 05/24/2021 10:50 AM    PLATELET 892 02/57/5281 10:50 AM    MCV 92 05/24/2021 10:50 AM    ABS. NEUTROPHILS 6.2 05/24/2021 10:50 AM     Lab Results   Component Value Date/Time    Sodium 141 05/24/2021 10:50 AM    Potassium 3.9 05/24/2021 10:50 AM    Chloride 99 05/24/2021 10:50 AM    CO2 25 05/24/2021 10:50 AM    Glucose 117 (H) 05/24/2021 10:50 AM    BUN 28 (H) 05/24/2021 10:50 AM    Creatinine 1.02 (H) 05/24/2021 10:50 AM    GFR est AA 61 05/24/2021 10:50 AM    GFR est non-AA 53 (L) 05/24/2021 10:50 AM    Calcium 10.3 05/24/2021 10:50 AM     Lab Results   Component Value Date/Time    Bilirubin, total 0.3 05/24/2021 10:50 AM    ALT (SGPT) 19 05/24/2021 10:50 AM    Alk.  phosphatase 45 (L) 05/24/2021 10:50 AM    Protein, total 6.4 05/24/2021 10:50 AM    Albumin 4.4 05/24/2021 10:50 AM    Globulin 3.0 04/15/2021 11:25 AM     No results found for: IRON, FE, TIBC, IBCT, PSAT, FERR    No results found for: B12LT, FOL, RBCF  No results found for: TSH, TSH2, TSH3, TSHP, TSHEXT, TSHEXT  No results found for: HAMAT, HAAB, HABT, HAAT, HBSAG, HBSB, HBSAT, [address] [facility], [address], [address], HBCAT, Lizbeth Mercadop, [address] [facility], [address] [facility] [address], [address] [facility], [id number], [address] [facility], [facility] access hospital, [address], [address] [place] [address], [id number], [id number], [address] [place] [address], [id number], HBCMLT, [id number], HCGAT     20: POSITIVE for the BCR-ABL1:  e13a2 (b2a2, p210) 30.77; e14a2 (b3a2, p210) 56.03; e1a2 (p190) 0.03 fusion transcripts. 21: POSITIVE for the BCR-ABL1:  e13a2 (b2a2, p210) 13.2; e14a2 (b3a2, p210) 34.4; e1a2 (p190) 0.0175 fusion transcripts. 21: POSITIVE for the BCR-ABL1:  e13a2 (b2a2, p210) 0.0614 and e14a2 (b3a2, p210) 0.4772 fusion transcripts. Imagin/28/20 abdomen ultrasound:  IMPRESSION: Hepatic steatosis. No evidence for hepatosplenomegaly    Assessment & Plan:   Laorn Andersen is a 80 y.o. female is seen for evaluation of leukocytosis. 1. CML: Sokal and Euro score intermediate risk. Discussed with patient that this a slow growing leukemia and can be effectively treated with an oral medication in the tyrosine kinase inhibitor class of meds such as Imatinib, Dasatinib, etc. Side effects of these medications include nausea, diarrhea, constipation, rash, liver test abnormalities, most of which are generally troublesome in the first 1-2 months, but then can resolve or improve over time. Previously printed patient information on CML for patient. Great response thus far on Gleevec 200mg daily with reduction in all 3 BCR-ABL1 transcripts. -- Gleevec 200mg PO daily  -- Prednisone 10mg daily - pt does not wish to taper off  -- Labs (CBC, CMP) on 21 - will call with results  -- Labs (CBC, CMP, bcr-abl) on 21 - will review at next visit  -- Return on 21 for MD visit   -- Had both COVID vaccines. 2. SOB / Pleural effusions / Pulm edema / LE edema:  Imatinib can cause peripheral edema in up to 10% of cases. Cardiac workup was negative for ischemia. Echo showed EF 60-65%. Pt may have diastolic dysfunction. Dr. Alex Grajeda ordered Clarene Pop nuclear stress test but pt has not completed. No longer on Lasix. 3. HTN:  Lisinopril/HCTZ 40-25mg daily. Managed by Dr. Asif Ricardo. 4. Health maintenance: Last colonoscopy done 2016. Last mammogram 5/21/19.     5. H/o Erythematous rash: No pruritus. Was likely 2/2 Dasatinib and improved off of treatment. I appreciate the opportunity to participate in Ms. Prieto Crow care.     CC: Dr. Sameer Downey      Signed By: Jose Beltran MD     June 8, 2021

## 2021-06-02 DIAGNOSIS — C92.10 CML (CHRONIC MYELOCYTIC LEUKEMIA) (HCC): ICD-10-CM

## 2021-06-02 RX ORDER — IMATINIB MESYLATE 100 MG/1
TABLET, FILM COATED ORAL
Qty: 60 TABLET | Refills: 0 | Status: SHIPPED | OUTPATIENT
Start: 2021-06-02 | End: 2021-07-07 | Stop reason: SDUPTHER

## 2021-06-02 NOTE — TELEPHONE ENCOUNTER
[facility] at [facility]  [phone]      06/02/21 12:25 PM Refill of Gleevec pended to NP for review. Last filled 05/04 for #60 with 0 refills. Patient has MD visit scheduled for 06/08/21.

## 2021-06-08 ENCOUNTER — TELEPHONE (OUTPATIENT)
Dept: ONCOLOGY | Age: 81
End: 2021-06-08

## 2021-06-08 ENCOUNTER — OFFICE VISIT (OUTPATIENT)
Dept: ONCOLOGY | Age: 81
End: 2021-06-08
Payer: MEDICARE

## 2021-06-08 VITALS
WEIGHT: 141 LBS | SYSTOLIC BLOOD PRESSURE: 160 MMHG | RESPIRATION RATE: 16 BRPM | BODY MASS INDEX: 26.62 KG/M2 | OXYGEN SATURATION: 96 % | HEIGHT: 61 IN | DIASTOLIC BLOOD PRESSURE: 66 MMHG | TEMPERATURE: 97.8 F | HEART RATE: 76 BPM

## 2021-06-08 DIAGNOSIS — C92.10 CML (CHRONIC MYELOCYTIC LEUKEMIA) (HCC): Primary | ICD-10-CM

## 2021-06-08 DIAGNOSIS — Z87.2 HISTORY OF DRUG RASH: ICD-10-CM

## 2021-06-08 DIAGNOSIS — Z51.11 CHEMOTHERAPY MANAGEMENT, ENCOUNTER FOR: ICD-10-CM

## 2021-06-08 DIAGNOSIS — I10 ESSENTIAL HYPERTENSION: ICD-10-CM

## 2021-06-08 PROCEDURE — G8510 SCR DEP NEG, NO PLAN REQD: HCPCS | Performed by: INTERNAL MEDICINE

## 2021-06-08 PROCEDURE — 1090F PRES/ABSN URINE INCON ASSESS: CPT | Performed by: INTERNAL MEDICINE

## 2021-06-08 PROCEDURE — G8536 NO DOC ELDER MAL SCRN: HCPCS | Performed by: INTERNAL MEDICINE

## 2021-06-08 PROCEDURE — G8754 DIAS BP LESS 90: HCPCS | Performed by: INTERNAL MEDICINE

## 2021-06-08 PROCEDURE — G8400 PT W/DXA NO RESULTS DOC: HCPCS | Performed by: INTERNAL MEDICINE

## 2021-06-08 PROCEDURE — G8753 SYS BP > OR = 140: HCPCS | Performed by: INTERNAL MEDICINE

## 2021-06-08 PROCEDURE — 1101F PT FALLS ASSESS-DOCD LE1/YR: CPT | Performed by: INTERNAL MEDICINE

## 2021-06-08 PROCEDURE — 99215 OFFICE O/P EST HI 40 MIN: CPT | Performed by: INTERNAL MEDICINE

## 2021-06-08 PROCEDURE — G8427 DOCREV CUR MEDS BY ELIG CLIN: HCPCS | Performed by: INTERNAL MEDICINE

## 2021-06-08 PROCEDURE — G8419 CALC BMI OUT NRM PARAM NOF/U: HCPCS | Performed by: INTERNAL MEDICINE

## 2021-06-08 RX ORDER — PREDNISONE 10 MG/1
10 TABLET ORAL
Qty: 30 TABLET | Refills: 3 | Status: SHIPPED | OUTPATIENT
Start: 2021-06-08 | End: 2021-11-30 | Stop reason: ALTCHOICE

## 2021-06-08 RX ORDER — PREDNISONE 10 MG/1
10 TABLET ORAL
Qty: 30 TABLET | Refills: 3 | Status: SHIPPED | OUTPATIENT
Start: 2021-06-08 | End: 2021-06-08 | Stop reason: SDUPTHER

## 2021-06-08 NOTE — TELEPHONE ENCOUNTER
Cheril Gosselin from [facility] called and asked if they should be waiting on Imatinib to be sent in or is it only going to be the prednisone. Please advise and call Cheril Gosselin back.     # [phone]

## 2021-06-08 NOTE — PROGRESS NOTES
Chief Complaint   Patient presents with    Follow-up     Hardeep Hi is a pleasant 80year old woman who presents as a follow up for CML.  She denies pain

## 2021-06-08 NOTE — LETTER
6/8/2021 Patient: Sydni Alfonso YOB: 1940 Date of Visit: 6/8/2021 Camden Mike MD 
[address] [facility] [address] [facility] [place] [address] Via Fax: [phone] Dear Camden Mike MD, Thank you for referring Ms. Sydni Alfonso to [address] [place] [address] for evaluation. My notes for this consultation are attached. If you have questions, please do not hesitate to call me. I look forward to following your patient along with you. Sincerely, Jeyson Mujica MD

## 2021-06-09 NOTE — TELEPHONE ENCOUNTER
[address] at [place] [address]  [phone]        06/09/21 9:35 AM Called [id number] and spoke with pharmacist. Cancelled prescription for prednisone, per Dr. Sony Jasso. Verified that they had received Gleevec prescription on 06/02. No further questions or concerns at this time.

## 2021-07-07 DIAGNOSIS — C92.10 CML (CHRONIC MYELOCYTIC LEUKEMIA) (HCC): ICD-10-CM

## 2021-07-07 RX ORDER — IMATINIB MESYLATE 100 MG/1
TABLET, FILM COATED ORAL
Qty: 60 TABLET | Refills: 3 | Status: SHIPPED | OUTPATIENT
Start: 2021-07-07 | End: 2021-11-02 | Stop reason: SDUPTHER

## 2021-07-07 NOTE — TELEPHONE ENCOUNTER
[address] at [place]  [phone]        07/07/21 2:40 PM Refill of Imatinib pended to NP for review.

## 2021-08-13 LAB
ALBUMIN SERPL-MCNC: 4.5 G/DL (ref 3.7–4.7)
ALBUMIN/GLOB SERPL: 2.6 {RATIO} (ref 1.2–2.2)
ALP SERPL-CCNC: 48 IU/L (ref 48–121)
ALT SERPL-CCNC: 21 IU/L (ref 0–32)
AST SERPL-CCNC: 18 IU/L (ref 0–40)
BACKGROUND: 480503: NORMAL
BASOPHILS # BLD AUTO: 0 X10E3/UL (ref 0–0.2)
BASOPHILS NFR BLD AUTO: 0 %
BILIRUB SERPL-MCNC: 0.2 MG/DL (ref 0–1.2)
BUN SERPL-MCNC: 13 MG/DL (ref 8–27)
BUN/CREAT SERPL: 16 (ref 12–28)
CALCIUM SERPL-MCNC: 9.1 MG/DL (ref 8.7–10.3)
CHLORIDE SERPL-SCNC: 106 MMOL/L (ref 96–106)
CO2 SERPL-SCNC: 27 MMOL/L (ref 20–29)
CREAT SERPL-MCNC: 0.82 MG/DL (ref 0.57–1)
EOSINOPHIL # BLD AUTO: 0 X10E3/UL (ref 0–0.4)
EOSINOPHIL NFR BLD AUTO: 0 %
ERYTHROCYTE [DISTWIDTH] IN BLOOD BY AUTOMATED COUNT: 13.4 % (ref 11.7–15.4)
GLOBULIN SER CALC-MCNC: 1.7 G/DL (ref 1.5–4.5)
GLUCOSE SERPL-MCNC: 158 MG/DL (ref 65–99)
HCT VFR BLD AUTO: 32.9 % (ref 34–46.6)
HGB BLD-MCNC: 11.1 G/DL (ref 11.1–15.9)
IMM GRANULOCYTES # BLD AUTO: 0 X10E3/UL (ref 0–0.1)
IMM GRANULOCYTES NFR BLD AUTO: 0 %
INTERPRETATION: 480488: POSITIVE
LAB DIRECTOR NAME PROVIDER: NORMAL
LYMPHOCYTES # BLD AUTO: 0.7 X10E3/UL (ref 0.7–3.1)
LYMPHOCYTES NFR BLD AUTO: 7 %
MCH RBC QN AUTO: 32.5 PG (ref 26.6–33)
MCHC RBC AUTO-ENTMCNC: 33.7 G/DL (ref 31.5–35.7)
MCV RBC AUTO: 96 FL (ref 79–97)
MONOCYTES # BLD AUTO: 0.3 X10E3/UL (ref 0.1–0.9)
MONOCYTES NFR BLD AUTO: 4 %
NEUTROPHILS # BLD AUTO: 7.9 X10E3/UL (ref 1.4–7)
NEUTROPHILS NFR BLD AUTO: 89 %
PLATELET # BLD AUTO: 240 X10E3/UL (ref 150–450)
POTASSIUM SERPL-SCNC: 4.3 MMOL/L (ref 3.5–5.2)
PROT SERPL-MCNC: 6.2 G/DL (ref 6–8.5)
RBC # BLD AUTO: 3.42 X10E6/UL (ref 3.77–5.28)
REF LAB TEST METHOD: NORMAL
SODIUM SERPL-SCNC: 146 MMOL/L (ref 134–144)
T(ABL1,BCR)B2A2/CONTROL BLD/T: 0.15 %
T(ABL1,BCR)B3A2/CONTROL BLD/T: 1.41 %
T(ABL1,BCR)E1A2/CONTROL BLD/T: NORMAL %
WBC # BLD AUTO: 8.9 X10E3/UL (ref 3.4–10.8)

## 2021-08-30 NOTE — PROGRESS NOTES
[address] [facility] at [facility]  [phone]    Hematology / Oncology Established Visit    Reason for Visit:   Suri Das is a 80 y.o. female who is seen for follow up of CML. PCP is Dr. Julienne Holliday.     Hematology Oncology Treatment History:     Diagnosis: Chronic myelogenous leukemia (CML)    Stage: N/A    Pathology:   - 8/1/20 Peripheral blood BCR-ABL1 FISH: 84% of nuclei positive for BCR/ABL1 fusion    -8/28/20 Bone marrow biopsy: Chronic myeloid leukemia, BCR-ABL1-positive, chronic phase   The bone marrow biopsy is 90% cellular and adequate for evaluation. The myeloid series appears hyperplastic and left-shifted with increased proportion of promyelocytes and myelocytes. Blasts do not appear increased. The erythroid series is reduced in number but progresses through the full maturation sequence without significant dysplasia. Megakaryocytes are increased in number with dwarf forms seen. Plasma cells and lymphocytes do not appear increased in number.   -Cytogenetics: Karyotype: 46,XX,t(9;22)(q34.1;q11.2)[20]; Interpretation: ABNORMAL FEMALE KARYOTYPE   The t(9;22)(q34.1;q11.2), resulting in formation of the [place] chromosome, is observed in 90-95% of cases with chronic myeloid leukemia, BCR-ABL1 positive. It is also observed in acute lymphoblastic leukemia/lymphoma and mixed phenotype acute leukemia (MPAL) with t(9;22)(q34.1;q11.2);BCR-ABL1 and in 1-2% of AML. These results should be nterpreted in the context of clinical and histopathologic findings. .    Prior Treatment: Dasatinib 100mg PO daily, started 9/29/2020 - mid Oct 2020 (discontinued for mood changes, fluid retention). Current Treatment: Gleevec 200mg PO daily 3/19/21 (400mg dose caused rash)    History of Present Illness:   Suri Das is a 80 y.o. female with HTN, Hyperlipidemia, allergic rhinitis, GERD comes in for follow up of CML. Per PCP notes, pt has had fatigue for past 4 months.  Recently was treated with UTI in late June 2020 treated with Amoxicillin. She also developed a rash in late June, which was treated steroid ointment, but no oral Prednisone. Dermatologist felt this was a heat rash. She does endorse yeast type rash under breast or in groin which comes and goes, not new. No unintentional weight loss, good appetite. No fevers, chills, night sweats. Had a recent chest CT which was negative for PE. Back in Feb 2020, she felt severely exhausted for 1 week, and the recovered. She wondered if she had COVID19 virus. Outside labs:   1/21/20: WBC 10.6, Hgb 13.8, MCV 92,   7/20/20: WBC 20.5, Hgb 13, MCV 91,   7/27/20: WBC 17.3, Hgb 12.4, MCV 95,     After being on Dasatinib for 2 weeks, she states felt terrible - was angry at everyone, felt crazy, felt like having a persistent hot flash. When asked to elaborate on feeling crazy, she denies hallucinations or confusing, but does endorse mood problems while on the medication. She also had weight gain and leg swelling. She stopped Dasatinib at the last visit, started on Lasix daily. Gradually over the past 2 weeks, the leg swelling improved and she feels improved. Cardiac workup with EKG, echo negative. CXR did show pleural effusions and dopplers negative for DVT. Interval History:  Patient is seen for follow up up of CML, currently on Gleevec 200mg daily. No problems with rash, leg swelling or fluid retention. No significant diarrhea, but does take Imodium once a week for occasional loose stool. No nausea, fevers, chills or recent infections. PMHx: HTN  PSurgHx: Appendectomy, bilateral hip replacement, total hysterectomy for uterine prolapse in 2006 with pathology showing squamous metaplasia of cervix. SHx: Former smoker, No EtOH. . FHx: No blood disorders per patient. Review of Systems: A complete review of systems was obtained, negative except as described above.     Physical Exam:     Visit Vitals  BP (!) 161/69   Pulse 73   Temp 97.4 °F (36.3 °C)   Resp 16   Ht 5' 1\" (1.549 m)   Wt 146 lb 9.6 oz (66.5 kg)   SpO2 96%   BMI 27.70 kg/m²     ECOG PS: 1  General: Well developed, no acute distress  Eyes: Anicteric sclerae  HENT: Atraumatic, OP clear  Neck: Supple  Lymphatic: No cervical, supraclavicular, axillary adenopathy  Respiratory: CTAB, normal respiratory effort  CV: Normal rate, regular rhythm, no murmurs, 1+ pitting edema in BLE  GI: Soft, nontender, nondistended, no masses  MS: Normal gait and station. Digits without clubbing or cyanosis. Skin: No rashes, ecchymoses, or petechiae. Normal temperature, turgor, and texture. Neuro/Psych: Alert, oriented. Moving all 4 extremities. Appropriate affect, normal judgment/insight. Results:     Lab Results   Component Value Date/Time    WBC 8.9 08/09/2021 12:00 AM    HGB 11.1 08/09/2021 12:00 AM    HCT 32.9 (L) 08/09/2021 12:00 AM    PLATELET 145 90/40/3890 12:00 AM    MCV 96 08/09/2021 12:00 AM    ABS. NEUTROPHILS 7.9 (H) 08/09/2021 12:00 AM     Lab Results   Component Value Date/Time    Sodium 146 (H) 08/09/2021 12:00 AM    Potassium 4.3 08/09/2021 12:00 AM    Chloride 106 08/09/2021 12:00 AM    CO2 27 08/09/2021 12:00 AM    Glucose 158 (H) 08/09/2021 12:00 AM    BUN 13 08/09/2021 12:00 AM    Creatinine 0.82 08/09/2021 12:00 AM    GFR est AA 79 08/09/2021 12:00 AM    GFR est non-AA 68 08/09/2021 12:00 AM    Calcium 9.1 08/09/2021 12:00 AM     Lab Results   Component Value Date/Time    Bilirubin, total 0.2 08/09/2021 12:00 AM    ALT (SGPT) 21 08/09/2021 12:00 AM    Alk.  phosphatase 48 08/09/2021 12:00 AM    Protein, total 6.2 08/09/2021 12:00 AM    Albumin 4.5 08/09/2021 12:00 AM    Globulin 2.9 07/06/2021 10:40 AM     No results found for: IRON, FE, TIBC, IBCT, PSAT, FERR    No results found for: B12LT, FOL, RBCF  No results found for: TSH, TSH2, TSH3, TSHP, TSHEXT, TSHEXT  No results found for: HAMAT, HAAB, HABT, HAAT, HBSAG, HBSB, HBSAT, HBABN, HBCM, HBCAB, HBCAT, Markus Quintanilla, [address] [place] [address] Avenue, [address] [facility], [id number], [address] [facility], [facility], [address], [address] [place] [address], [id number], [id number], [address] [place] [address], [id number], HBCMLT, [id number], HCGAT     20: POSITIVE for the BCR-ABL1:  e13a2 (b2a2, p210) 30.77; e14a2 (b3a2, p210) 56.03; e1a2 (p190) 0.03 fusion transcripts. 21: POSITIVE for the BCR-ABL1:  e13a2 (b2a2, p210) 13.2; e14a2 (b3a2, p210) 34.4; e1a2 (p190) 0.0175 fusion transcripts. 21: POSITIVE for the BCR-ABL1:  e13a2 (b2a2, p210) 0.0614 and e14a2 (b3a2, p210) 0.4772 fusion transcripts. 21: POSITIVE for the BCR-ABL1:  e13a2 (b2a2, p210) 0.1481 and e14a2 (b3a2, p210) 1.4140 fusion transcripts. Imagin/28/20 abdomen ultrasound:  IMPRESSION: Hepatic steatosis. No evidence for hepatosplenomegaly    Assessment & Plan:   Alma Rosa Daniel is a 80 y.o. female is seen for evaluation of leukocytosis. 1. CML: Sokal and Euro score intermediate risk. Discussed with patient that this a slow growing leukemia and can be effectively treated with an oral medication in the tyrosine kinase inhibitor class of meds such as Imatinib, Dasatinib, etc. Side effects of these medications include nausea, diarrhea, constipation, rash, liver test abnormalities, most of which are generally troublesome in the first 1-2 months, but then can resolve or improve over time. Previously printed patient information on CML for patient. Good response to Gleevec overall, but mild increase in BCR-ABL1 transcripts from prior. -- Gleevec 200mg PO daily  -- Prednisone 10mg daily - pt does not wish to taper off  -- Labs (CBC, CMP) on 21 - will call with results  -- Labs (CBC, CMP, bcr-abl) on 21 - will review at next visit  -- Return on 11/15/21 for MD visit   -- Had both COVID vaccines. 2. SOB / Pleural effusions / Pulm edema / LE edema:  Imatinib can cause peripheral edema in up to 10% of cases. Cardiac workup was negative for ischemia. Echo showed EF 60-65%. Pt may have diastolic dysfunction.  Dr. Lara Castelan ordered Lexiscan nuclear stress test but pt has not completed. No longer on Lasix. 3. HTN:  Lisinopril/HCTZ 40-25mg daily. Managed by Dr. Vance Pascal. 4. Health maintenance: Last colonoscopy done 2016. Last mammogram 5/21/19.     5. H/o Erythematous rash: No pruritus. Was likely 2/2 Dasatinib and improved off of treatment. I appreciate the opportunity to participate in Ms. Milind Darling care.     CC: Dr. Patricia Estrada      Signed By: Esha Navas MD     September 3, 2021

## 2021-09-03 ENCOUNTER — OFFICE VISIT (OUTPATIENT)
Dept: ONCOLOGY | Age: 81
End: 2021-09-03
Payer: MEDICARE

## 2021-09-03 VITALS
SYSTOLIC BLOOD PRESSURE: 161 MMHG | DIASTOLIC BLOOD PRESSURE: 69 MMHG | WEIGHT: 146.6 LBS | HEIGHT: 61 IN | OXYGEN SATURATION: 96 % | BODY MASS INDEX: 27.68 KG/M2 | TEMPERATURE: 97.4 F | RESPIRATION RATE: 16 BRPM | HEART RATE: 73 BPM

## 2021-09-03 DIAGNOSIS — Z87.2 HISTORY OF DRUG RASH: ICD-10-CM

## 2021-09-03 DIAGNOSIS — C92.10 CML (CHRONIC MYELOCYTIC LEUKEMIA) (HCC): Primary | ICD-10-CM

## 2021-09-03 DIAGNOSIS — I10 ESSENTIAL HYPERTENSION: ICD-10-CM

## 2021-09-03 DIAGNOSIS — Z51.11 CHEMOTHERAPY MANAGEMENT, ENCOUNTER FOR: ICD-10-CM

## 2021-09-03 PROCEDURE — 1090F PRES/ABSN URINE INCON ASSESS: CPT | Performed by: INTERNAL MEDICINE

## 2021-09-03 PROCEDURE — G8400 PT W/DXA NO RESULTS DOC: HCPCS | Performed by: INTERNAL MEDICINE

## 2021-09-03 PROCEDURE — G8427 DOCREV CUR MEDS BY ELIG CLIN: HCPCS | Performed by: INTERNAL MEDICINE

## 2021-09-03 PROCEDURE — G8536 NO DOC ELDER MAL SCRN: HCPCS | Performed by: INTERNAL MEDICINE

## 2021-09-03 PROCEDURE — 1101F PT FALLS ASSESS-DOCD LE1/YR: CPT | Performed by: INTERNAL MEDICINE

## 2021-09-03 PROCEDURE — G8419 CALC BMI OUT NRM PARAM NOF/U: HCPCS | Performed by: INTERNAL MEDICINE

## 2021-09-03 PROCEDURE — G8754 DIAS BP LESS 90: HCPCS | Performed by: INTERNAL MEDICINE

## 2021-09-03 PROCEDURE — 99215 OFFICE O/P EST HI 40 MIN: CPT | Performed by: INTERNAL MEDICINE

## 2021-09-03 PROCEDURE — G8510 SCR DEP NEG, NO PLAN REQD: HCPCS | Performed by: INTERNAL MEDICINE

## 2021-09-03 PROCEDURE — G8753 SYS BP > OR = 140: HCPCS | Performed by: INTERNAL MEDICINE

## 2021-09-03 NOTE — LETTER
9/3/2021    Patient: Donna Benitez   YOB: 1940   Date of Visit: 9/3/2021     Antonella Pritchard MD  [address] [facility]  [address] [place] [address]  Via Fax: [phone]    Dear Antonella Pritchard MD,      Thank you for referring Ms. Donna Benitez to [address] [place] [address] for evaluation. My notes for this consultation are attached. If you have questions, please do not hesitate to call me. I look forward to following your patient along with you.       Sincerely,    Amanda Mario MD

## 2021-09-03 NOTE — PROGRESS NOTES
Chief Complaint   Patient presents with    Follow-up     Maria Teresa Zuñiga is a pleasant 80year old woman who presents as a follow up for CML.  She denies pain

## 2021-09-21 LAB
ALBUMIN SERPL-MCNC: 4.5 G/DL (ref 3.7–4.7)
ALBUMIN/GLOB SERPL: 2.1 {RATIO} (ref 1.2–2.2)
ALP SERPL-CCNC: 45 IU/L (ref 44–121)
ALT SERPL-CCNC: 19 IU/L (ref 0–32)
AST SERPL-CCNC: 16 IU/L (ref 0–40)
BASOPHILS # BLD AUTO: 0 X10E3/UL (ref 0–0.2)
BASOPHILS NFR BLD AUTO: 0 %
BILIRUB SERPL-MCNC: 0.2 MG/DL (ref 0–1.2)
BUN SERPL-MCNC: 23 MG/DL (ref 8–27)
BUN/CREAT SERPL: 30 (ref 12–28)
CALCIUM SERPL-MCNC: 9.9 MG/DL (ref 8.7–10.3)
CHLORIDE SERPL-SCNC: 102 MMOL/L (ref 96–106)
CO2 SERPL-SCNC: 25 MMOL/L (ref 20–29)
CREAT SERPL-MCNC: 0.77 MG/DL (ref 0.57–1)
EOSINOPHIL # BLD AUTO: 0 X10E3/UL (ref 0–0.4)
EOSINOPHIL NFR BLD AUTO: 0 %
ERYTHROCYTE [DISTWIDTH] IN BLOOD BY AUTOMATED COUNT: 12.8 % (ref 11.7–15.4)
GLOBULIN SER CALC-MCNC: 2.1 G/DL (ref 1.5–4.5)
GLUCOSE SERPL-MCNC: 178 MG/DL (ref 65–99)
HCT VFR BLD AUTO: 34.5 % (ref 34–46.6)
HGB BLD-MCNC: 11.3 G/DL (ref 11.1–15.9)
IMM GRANULOCYTES # BLD AUTO: 0 X10E3/UL (ref 0–0.1)
IMM GRANULOCYTES NFR BLD AUTO: 0 %
LYMPHOCYTES # BLD AUTO: 0.9 X10E3/UL (ref 0.7–3.1)
LYMPHOCYTES NFR BLD AUTO: 10 %
MCH RBC QN AUTO: 31.9 PG (ref 26.6–33)
MCHC RBC AUTO-ENTMCNC: 32.8 G/DL (ref 31.5–35.7)
MCV RBC AUTO: 98 FL (ref 79–97)
MONOCYTES # BLD AUTO: 0.5 X10E3/UL (ref 0.1–0.9)
MONOCYTES NFR BLD AUTO: 5 %
NEUTROPHILS # BLD AUTO: 7.9 X10E3/UL (ref 1.4–7)
NEUTROPHILS NFR BLD AUTO: 85 %
PLATELET # BLD AUTO: 212 X10E3/UL (ref 150–450)
POTASSIUM SERPL-SCNC: 4.3 MMOL/L (ref 3.5–5.2)
PROT SERPL-MCNC: 6.6 G/DL (ref 6–8.5)
RBC # BLD AUTO: 3.54 X10E6/UL (ref 3.77–5.28)
SODIUM SERPL-SCNC: 142 MMOL/L (ref 134–144)
WBC # BLD AUTO: 9.3 X10E3/UL (ref 3.4–10.8)

## 2021-11-02 DIAGNOSIS — C92.10 CML (CHRONIC MYELOCYTIC LEUKEMIA) (HCC): ICD-10-CM

## 2021-11-02 RX ORDER — IMATINIB MESYLATE 100 MG/1
TABLET, FILM COATED ORAL
Qty: 60 TABLET | Refills: 3 | Status: SHIPPED | OUTPATIENT
Start: 2021-11-02 | End: 2022-03-01 | Stop reason: SDUPTHER

## 2021-11-02 NOTE — TELEPHONE ENCOUNTER
[facility] Encounter    11/2/21- E-mail received from [address] [place] [address] requesting a new rx for this patient's imatinib mesylate 100 mg tablet. Her next refill is due on 11/11/2021.

## 2021-11-08 LAB
ALBUMIN SERPL-MCNC: 4.8 G/DL (ref 3.7–4.7)
ALBUMIN/GLOB SERPL: 2.5 {RATIO} (ref 1.2–2.2)
ALP SERPL-CCNC: 52 IU/L (ref 44–121)
ALT SERPL-CCNC: 19 IU/L (ref 0–32)
AST SERPL-CCNC: 25 IU/L (ref 0–40)
BACKGROUND: 480503: NORMAL
BASOPHILS # BLD AUTO: 0 X10E3/UL (ref 0–0.2)
BASOPHILS NFR BLD AUTO: 1 %
BILIRUB SERPL-MCNC: 0.3 MG/DL (ref 0–1.2)
BUN SERPL-MCNC: 12 MG/DL (ref 8–27)
BUN/CREAT SERPL: 20 (ref 12–28)
CALCIUM SERPL-MCNC: 9.9 MG/DL (ref 8.7–10.3)
CHLORIDE SERPL-SCNC: 104 MMOL/L (ref 96–106)
CO2 SERPL-SCNC: 26 MMOL/L (ref 20–29)
CREAT SERPL-MCNC: 0.61 MG/DL (ref 0.57–1)
EOSINOPHIL # BLD AUTO: 0.1 X10E3/UL (ref 0–0.4)
EOSINOPHIL NFR BLD AUTO: 2 %
ERYTHROCYTE [DISTWIDTH] IN BLOOD BY AUTOMATED COUNT: 12.9 % (ref 11.7–15.4)
GLOBULIN SER CALC-MCNC: 1.9 G/DL (ref 1.5–4.5)
GLUCOSE SERPL-MCNC: 89 MG/DL (ref 65–99)
HCT VFR BLD AUTO: 34.3 % (ref 34–46.6)
HGB BLD-MCNC: 11.4 G/DL (ref 11.1–15.9)
IMM GRANULOCYTES # BLD AUTO: 0 X10E3/UL (ref 0–0.1)
IMM GRANULOCYTES NFR BLD AUTO: 0 %
INTERPRETATION: 480488: POSITIVE
LAB DIRECTOR NAME PROVIDER: NORMAL
LYMPHOCYTES # BLD AUTO: 1.7 X10E3/UL (ref 0.7–3.1)
LYMPHOCYTES NFR BLD AUTO: 29 %
MCH RBC QN AUTO: 31.8 PG (ref 26.6–33)
MCHC RBC AUTO-ENTMCNC: 33.2 G/DL (ref 31.5–35.7)
MCV RBC AUTO: 96 FL (ref 79–97)
MONOCYTES # BLD AUTO: 0.9 X10E3/UL (ref 0.1–0.9)
MONOCYTES NFR BLD AUTO: 16 %
NEUTROPHILS # BLD AUTO: 3.1 X10E3/UL (ref 1.4–7)
NEUTROPHILS NFR BLD AUTO: 52 %
PLATELET # BLD AUTO: 300 X10E3/UL (ref 150–450)
POTASSIUM SERPL-SCNC: 4.2 MMOL/L (ref 3.5–5.2)
PROT SERPL-MCNC: 6.7 G/DL (ref 6–8.5)
RBC # BLD AUTO: 3.58 X10E6/UL (ref 3.77–5.28)
REF LAB TEST METHOD: NORMAL
SODIUM SERPL-SCNC: 144 MMOL/L (ref 134–144)
T(ABL1,BCR)B2A2/CONTROL BLD/T: 0.04 %
T(ABL1,BCR)B3A2/CONTROL BLD/T: 0.34 %
T(ABL1,BCR)E1A2/CONTROL BLD/T: NORMAL %
WBC # BLD AUTO: 5.9 X10E3/UL (ref 3.4–10.8)

## 2021-11-30 ENCOUNTER — OFFICE VISIT (OUTPATIENT)
Dept: ONCOLOGY | Age: 81
End: 2021-11-30
Payer: MEDICARE

## 2021-11-30 VITALS
OXYGEN SATURATION: 96 % | HEIGHT: 61 IN | TEMPERATURE: 96.5 F | BODY MASS INDEX: 28.36 KG/M2 | DIASTOLIC BLOOD PRESSURE: 77 MMHG | SYSTOLIC BLOOD PRESSURE: 151 MMHG | HEART RATE: 69 BPM | WEIGHT: 150.2 LBS

## 2021-11-30 DIAGNOSIS — C92.10 CML (CHRONIC MYELOCYTIC LEUKEMIA) (HCC): Primary | ICD-10-CM

## 2021-11-30 DIAGNOSIS — Z51.11 CHEMOTHERAPY MANAGEMENT, ENCOUNTER FOR: ICD-10-CM

## 2021-11-30 DIAGNOSIS — I10 ESSENTIAL HYPERTENSION: ICD-10-CM

## 2021-11-30 DIAGNOSIS — R60.0 BILATERAL LOWER EXTREMITY EDEMA: ICD-10-CM

## 2021-11-30 PROCEDURE — 99215 OFFICE O/P EST HI 40 MIN: CPT | Performed by: INTERNAL MEDICINE

## 2021-11-30 PROCEDURE — 1090F PRES/ABSN URINE INCON ASSESS: CPT | Performed by: INTERNAL MEDICINE

## 2021-11-30 PROCEDURE — G8400 PT W/DXA NO RESULTS DOC: HCPCS | Performed by: INTERNAL MEDICINE

## 2021-11-30 PROCEDURE — 1101F PT FALLS ASSESS-DOCD LE1/YR: CPT | Performed by: INTERNAL MEDICINE

## 2021-11-30 PROCEDURE — G8753 SYS BP > OR = 140: HCPCS | Performed by: INTERNAL MEDICINE

## 2021-11-30 PROCEDURE — G8536 NO DOC ELDER MAL SCRN: HCPCS | Performed by: INTERNAL MEDICINE

## 2021-11-30 PROCEDURE — G8432 DEP SCR NOT DOC, RNG: HCPCS | Performed by: INTERNAL MEDICINE

## 2021-11-30 PROCEDURE — G8419 CALC BMI OUT NRM PARAM NOF/U: HCPCS | Performed by: INTERNAL MEDICINE

## 2021-11-30 PROCEDURE — G8754 DIAS BP LESS 90: HCPCS | Performed by: INTERNAL MEDICINE

## 2021-11-30 PROCEDURE — G8427 DOCREV CUR MEDS BY ELIG CLIN: HCPCS | Performed by: INTERNAL MEDICINE

## 2021-11-30 NOTE — PROGRESS NOTES
Gregory Nelson is a 80 y.o. female here for follow up of CML. Patient with complaints of ongoing left leg pain, rates as an 8 out of 10.

## 2021-11-30 NOTE — PROGRESS NOTES
[address] [facility] Oncology at [address] [place] [address]  [phone]    Hematology / Oncology Established Visit    Reason for Visit:   Nayely Milton is a 80 y.o. female who is seen for follow up of CML. PCP is Dr. Ashli Allen.     Hematology Oncology Treatment History:     Diagnosis: Chronic myelogenous leukemia (CML)    Stage: N/A    Pathology:   - 8/1/20 Peripheral blood BCR-ABL1 FISH: 84% of nuclei positive for BCR/ABL1 fusion    -8/28/20 Bone marrow biopsy: Chronic myeloid leukemia, BCR-ABL1-positive, chronic phase   The bone marrow biopsy is 90% cellular and adequate for evaluation. The myeloid series appears hyperplastic and left-shifted with increased proportion of promyelocytes and myelocytes. Blasts do not appear increased. The erythroid series is reduced in number but progresses through the full maturation sequence without significant dysplasia. Megakaryocytes are increased in number with dwarf forms seen. Plasma cells and lymphocytes do not appear increased in number.   -Cytogenetics: Karyotype: 46,XX,t(9;22)(q34.1;q11.2)[20]; Interpretation: ABNORMAL FEMALE KARYOTYPE   The t(9;22)(q34.1;q11.2), resulting in formation of the [place] chromosome, is observed in 90-95% of cases with chronic myeloid leukemia, BCR-ABL1 positive. It is also observed in acute lymphoblastic leukemia/lymphoma and mixed phenotype acute leukemia (MPAL) with t(9;22)(q34.1;q11.2);BCR-ABL1 and in 1-2% of AML. These results should be nterpreted in the context of clinical and histopathologic findings. .    Prior Treatment: Dasatinib 100mg PO daily, started 9/29/2020 - mid Oct 2020 (discontinued for mood changes, fluid retention). Current Treatment: Gleevec 200mg PO daily 3/19/21 (400mg dose caused rash)    History of Present Illness:   Nayely Milton is a 80 y.o. female with HTN, Hyperlipidemia, allergic rhinitis, GERD comes in for follow up of CML. Per PCP notes, pt has had fatigue for past 4 months.  Recently was treated with UTI in late June 2020 treated with Amoxicillin. She also developed a rash in late June, which was treated steroid ointment, but no oral Prednisone. Dermatologist felt this was a heat rash. She does endorse yeast type rash under breast or in groin which comes and goes, not new. No unintentional weight loss, good appetite. No fevers, chills, night sweats. Had a recent chest CT which was negative for PE. Back in Feb 2020, she felt severely exhausted for 1 week, and the recovered. She wondered if she had COVID19 virus. Outside labs:   1/21/20: WBC 10.6, Hgb 13.8, MCV 92,   7/20/20: WBC 20.5, Hgb 13, MCV 91,   7/27/20: WBC 17.3, Hgb 12.4, MCV 95,     After being on Dasatinib for 2 weeks, she states felt terrible - was angry at everyone, felt crazy, felt like having a persistent hot flash. When asked to elaborate on feeling crazy, she denies hallucinations or confusing, but does endorse mood problems while on the medication. She also had weight gain and leg swelling. She stopped Dasatinib at the last visit, started on Lasix daily. Gradually over the past 2 weeks, the leg swelling improved and she feels improved. Cardiac workup with EKG, echo negative. CXR did show pleural effusions and dopplers negative for DVT. Interval History:  Patient is seen for follow up up of CML, currently on Gleevec 200mg daily. Pt took a 1 week break from [address] due to leg swelling. The swelling improved slightly off of Gleevec. Also noted a nonpruritic rash on her face. No n/v/d. She then restarted Gleevec at 100mg PO daily without any worsening of the rash. Pt had stopped taking Prednisone 10mg/d a few months ago of her own accord. She states she feels better with current dose of Gleevec and wants to continue this since she feels less sluggish.        PMHx: HTN  PSurgHx: Appendectomy, bilateral hip replacement, total hysterectomy for uterine prolapse in 2006 with pathology showing squamous metaplasia of cervix. SHx: Former smoker, No EtOH. . FHx: No blood disorders per patient. Review of Systems: A complete review of systems was obtained, negative except as described above. Physical Exam:     Visit Vitals  BP (!) 151/77 (BP 1 Location: Left arm, BP Patient Position: Sitting)   Pulse 69   Temp (!) 96.5 °F (35.8 °C) (Temporal)   Ht 5' 1\" (1.549 m)   Wt 150 lb 3.2 oz (68.1 kg)   SpO2 96%   BMI 28.38 kg/m²     ECOG PS: 1  General: Well developed, no acute distress  Eyes: Anicteric sclerae  HENT: Atraumatic, OP clear  Neck: Supple  Lymphatic: No cervical, supraclavicular adenopathy  Respiratory: CTAB, normal respiratory effort  CV: Normal rate, regular rhythm, no murmurs, 1-2+ pitting edema in bilat ankles  GI: Soft, nontender, nondistended, no masses  MS: Normal gait and station. Digits without clubbing or cyanosis. Skin: Mild scattered erythematous lesions on face  Neuro/Psych: Alert, oriented. Moving all 4 extremities. Appropriate affect, normal judgment/insight. Results:     Lab Results   Component Value Date/Time    WBC 5.9 11/01/2021 12:00 AM    HGB 11.4 11/01/2021 12:00 AM    HCT 34.3 11/01/2021 12:00 AM    PLATELET 775 65/68/4884 12:00 AM    MCV 96 11/01/2021 12:00 AM    ABS. NEUTROPHILS 3.1 11/01/2021 12:00 AM     Lab Results   Component Value Date/Time    Sodium 144 11/01/2021 12:00 AM    Potassium 4.2 11/01/2021 12:00 AM    Chloride 104 11/01/2021 12:00 AM    CO2 26 11/01/2021 12:00 AM    Glucose 89 11/01/2021 12:00 AM    BUN 12 11/01/2021 12:00 AM    Creatinine 0.61 11/01/2021 12:00 AM    GFR est  11/01/2021 12:00 AM    GFR est non-AA 87 11/01/2021 12:00 AM    Calcium 9.9 11/01/2021 12:00 AM     Lab Results   Component Value Date/Time    Bilirubin, total 0.3 11/01/2021 12:00 AM    ALT (SGPT) 19 11/01/2021 12:00 AM    Alk.  phosphatase 52 11/01/2021 12:00 AM    Protein, total 6.7 11/01/2021 12:00 AM    Albumin 4.8 (H) 11/01/2021 12:00 AM    Globulin 2.9 07/06/2021 10:40 AM     No results found for: IRON, FE, TIBC, IBCT, PSAT, FERR    No results found for: B12LT, FOL, RBCF  No results found for: TSH, TSH2, TSH3, TSHP, TSHEXT, TSHEXT  No results found for: HAMAT, HAAB, HABT, HAAT, HBSAG, HBSB, HBSAT, HBABN, HBCM, HBCAB, HBCAT, XBCABS, HBEAB, HBEAG, XHEPCS, [id number], [address] [facility], HBCMLT, HBCLT, [facility], [id number], [id number], [address] [place] [address], [id number], HBCMLT, [id number], HCGAT     20: POSITIVE for the BCR-ABL1:  e13a2 (b2a2, p210) 30.77; e14a2 (b3a2, p210) 56.03; e1a2 (p190) 0.03 fusion transcripts. 21: POSITIVE for the BCR-ABL1:  e13a2 (b2a2, p210) 13.2; e14a2 (b3a2, p210) 34.4; e1a2 (p190) 0.0175 fusion transcripts. 21: POSITIVE for the BCR-ABL1:  e13a2 (b2a2, p210) 0.0614 and e14a2 (b3a2, p210) 0.4772 fusion transcripts. 21: POSITIVE for the BCR-ABL1:  e13a2 (b2a2, p210) 0.1481 and e14a2 (b3a2, p210) 1.4140 fusion transcripts. 21: POSITIVE for the BCR-ABL1:  e13a2 (b2a2, p210) 0.0381 and e14a2 (b3a2, p210) 0.3430 fusion transcripts. Imagin/28/20 abdomen ultrasound:  IMPRESSION: Hepatic steatosis. No evidence for hepatosplenomegaly    Assessment & Plan:   Johny Marquez is a 80 y.o. female is seen for evaluation of leukocytosis. 1. CML: Sokal and Euro score intermediate risk. Discussed with patient that this a slow growing leukemia and can be effectively treated with an oral medication in the tyrosine kinase inhibitor class of meds such as Imatinib, Dasatinib, etc. Side effects of these medications include nausea, diarrhea, constipation, rash, liver test abnormalities, most of which are generally troublesome in the first 1-2 months, but then can resolve or improve over time. Previously printed patient information on CML for patient. Good response to Gleevec overall with continued decrease in BCR-ABL1 transcripts from prior.   Pt wishes to decrease dose of Gleevec to 100mg - discussed risks of losing disease control balanced against decreased side effects. Pt voiced understanding and still wants to continue decreased dose. -- Okay to continue [address] at 100mg (dose reduction from prior 200mg/d) per patient request.   -- Prednisone 10mg daily - pt does not wish to taper off  -- Labs (CBC, CMP, bcr-abl) on 2/1/21 - will review at next visit  -- If rash worsens, will continue low dose topical steroid first prior to restarting Pred 10mg/d  -- Return in 12 weeks  -- Had both COVID vaccines. 2. SOB / Pleural effusions / Pulm edema / LE edema:  Imatinib can cause peripheral edema in up to 10% of cases. Cardiac workup was negative for ischemia. Prior echo showed EF 60-65% and pt may have diastolic dysfunction. Dr. Yves Guerrero ordered [facility] nuclear stress test but pt has not completed. No longer on Lasix. 3. HTN:  Lisinopril/HCTZ 40-25mg daily. Managed by Dr. Yves Guerrero. 4. Health maintenance: Last colonoscopy done 2016. Last mammogram 5/21/19.     5. H/o Erythematous rash: No pruritus. Was likely 2/2 Dasatinib and improved off of treatment. I appreciate the opportunity to participate in Ms. Bria Vega elvin.     CC: Dr. Lam Upton      Signed By: Guido Mullins MD     November 30, 2021

## 2022-02-06 LAB
ALBUMIN SERPL-MCNC: 4.7 G/DL (ref 3.7–4.7)
ALBUMIN/GLOB SERPL: 2 {RATIO} (ref 1.2–2.2)
ALP SERPL-CCNC: 56 IU/L (ref 44–121)
ALT SERPL-CCNC: 25 IU/L (ref 0–32)
AST SERPL-CCNC: 22 IU/L (ref 0–40)
BACKGROUND: 480503: NORMAL
BASOPHILS # BLD AUTO: 0.1 X10E3/UL (ref 0–0.2)
BASOPHILS NFR BLD AUTO: 1 %
BILIRUB SERPL-MCNC: 0.2 MG/DL (ref 0–1.2)
BUN SERPL-MCNC: 14 MG/DL (ref 8–27)
BUN/CREAT SERPL: 21 (ref 12–28)
CALCIUM SERPL-MCNC: 9.6 MG/DL (ref 8.7–10.3)
CHLORIDE SERPL-SCNC: 102 MMOL/L (ref 96–106)
CO2 SERPL-SCNC: 27 MMOL/L (ref 20–29)
CREAT SERPL-MCNC: 0.67 MG/DL (ref 0.57–1)
EOSINOPHIL # BLD AUTO: 0.1 X10E3/UL (ref 0–0.4)
EOSINOPHIL NFR BLD AUTO: 2 %
ERYTHROCYTE [DISTWIDTH] IN BLOOD BY AUTOMATED COUNT: 12.2 % (ref 11.7–15.4)
GLOBULIN SER CALC-MCNC: 2.3 G/DL (ref 1.5–4.5)
GLUCOSE SERPL-MCNC: 121 MG/DL (ref 65–99)
HCT VFR BLD AUTO: 38.5 % (ref 34–46.6)
HGB BLD-MCNC: 12.3 G/DL (ref 11.1–15.9)
IMM GRANULOCYTES # BLD AUTO: 0 X10E3/UL (ref 0–0.1)
IMM GRANULOCYTES NFR BLD AUTO: 0 %
INTERPRETATION: 480488: POSITIVE
LAB DIRECTOR NAME PROVIDER: NORMAL
LYMPHOCYTES # BLD AUTO: 1.5 X10E3/UL (ref 0.7–3.1)
LYMPHOCYTES NFR BLD AUTO: 21 %
MCH RBC QN AUTO: 29.8 PG (ref 26.6–33)
MCHC RBC AUTO-ENTMCNC: 31.9 G/DL (ref 31.5–35.7)
MCV RBC AUTO: 93 FL (ref 79–97)
MONOCYTES # BLD AUTO: 0.8 X10E3/UL (ref 0.1–0.9)
MONOCYTES NFR BLD AUTO: 11 %
NEUTROPHILS # BLD AUTO: 4.7 X10E3/UL (ref 1.4–7)
NEUTROPHILS NFR BLD AUTO: 65 %
PLATELET # BLD AUTO: 245 X10E3/UL (ref 150–450)
POTASSIUM SERPL-SCNC: 4.2 MMOL/L (ref 3.5–5.2)
PROT SERPL-MCNC: 7 G/DL (ref 6–8.5)
RBC # BLD AUTO: 4.13 X10E6/UL (ref 3.77–5.28)
REF LAB TEST METHOD: NORMAL
SODIUM SERPL-SCNC: 140 MMOL/L (ref 134–144)
T(ABL1,BCR)B2A2/CONTROL BLD/T: 0.17 %
T(ABL1,BCR)B3A2/CONTROL BLD/T: 5.33 %
T(ABL1,BCR)E1A2/CONTROL BLD/T: NORMAL %
WBC # BLD AUTO: 7.2 X10E3/UL (ref 3.4–10.8)

## 2022-02-07 NOTE — PROGRESS NOTES
[address] [facility] Oncology at [facility]  [phone]    Hematology / Oncology Established Visit    Reason for Visit:   Vladimir Escamilla is a 80 y.o. female who is seen for follow up of CML. PCP is Dr. Indira Lucio.     Hematology Oncology Treatment History:     Diagnosis: Chronic myelogenous leukemia (CML)    Stage: N/A    Pathology:   - 8/1/20 Peripheral blood BCR-ABL1 FISH: 84% of nuclei positive for BCR/ABL1 fusion    -8/28/20 Bone marrow biopsy: Chronic myeloid leukemia, BCR-ABL1-positive, chronic phase   The bone marrow biopsy is 90% cellular and adequate for evaluation. The myeloid series appears hyperplastic and left-shifted with increased proportion of promyelocytes and myelocytes. Blasts do not appear increased. The erythroid series is reduced in number but progresses through the full maturation sequence without significant dysplasia. Megakaryocytes are increased in number with dwarf forms seen. Plasma cells and lymphocytes do not appear increased in number.   -Cytogenetics: Karyotype: 46,XX,t(9;22)(q34.1;q11.2)[20]; Interpretation: ABNORMAL FEMALE KARYOTYPE   The t(9;22)(q34.1;q11.2), resulting in formation of the [place] chromosome, is observed in 90-95% of cases with chronic myeloid leukemia, BCR-ABL1 positive. It is also observed in acute lymphoblastic leukemia/lymphoma and mixed phenotype acute leukemia (MPAL) with t(9;22)(q34.1;q11.2);BCR-ABL1 and in 1-2% of AML. These results should be nterpreted in the context of clinical and histopathologic findings. .    Prior Treatment: Dasatinib 100mg PO daily, started 9/29/2020 - mid Oct 2020 (discontinued for mood changes, fluid retention). Current Treatment: Gleevec 200mg PO daily     History of Present Illness:   Vladimir Escamilla is a 80 y.o. female with HTN, Hyperlipidemia, allergic rhinitis, GERD comes in for follow up of CML. Per PCP notes, pt has had fatigue for past 4 months.  Recently was treated with UTI in late June 2020 treated with Amoxicillin. She also developed a rash in late June, which was treated steroid ointment, but no oral Prednisone. Dermatologist felt this was a heat rash. She does endorse yeast type rash under breast or in groin which comes and goes, not new. No unintentional weight loss, good appetite. No fevers, chills, night sweats. Had a recent chest CT which was negative for PE. Back in Feb 2020, she felt severely exhausted for 1 week, and the recovered. She wondered if she had COVID19 virus. Outside labs:   1/21/20: WBC 10.6, Hgb 13.8, MCV 92,   7/20/20: WBC 20.5, Hgb 13, MCV 91,   7/27/20: WBC 17.3, Hgb 12.4, MCV 95,     After being on Dasatinib for 2 weeks, she states felt terrible - was angry at everyone, felt crazy, felt like having a persistent hot flash. When asked to elaborate on feeling crazy, she denies hallucinations or confusing, but does endorse mood problems while on the medication. She also had weight gain and leg swelling. She stopped Dasatinib at the last visit, started on Lasix daily. Gradually over the past 2 weeks, the leg swelling improved and she feels improved. Cardiac workup with EKG, echo negative. CXR did show pleural effusions and dopplers negative for DVT. Interval History:  Patient is seen for follow up up of CML, currently on Gleevec 200mg daily. No significant rash aside from mild nonpruritic erythematous lesions on nose and face. No n/v/d. She had taken Gleevec 100mg/d for 2.5 months and then 200mg for the last 3 weeks. No significant changes since increasing dose per patient. Does have mild fatigue. No recent infections. PMHx: HTN  PSurgHx: Appendectomy, bilateral hip replacement, total hysterectomy for uterine prolapse in 2006 with pathology showing squamous metaplasia of cervix. SHx: Former smoker, No EtOH. . FHx: No blood disorders per patient.      Review of Systems: A complete review of systems was obtained, negative except as described above. Physical Exam:     Visit Vitals  BP (!) 186/72   Pulse 68   Resp 16   Ht 5' 1\" (1.549 m)   Wt 148 lb 14.4 oz (67.5 kg)   SpO2 95%   BMI 28.13 kg/m²     ECOG PS: 1  General: Well developed, no acute distress  Neck: Supple  Lymphatic: No cervical, supraclavicular adenopathy  Respiratory: CTAB, normal respiratory effort  CV: Normal rate, regular rhythm, no murmurs, 2+ pitting edema in bilat ankles  GI: Soft, nontender, nondistended, no masses  Skin: Mild scattered erythematous lesions on face  Neuro/Psych: Alert, oriented. Moving all 4 extremities. Appropriate affect, normal judgment/insight. Results:     Lab Results   Component Value Date/Time    WBC 7.2 01/28/2022 12:00 AM    HGB 12.3 01/28/2022 12:00 AM    HCT 38.5 01/28/2022 12:00 AM    PLATELET 222 96/74/3560 12:00 AM    MCV 93 01/28/2022 12:00 AM    ABS. NEUTROPHILS 4.7 01/28/2022 12:00 AM     Lab Results   Component Value Date/Time    Sodium 140 01/28/2022 12:00 AM    Potassium 4.2 01/28/2022 12:00 AM    Chloride 102 01/28/2022 12:00 AM    CO2 27 01/28/2022 12:00 AM    Glucose 121 (H) 01/28/2022 12:00 AM    BUN 14 01/28/2022 12:00 AM    Creatinine 0.67 01/28/2022 12:00 AM    GFR est AA 97 01/28/2022 12:00 AM    GFR est non-AA 84 01/28/2022 12:00 AM    Calcium 9.6 01/28/2022 12:00 AM     Lab Results   Component Value Date/Time    Bilirubin, total 0.2 01/28/2022 12:00 AM    ALT (SGPT) 25 01/28/2022 12:00 AM    Alk.  phosphatase 56 01/28/2022 12:00 AM    Protein, total 7.0 01/28/2022 12:00 AM    Albumin 4.7 01/28/2022 12:00 AM    Globulin 2.9 07/06/2021 10:40 AM     No results found for: IRON, FE, TIBC, IBCT, PSAT, FERR    No results found for: B12LT, FOL, RBCF  No results found for: TSH, TSH2, TSH3, TSHP, TSHEXT, TSHEXT  No results found for: HAMAT, HAAB, HABT, HAAT, HBSAG, HBSB, HBSAT, HBABN, HBCM, HBCAB, HBCAT, XBCABS, HBEAB, HBEAG, XHEPCS, [id number], [address] [facility], [facility], HBCLT, [facility], [id number], [id number], [address] [place] [address], [id number], [facility], [id number], HCGAT 20: POSITIVE for the BCR-ABL1:  e13a2 (b2a2, p210) 30.77; e14a2 (b3a2, p210) 56.03; e1a2 (p190) 0.03 fusion transcripts. 21: POSITIVE for the BCR-ABL1:  e13a2 (b2a2, p210) 13.2; e14a2 (b3a2, p210) 34.4; e1a2 (p190) 0.0175 fusion transcripts. 21: POSITIVE for the BCR-ABL1:  e13a2 (b2a2, p210) 0.0614 and e14a2 (b3a2, p210) 0.4772 fusion transcripts. 21: POSITIVE for the BCR-ABL1:  e13a2 (b2a2, p210) 0.1481 and e14a2 (b3a2, p210) 1.4140 fusion transcripts. 21: POSITIVE for the BCR-ABL1:  e13a2 (b2a2, p210) 0.0381 and e14a2 (b3a2, p210) 0.3430 fusion transcripts. 22: POSITIVE for the BCR-ABL1:  e13a2 (b2a2, p210) 0.1742 and e14a2 (b3a2, p210) 5.3332 fusion transcripts. Imagin/28/20 abdomen ultrasound:  IMPRESSION: Hepatic steatosis. No evidence for hepatosplenomegaly    Assessment & Plan:   Wm Gutierrez is a 80 y.o. female is seen for evaluation of leukocytosis. 1. CML: Sokal and Euro score intermediate risk. Discussed with patient that this a slow growing leukemia and can be effectively treated with an oral medication in the tyrosine kinase inhibitor class of meds such as Imatinib, Dasatinib, etc. Side effects of these medications include nausea, diarrhea, constipation, rash, liver test abnormalities, most of which are generally troublesome in the first 1-2 months, but then can resolve or improve over time. Previously printed patient information on CML for patient. Recent BCR-ABL1 transcripts are elevated compared to prior likely due to [address] being underdosed. -- Increase Gleevec to 200mg daily and continue this dosing consistently. Advised pt on this. -- Labs (CBC, CMP, bcr-abl) on 21 - will review at next visit  -- If rash worsens, will continue low dose topical steroid first prior to restarting Pred 10mg/d  -- Return in 12 weeks  -- Had both COVID vaccines.      2. H/o Pleural effusions / Pulm edema / LE edema:  Imatinib can cause peripheral edema in up to 10% of cases. Cardiac workup was negative for ischemia. Prior echo showed EF 60-65% and pt may have diastolic dysfunction. Dr. Sammy Malloy ordered [place] nuclear stress test but pt has not completed. No longer on Lasix. 3. HTN:  Lisinopril/HCTZ 40-25mg daily. Managed by Dr. Sammy Malloy. No CP or SOB today. 4. Health maintenance: Last colonoscopy done 2016. Last mammogram 5/21/19.     5. H/o Erythematous rash: No pruritus. Is mild and due to [address]. I appreciate the opportunity to participate in Ms. Ede oconnor.     CC: Dr. Jermaine Erwin      Signed By: Shaye Oconnor MD     February 22, 2022

## 2022-02-22 ENCOUNTER — OFFICE VISIT (OUTPATIENT)
Dept: ONCOLOGY | Age: 82
End: 2022-02-22
Payer: MEDICARE

## 2022-02-22 VITALS
HEART RATE: 68 BPM | BODY MASS INDEX: 28.11 KG/M2 | DIASTOLIC BLOOD PRESSURE: 72 MMHG | OXYGEN SATURATION: 95 % | RESPIRATION RATE: 16 BRPM | WEIGHT: 148.9 LBS | SYSTOLIC BLOOD PRESSURE: 186 MMHG | HEIGHT: 61 IN

## 2022-02-22 DIAGNOSIS — C92.10 CML (CHRONIC MYELOCYTIC LEUKEMIA) (HCC): Primary | ICD-10-CM

## 2022-02-22 DIAGNOSIS — I10 ESSENTIAL HYPERTENSION: ICD-10-CM

## 2022-02-22 DIAGNOSIS — R60.0 BILATERAL LOWER EXTREMITY EDEMA: ICD-10-CM

## 2022-02-22 DIAGNOSIS — Z51.11 CHEMOTHERAPY MANAGEMENT, ENCOUNTER FOR: ICD-10-CM

## 2022-02-22 DIAGNOSIS — L27.0 DRUG RASH: ICD-10-CM

## 2022-02-22 PROCEDURE — G8510 SCR DEP NEG, NO PLAN REQD: HCPCS | Performed by: INTERNAL MEDICINE

## 2022-02-22 PROCEDURE — G8753 SYS BP > OR = 140: HCPCS | Performed by: INTERNAL MEDICINE

## 2022-02-22 PROCEDURE — G8754 DIAS BP LESS 90: HCPCS | Performed by: INTERNAL MEDICINE

## 2022-02-22 PROCEDURE — 99215 OFFICE O/P EST HI 40 MIN: CPT | Performed by: INTERNAL MEDICINE

## 2022-02-22 PROCEDURE — G8400 PT W/DXA NO RESULTS DOC: HCPCS | Performed by: INTERNAL MEDICINE

## 2022-02-22 PROCEDURE — G8536 NO DOC ELDER MAL SCRN: HCPCS | Performed by: INTERNAL MEDICINE

## 2022-02-22 PROCEDURE — G8427 DOCREV CUR MEDS BY ELIG CLIN: HCPCS | Performed by: INTERNAL MEDICINE

## 2022-02-22 PROCEDURE — 1101F PT FALLS ASSESS-DOCD LE1/YR: CPT | Performed by: INTERNAL MEDICINE

## 2022-02-22 PROCEDURE — G8419 CALC BMI OUT NRM PARAM NOF/U: HCPCS | Performed by: INTERNAL MEDICINE

## 2022-02-22 PROCEDURE — 1090F PRES/ABSN URINE INCON ASSESS: CPT | Performed by: INTERNAL MEDICINE

## 2022-02-22 RX ORDER — TRAMADOL HYDROCHLORIDE 50 MG/1
TABLET ORAL
COMMUNITY
Start: 2022-01-27

## 2022-02-22 NOTE — PROGRESS NOTES
Chief Complaint   Patient presents with    Follow-up     Ange Ramon is a pleasant 80year old woman who presents as a follow up.  She reports leg pain

## 2022-03-01 DIAGNOSIS — C92.10 CML (CHRONIC MYELOCYTIC LEUKEMIA) (HCC): ICD-10-CM

## 2022-03-01 RX ORDER — IMATINIB MESYLATE 100 MG/1
TABLET, FILM COATED ORAL
Qty: 60 TABLET | Refills: 3 | Status: SHIPPED | OUTPATIENT
Start: 2022-03-01 | End: 2022-06-06 | Stop reason: SDUPTHER

## 2022-03-01 NOTE — TELEPHONE ENCOUNTER
[address] Kael Fernandez at [facility]  [phone]        03/01/22 10:13 AM Refill of Gleevec pended to Zakiya Paredes NP, for review.

## 2022-05-02 DIAGNOSIS — C92.10 CML (CHRONIC MYELOCYTIC LEUKEMIA) (HCC): ICD-10-CM

## 2022-05-02 DIAGNOSIS — Z51.11 CHEMOTHERAPY MANAGEMENT, ENCOUNTER FOR: ICD-10-CM

## 2022-05-02 LAB
ALBUMIN SERPL-MCNC: 4 G/DL (ref 3.5–5)
ALBUMIN/GLOB SERPL: 1.5 {RATIO} (ref 1.1–2.2)
ALP SERPL-CCNC: 57 U/L (ref 45–117)
ALT SERPL-CCNC: 29 U/L (ref 12–78)
ANION GAP SERPL CALC-SCNC: 7 MMOL/L (ref 5–15)
AST SERPL-CCNC: 22 U/L (ref 15–37)
BASOPHILS # BLD: 0.1 K/UL (ref 0–0.1)
BASOPHILS NFR BLD: 1 % (ref 0–1)
BILIRUB SERPL-MCNC: 0.4 MG/DL (ref 0.2–1)
BUN SERPL-MCNC: 15 MG/DL (ref 6–20)
BUN/CREAT SERPL: 22 (ref 12–20)
CALCIUM SERPL-MCNC: 9.6 MG/DL (ref 8.5–10.1)
CHLORIDE SERPL-SCNC: 105 MMOL/L (ref 97–108)
CO2 SERPL-SCNC: 28 MMOL/L (ref 21–32)
CREAT SERPL-MCNC: 0.67 MG/DL (ref 0.55–1.02)
DIFFERENTIAL METHOD BLD: ABNORMAL
EOSINOPHIL # BLD: 0.1 K/UL (ref 0–0.4)
EOSINOPHIL NFR BLD: 2 % (ref 0–7)
ERYTHROCYTE [DISTWIDTH] IN BLOOD BY AUTOMATED COUNT: 15.2 % (ref 11.5–14.5)
GLOBULIN SER CALC-MCNC: 2.7 G/DL (ref 2–4)
GLUCOSE SERPL-MCNC: 112 MG/DL (ref 65–100)
HCT VFR BLD AUTO: 35.8 % (ref 35–47)
HGB BLD-MCNC: 11.6 G/DL (ref 11.5–16)
IMM GRANULOCYTES # BLD AUTO: 0 K/UL (ref 0–0.04)
IMM GRANULOCYTES NFR BLD AUTO: 0 % (ref 0–0.5)
LYMPHOCYTES # BLD: 1.3 K/UL (ref 0.8–3.5)
LYMPHOCYTES NFR BLD: 26 % (ref 12–49)
MCH RBC QN AUTO: 31.1 PG (ref 26–34)
MCHC RBC AUTO-ENTMCNC: 32.4 G/DL (ref 30–36.5)
MCV RBC AUTO: 96 FL (ref 80–99)
MONOCYTES # BLD: 0.6 K/UL (ref 0–1)
MONOCYTES NFR BLD: 13 % (ref 5–13)
NEUTS SEG # BLD: 2.9 K/UL (ref 1.8–8)
NEUTS SEG NFR BLD: 58 % (ref 32–75)
NRBC # BLD: 0 K/UL (ref 0–0.01)
NRBC BLD-RTO: 0 PER 100 WBC
PLATELET # BLD AUTO: 253 K/UL (ref 150–400)
PMV BLD AUTO: 11.4 FL (ref 8.9–12.9)
POTASSIUM SERPL-SCNC: 4.4 MMOL/L (ref 3.5–5.1)
PROT SERPL-MCNC: 6.7 G/DL (ref 6.4–8.2)
RBC # BLD AUTO: 3.73 M/UL (ref 3.8–5.2)
SODIUM SERPL-SCNC: 140 MMOL/L (ref 136–145)
WBC # BLD AUTO: 5 K/UL (ref 3.6–11)

## 2022-05-11 LAB
BACKGROUND, BCR6T: NORMAL
INTERPRETATION: 480488: POSITIVE
LAB DIRECTOR NAME PROVIDER: NORMAL
T(ABL1,BCR)B2A2/CONTROL BLD/T: 0.18 %
T(ABL1,BCR)B2A2/CONTROL BLD/T: NORMAL %
T(ABL1,BCR)B3A2/CONTROL BLD/T: 1.98 %
T(ABL1,BCR)E1A2/CONTROL BLD/T: NORMAL %

## 2022-06-03 NOTE — PROGRESS NOTES
[id number] [facility] Oncology at [address] [place] [address]  [phone]    Hematology / Oncology Established Visit    Reason for Visit:   Paul Mdcaniels is a [de-identified] y.o. female who is seen for follow up of CML. PCP is Dr. Xiao Vanessa.     Hematology Oncology Treatment History:     Diagnosis: Chronic myelogenous leukemia (CML)    Stage: N/A    Pathology:   - 8/1/20 Peripheral blood BCR-ABL1 FISH: 84% of nuclei positive for BCR/ABL1 fusion    -8/28/20 Bone marrow biopsy: Chronic myeloid leukemia, BCR-ABL1-positive, chronic phase   The bone marrow biopsy is 90% cellular and adequate for evaluation. The myeloid series appears hyperplastic and left-shifted with increased proportion of promyelocytes and myelocytes. Blasts do not appear increased. The erythroid series is reduced in number but progresses through the full maturation sequence without significant dysplasia. Megakaryocytes are increased in number with dwarf forms seen. Plasma cells and lymphocytes do not appear increased in number.   -Cytogenetics: Karyotype: 46,XX,t(9;22)(q34.1;q11.2)[20]; Interpretation: ABNORMAL FEMALE KARYOTYPE   The t(9;22)(q34.1;q11.2), resulting in formation of the [place] chromosome, is observed in 90-95% of cases with chronic myeloid leukemia, BCR-ABL1 positive. It is also observed in acute lymphoblastic leukemia/lymphoma and mixed phenotype acute leukemia (MPAL) with t(9;22)(q34.1;q11.2);BCR-ABL1 and in 1-2% of AML. These results should be nterpreted in the context of clinical and histopathologic findings. .    Prior Treatment: Dasatinib 100mg PO daily, started 9/29/2020 - mid Oct 2020 (discontinued for mood changes, fluid retention). Current Treatment: Gleevec 200mg PO daily     History of Present Illness:   Paul Mcdaniels is a [de-identified] y.o. female with HTN, Hyperlipidemia, allergic rhinitis, GERD comes in for follow up of CML. Per PCP notes, pt has had fatigue for past 4 months.  Recently was treated with UTI in late June 2020 treated with Amoxicillin. She also developed a rash in late June, which was treated steroid ointment, but no oral Prednisone. Dermatologist felt this was a heat rash. She does endorse yeast type rash under breast or in groin which comes and goes, not new. No unintentional weight loss, good appetite. No fevers, chills, night sweats. Had a recent chest CT which was negative for PE. Back in Feb 2020, she felt severely exhausted for 1 week, and the recovered. She wondered if she had COVID19 virus. Outside labs:   1/21/20: WBC 10.6, Hgb 13.8, MCV 92,   7/20/20: WBC 20.5, Hgb 13, MCV 91,   7/27/20: WBC 17.3, Hgb 12.4, MCV 95,     After being on Dasatinib for 2 weeks, she states felt terrible - was angry at everyone, felt crazy, felt like having a persistent hot flash. When asked to elaborate on feeling crazy, she denies hallucinations or confusing, but does endorse mood problems while on the medication. She also had weight gain and leg swelling. She stopped Dasatinib at the last visit, started on Lasix daily. Gradually over the past 2 weeks, the leg swelling improved and she feels improved. Cardiac workup with EKG, echo negative. CXR did show pleural effusions and dopplers negative for DVT. Interval History:  Patient is seen for follow up up of CML, currently on Gleevec 200mg daily. No worsening or significant rash - still has nonpruritic lesions on arms and face. No n/v, SOB, cough, fatigue. Has stable swelling in legs. No recent infections. She does have occasional diarrhea, which she is managing with Imodium prn. PMHx: HTN  PSurgHx: Appendectomy, bilateral hip replacement, total hysterectomy for uterine prolapse in 2006 with pathology showing squamous metaplasia of cervix. SHx: Former smoker, No EtOH. . FHx: No blood disorders per patient. Review of Systems: A complete review of systems was obtained, negative except as described above.     Physical Exam:     Visit Vitals  BP (!) 150/73 (BP 1 Location: Right upper arm, BP Patient Position: Sitting, BP Cuff Size: Adult)   Pulse 60   Resp 16   Ht 5' 1\" (1.549 m)   Wt 149 lb (67.6 kg)   SpO2 97%   BMI 28.15 kg/m²     ECOG PS: 1  General: Well developed, no acute distress  Neck: Supple  Lymphatic: No cervical, supraclavicular adenopathy  Respiratory: CTAB, normal respiratory effort  CV: Normal rate, regular rhythm, no murmurs, 1+ pitting edema in bilat ankles  GI: Soft, nontender, nondistended, no masses  Skin: Mild scattered erythematous lesions on face, arms  Neuro/Psych: Alert, oriented. Moving all 4 extremities. Appropriate affect, normal judgment/insight. Results:     Lab Results   Component Value Date/Time    WBC 5.0 05/02/2022 12:10 PM    HGB 11.6 05/02/2022 12:10 PM    HCT 35.8 05/02/2022 12:10 PM    PLATELET 031 42/03/0016 12:10 PM    MCV 96.0 05/02/2022 12:10 PM    ABS. NEUTROPHILS 2.9 05/02/2022 12:10 PM     Lab Results   Component Value Date/Time    Sodium 140 05/02/2022 12:10 PM    Potassium 4.4 05/02/2022 12:10 PM    Chloride 105 05/02/2022 12:10 PM    CO2 28 05/02/2022 12:10 PM    Glucose 112 (H) 05/02/2022 12:10 PM    BUN 15 05/02/2022 12:10 PM    Creatinine 0.67 05/02/2022 12:10 PM    GFR est AA >60 05/02/2022 12:10 PM    GFR est non-AA >60 05/02/2022 12:10 PM    Calcium 9.6 05/02/2022 12:10 PM     Lab Results   Component Value Date/Time    Bilirubin, total 0.4 05/02/2022 12:10 PM    ALT (SGPT) 29 05/02/2022 12:10 PM    Alk.  phosphatase 57 05/02/2022 12:10 PM    Protein, total 6.7 05/02/2022 12:10 PM    Albumin 4.0 05/02/2022 12:10 PM    Globulin 2.7 05/02/2022 12:10 PM     No results found for: IRON, FE, TIBC, IBCT, PSAT, FERR    No results found for: B12LT, FOL, RBCF  No results found for: TSH, TSH2, TSH3, TSHP, TSHEXT, TSHEXT  No results found for: HAMAT, HAAB, HABT, HAAT, HBSAG, HBSB, HBSAT, HBABN, HBCM, HBCAB, HBCAT, XBCABS, [facility], [address] [place] [address], XHES, [id number], [facility], [facility], [facility], [id number], [id number], [facility], [id number], [facility], [id number], HCGAT     20: POSITIVE for the BCR-ABL1:  e13a2 (b2a2, p210) 30.77; e14a2 (b3a2, p210) 56.03; e1a2 (p190) 0.03 fusion transcripts. 21: POSITIVE for the BCR-ABL1:  e13a2 (b2a2, p210) 13.2; e14a2 (b3a2, p210) 34.4; e1a2 (p190) 0.0175 fusion transcripts. 21: POSITIVE for the BCR-ABL1:  e13a2 (b2a2, p210) 0.0614 and e14a2 (b3a2, p210) 0.4772 fusion transcripts. 21: POSITIVE for the BCR-ABL1:  e13a2 (b2a2, p210) 0.1481 and e14a2 (b3a2, p210) 1.4140 fusion transcripts. 21: POSITIVE for the BCR-ABL1:  e13a2 (b2a2, p210) 0.0381 and e14a2 (b3a2, p210) 0.3430 fusion transcripts. 22: POSITIVE for the BCR-ABL1:  e13a2 (b2a2, p210) 0.1742 and e14a2 (b3a2, p210) 5.3332 fusion transcripts. 22: POSITIVE for the BCR-ABL1:  e13a2 (b2a2, p210) 0.1770 and e14a2 (b3a2, p210) 1.9828 fusion transcripts. Imagin/28/20 abdomen ultrasound:  IMPRESSION: Hepatic steatosis. No evidence for hepatosplenomegaly    Assessment & Plan:   Melanie Thompson is a [de-identified] y.o. female is seen for evaluation of leukocytosis. 1. CML: Sokal and Euro score intermediate risk. Discussed with patient that this a slow growing leukemia and can be effectively treated with an oral medication in the tyrosine kinase inhibitor class of meds such as Imatinib, Dasatinib, etc. Side effects of these medications include nausea, diarrhea, constipation, rash, liver test abnormalities, most of which are generally troublesome in the first 1-2 months, but then can resolve or improve over time. Previously printed patient information on CML for patient. Recent BCR-ABL1 transcripts are decreasing compared to prior after Gleevec dose increase to 200mg/d. -- Continue Gleevec 200mg daily and continue this dosing consistently.    -- Labs (CBC, CMP, bcr-abl) on 8/15/22 - will review at next visit  -- If rash worsens, will continue low dose topical steroid first prior to restarting Pred 10mg/d  -- Return in 12 weeks  -- Had both COVID vaccines. 2. H/o Pleural effusions / Pulm edema / LE edema:  Imatinib can cause peripheral edema in up to 10% of cases. Cardiac workup was negative for ischemia. Prior echo showed EF 60-65% and pt may have diastolic dysfunction. Dr. Varghese Richards ordered Marisa Norlander nuclear stress test but pt has not completed. No longer on Lasix. 3. HTN:  Lisinopril/HCTZ 40-25mg daily. Managed by Dr. Varghese Richards. No CP or SOB today. 4. Health maintenance: Last colonoscopy done 2016. Last mammogram 5/21/19.     5. H/o Erythematous rash: No pruritus. Is mild and due to [address]. I appreciate the opportunity to participate in Ms. Joni oconnor. CC: Dr. Toñito De personally provided the service today.      Signed By: Peter Wheatley MD     June 6, 2022

## 2022-06-06 ENCOUNTER — OFFICE VISIT (OUTPATIENT)
Dept: ONCOLOGY | Age: 82
End: 2022-06-06
Payer: MEDICARE

## 2022-06-06 VITALS
BODY MASS INDEX: 28.13 KG/M2 | SYSTOLIC BLOOD PRESSURE: 150 MMHG | WEIGHT: 149 LBS | DIASTOLIC BLOOD PRESSURE: 73 MMHG | HEART RATE: 60 BPM | HEIGHT: 61 IN | OXYGEN SATURATION: 97 % | RESPIRATION RATE: 16 BRPM

## 2022-06-06 DIAGNOSIS — C92.10 CML (CHRONIC MYELOCYTIC LEUKEMIA) (HCC): Primary | ICD-10-CM

## 2022-06-06 DIAGNOSIS — R19.7 DIARRHEA, UNSPECIFIED TYPE: ICD-10-CM

## 2022-06-06 DIAGNOSIS — I10 ESSENTIAL HYPERTENSION: ICD-10-CM

## 2022-06-06 DIAGNOSIS — Z87.2 HISTORY OF DRUG RASH: ICD-10-CM

## 2022-06-06 DIAGNOSIS — Z51.11 CHEMOTHERAPY MANAGEMENT, ENCOUNTER FOR: ICD-10-CM

## 2022-06-06 PROCEDURE — 1123F ACP DISCUSS/DSCN MKR DOCD: CPT | Performed by: INTERNAL MEDICINE

## 2022-06-06 PROCEDURE — 1090F PRES/ABSN URINE INCON ASSESS: CPT | Performed by: INTERNAL MEDICINE

## 2022-06-06 PROCEDURE — 99215 OFFICE O/P EST HI 40 MIN: CPT | Performed by: INTERNAL MEDICINE

## 2022-06-06 PROCEDURE — G8753 SYS BP > OR = 140: HCPCS | Performed by: INTERNAL MEDICINE

## 2022-06-06 PROCEDURE — 1101F PT FALLS ASSESS-DOCD LE1/YR: CPT | Performed by: INTERNAL MEDICINE

## 2022-06-06 PROCEDURE — G8536 NO DOC ELDER MAL SCRN: HCPCS | Performed by: INTERNAL MEDICINE

## 2022-06-06 PROCEDURE — G8400 PT W/DXA NO RESULTS DOC: HCPCS | Performed by: INTERNAL MEDICINE

## 2022-06-06 PROCEDURE — G8754 DIAS BP LESS 90: HCPCS | Performed by: INTERNAL MEDICINE

## 2022-06-06 PROCEDURE — G8510 SCR DEP NEG, NO PLAN REQD: HCPCS | Performed by: INTERNAL MEDICINE

## 2022-06-06 PROCEDURE — G8417 CALC BMI ABV UP PARAM F/U: HCPCS | Performed by: INTERNAL MEDICINE

## 2022-06-06 PROCEDURE — G8427 DOCREV CUR MEDS BY ELIG CLIN: HCPCS | Performed by: INTERNAL MEDICINE

## 2022-06-06 RX ORDER — IMATINIB MESYLATE 100 MG/1
TABLET, FILM COATED ORAL
Qty: 60 TABLET | Refills: 3 | Status: SHIPPED | OUTPATIENT
Start: 2022-06-06 | End: 2022-10-25

## 2022-06-06 NOTE — PROGRESS NOTES
Identified pt with two pt identifiers. Reviewed record in preparation for visit and have obtained necessary documentation. All patient medications has been reviewed. Chief Complaint   Patient presents with    Follow-up     Additional information about chief complaint:    Visit Vitals  BP (!) 150/73 (BP 1 Location: Right upper arm, BP Patient Position: Sitting, BP Cuff Size: Adult)   Pulse 60   Resp 16   Ht 5' 1\" (1.549 m)   Wt 149 lb (67.6 kg)   SpO2 97%   BMI 28.15 kg/m²       Health Maintenance Due   Topic    Pneumococcal 65+ years (1 - PCV)    COVID-19 Vaccine (1)    DTaP/Tdap/Td series (1 - Tdap)    Shingrix Vaccine Age 50> (1 of 2)    Bone Densitometry (Dexa) Screening     Medicare Yearly Exam        1. Have you been to the ER, urgent care clinic since your last visit? Hospitalized since your last visit? No    2. Have you seen or consulted any other health care providers outside of the [address] [place] [address] since your last visit? Include any pap smears or colon screening.  No

## 2022-08-23 LAB
ALBUMIN SERPL-MCNC: 4.7 G/DL (ref 3.7–4.7)
ALBUMIN/GLOB SERPL: 2.5 {RATIO} (ref 1.2–2.2)
ALP SERPL-CCNC: 62 IU/L (ref 44–121)
ALT SERPL-CCNC: 16 IU/L
AST SERPL-CCNC: 20 IU/L (ref 0–40)
BACKGROUND: 480503: NORMAL
BASOPHILS # BLD AUTO: 0.1 X10E3/UL
BASOPHILS NFR BLD AUTO: 1 %
BILIRUB SERPL-MCNC: <0.2 MG/DL (ref 0–1.2)
BUN SERPL-MCNC: 14 MG/DL
BUN/CREAT SERPL: 18
CALCIUM SERPL-MCNC: 9.6 MG/DL
CHLORIDE SERPL-SCNC: 103 MMOL/L (ref 96–106)
CO2 SERPL-SCNC: 23 MMOL/L (ref 20–29)
CREAT SERPL-MCNC: 0.78 MG/DL
EGFR: 77 ML/MIN/1.73
EOSINOPHIL # BLD AUTO: 0.1 X10E3/UL
EOSINOPHIL NFR BLD AUTO: 2 %
ERYTHROCYTE [DISTWIDTH] IN BLOOD BY AUTOMATED COUNT: 13.3 %
GLOBULIN SER CALC-MCNC: 1.9 G/DL (ref 1.5–4.5)
GLUCOSE SERPL-MCNC: 115 MG/DL (ref 65–99)
HCT VFR BLD AUTO: 34.3 %
HGB BLD-MCNC: 11.7 G/DL
IMM GRANULOCYTES # BLD AUTO: 0 X10E3/UL
IMM GRANULOCYTES NFR BLD AUTO: 0 %
INTERPRETATION: 480488: POSITIVE
LAB DIRECTOR NAME PROVIDER: NORMAL
LYMPHOCYTES # BLD AUTO: 1.5 X10E3/UL
LYMPHOCYTES NFR BLD AUTO: 24 %
MCH RBC QN AUTO: 32.2 PG
MCHC RBC AUTO-ENTMCNC: 34.1 G/DL
MCV RBC AUTO: 95 FL
MONOCYTES # BLD AUTO: 0.8 X10E3/UL
MONOCYTES NFR BLD AUTO: 13 %
NEUTROPHILS # BLD AUTO: 3.8 X10E3/UL
NEUTROPHILS NFR BLD AUTO: 60 %
PLATELET # BLD AUTO: 241 X10E3/UL (ref 150–450)
POTASSIUM SERPL-SCNC: 3.9 MMOL/L (ref 3.5–5.2)
PROT SERPL-MCNC: 6.6 G/DL (ref 6–8.5)
RBC # BLD AUTO: 3.63 X10E6/UL
REF LAB TEST METHOD: NORMAL
SODIUM SERPL-SCNC: 144 MMOL/L (ref 134–144)
T(ABL1,BCR)B2A2/CONTROL BLD/T: NORMAL %
T(ABL1,BCR)B3A2/CONTROL BLD/T: 0.63 %
T(ABL1,BCR)E1A2/CONTROL BLD/T: NORMAL %
WBC # BLD AUTO: 6.2 X10E3/UL (ref 3.4–10.8)

## 2022-09-12 ENCOUNTER — OFFICE VISIT (OUTPATIENT)
Dept: ONCOLOGY | Age: 82
End: 2022-09-12
Payer: MEDICARE

## 2022-09-12 VITALS
WEIGHT: 145.8 LBS | RESPIRATION RATE: 18 BRPM | SYSTOLIC BLOOD PRESSURE: 164 MMHG | BODY MASS INDEX: 27.53 KG/M2 | HEART RATE: 73 BPM | OXYGEN SATURATION: 98 % | DIASTOLIC BLOOD PRESSURE: 78 MMHG | HEIGHT: 61 IN | TEMPERATURE: 98.3 F

## 2022-09-12 DIAGNOSIS — C92.10 CML (CHRONIC MYELOCYTIC LEUKEMIA) (HCC): Primary | ICD-10-CM

## 2022-09-12 DIAGNOSIS — Z51.81 ENCOUNTER FOR MONITORING GLEEVEC THERAPY: ICD-10-CM

## 2022-09-12 DIAGNOSIS — Z87.2 HISTORY OF DRUG RASH: ICD-10-CM

## 2022-09-12 DIAGNOSIS — I10 ESSENTIAL HYPERTENSION: ICD-10-CM

## 2022-09-12 DIAGNOSIS — Z79.899 ENCOUNTER FOR MONITORING GLEEVEC THERAPY: ICD-10-CM

## 2022-09-12 PROCEDURE — 1090F PRES/ABSN URINE INCON ASSESS: CPT | Performed by: INTERNAL MEDICINE

## 2022-09-12 PROCEDURE — G8536 NO DOC ELDER MAL SCRN: HCPCS | Performed by: INTERNAL MEDICINE

## 2022-09-12 PROCEDURE — 1101F PT FALLS ASSESS-DOCD LE1/YR: CPT | Performed by: INTERNAL MEDICINE

## 2022-09-12 PROCEDURE — G8753 SYS BP > OR = 140: HCPCS | Performed by: INTERNAL MEDICINE

## 2022-09-12 PROCEDURE — G8427 DOCREV CUR MEDS BY ELIG CLIN: HCPCS | Performed by: INTERNAL MEDICINE

## 2022-09-12 PROCEDURE — G8754 DIAS BP LESS 90: HCPCS | Performed by: INTERNAL MEDICINE

## 2022-09-12 PROCEDURE — G8417 CALC BMI ABV UP PARAM F/U: HCPCS | Performed by: INTERNAL MEDICINE

## 2022-09-12 PROCEDURE — 1123F ACP DISCUSS/DSCN MKR DOCD: CPT | Performed by: INTERNAL MEDICINE

## 2022-09-12 PROCEDURE — G8400 PT W/DXA NO RESULTS DOC: HCPCS | Performed by: INTERNAL MEDICINE

## 2022-09-12 PROCEDURE — G8432 DEP SCR NOT DOC, RNG: HCPCS | Performed by: INTERNAL MEDICINE

## 2022-09-12 PROCEDURE — 99215 OFFICE O/P EST HI 40 MIN: CPT | Performed by: INTERNAL MEDICINE

## 2022-09-12 NOTE — PROGRESS NOTES
1. Have you been to the ER, urgent care clinic since your last visit? Hospitalized since your last visit? No    2. Have you seen or consulted any other health care providers outside of the [address] [place] [address] since your last visit? Include any pap smears or colon screening. No        Visit Vitals  BP (!) 194/73 (BP 1 Location: Right upper arm, BP Patient Position: Sitting, BP Cuff Size: Adult)   Pulse 73   Temp 98.3 °F (36.8 °C) (Oral)   Resp 18   Ht 5' 1\" (1.549 m)   Wt 145 lb 12.8 oz (66.1 kg)   SpO2 98%   BMI 27.55 kg/m²      Patient denies lightheadedness, dizziness, vision changes, or chest pain. Will update MD.      Chief Complaint   Patient presents with    Follow-up     Patient presents as a follow-up for CML. She reports pain in both hips at 5/10.

## 2022-09-12 NOTE — PROGRESS NOTES
[address] [facility] Oncology at [facility]  [phone]    Hematology / Oncology Established Visit    Reason for Visit:   Roxana Patel is a [de-identified] y.o. female who is seen for follow up of CML. PCP is Dr. Johnny Ibrahim.     Hematology Oncology Treatment History:     Diagnosis: Chronic myelogenous leukemia (CML)    Stage: N/A    Pathology:   - 8/1/20 Peripheral blood BCR-ABL1 FISH: 84% of nuclei positive for BCR/ABL1 fusion    -8/28/20 Bone marrow biopsy: Chronic myeloid leukemia, BCR-ABL1-positive, chronic phase   The bone marrow biopsy is 90% cellular and adequate for evaluation. The myeloid series appears hyperplastic and left-shifted with increased proportion of promyelocytes and myelocytes. Blasts do not appear increased. The erythroid series is reduced in number but progresses through the full maturation sequence without significant dysplasia. Megakaryocytes are increased in number with dwarf forms seen. Plasma cells and lymphocytes do not appear increased in number.   -Cytogenetics: Karyotype: 46,XX,t(9;22)(q34.1;q11.2)[20]; Interpretation: ABNORMAL FEMALE KARYOTYPE   The t(9;22)(q34.1;q11.2), resulting in formation of the [place] chromosome, is observed in 90-95% of cases with chronic myeloid leukemia, BCR-ABL1 positive. It is also observed in acute lymphoblastic leukemia/lymphoma and mixed phenotype acute leukemia (MPAL) with t(9;22)(q34.1;q11.2);BCR-ABL1 and in 1-2% of AML. These results should be nterpreted in the context of clinical and histopathologic findings. .    Prior Treatment: Dasatinib 100mg PO daily, started 9/29/2020 - mid Oct 2020 (discontinued for mood changes, fluid retention). Current Treatment: Gleevec 200mg PO daily     History of Present Illness:   Roxana Patel is a [de-identified] y.o. female with HTN, Hyperlipidemia, allergic rhinitis, GERD comes in for follow up of CML. Per PCP notes, pt has had fatigue for past 4 months.  Recently was treated with UTI in late June 2020 treated with Amoxicillin. She also developed a rash in late June, which was treated steroid ointment, but no oral Prednisone. Dermatologist felt this was a heat rash. She does endorse yeast type rash under breast or in groin which comes and goes, not new. No unintentional weight loss, good appetite. No fevers, chills, night sweats. Had a recent chest CT which was negative for PE. Back in Feb 2020, she felt severely exhausted for 1 week, and the recovered. She wondered if she had COVID19 virus. Outside labs:   1/21/20: WBC 10.6, Hgb 13.8, MCV 92,   7/20/20: WBC 20.5, Hgb 13, MCV 91,   7/27/20: WBC 17.3, Hgb 12.4, MCV 95,     After initially being on Dasatinib for 2 weeks, she states felt terrible - was angry at everyone, felt crazy, felt like having a persistent hot flash. When asked to elaborate on feeling crazy, she denies hallucinations or confusing, but does endorse mood problems while on the medication. She also had weight gain and leg swelling. She stopped Dasatinib at the last visit, started on Lasix daily. Gradually over the past 2 weeks, the leg swelling improved and she feels improved. Cardiac workup with EKG, echo negative. CXR did show pleural effusions and dopplers negative for DVT. Interval History:  Patient is seen for follow up up of CML, currently on Gleevec 200mg daily. Patient states she is doing well overall. Reports fatigue. Denies diarrhea or constipation. Denies edema, SOB, CP. Denies pruritis or rash. Adequate nutrition and hydration status. Patient has no concerns today. PMHx: HTN  PSurgHx: Appendectomy, bilateral hip replacement, total hysterectomy for uterine prolapse in 2006 with pathology showing squamous metaplasia of cervix. SHx: Former smoker, No EtOH. . FHx: No blood disorders per patient. Review of Systems: A complete review of systems was obtained, negative except as described above.     Physical Exam:     Visit Vitals  BP (!) 164/78 (BP 1 Location: Right upper arm, BP Patient Position: Sitting, BP Cuff Size: Adult)   Pulse 73   Temp 98.3 °F (36.8 °C) (Oral)   Resp 18   Ht 5' 1\" (1.549 m)   Wt 145 lb 12.8 oz (66.1 kg)   SpO2 98%   BMI 27.55 kg/m²     ECOG PS: 1  General: Well developed, no acute distress  Neck: Supple  Lymphatic: No cervical, supraclavicular adenopathy  Respiratory: CTAB, normal respiratory effort  CV: Normal rate, regular rhythm, no murmurs, no peripheral edema. GI: Soft, nontender, nondistended, no masses  Skin: No rashes, ecchymosis. Neuro/Psych: Alert, oriented. Moving all 4 extremities. Appropriate affect, normal judgment/insight. Results:     Lab Results   Component Value Date/Time    WBC 6.2 08/15/2022 12:00 AM    HGB 11.7 08/15/2022 12:00 AM    HCT 34.3 08/15/2022 12:00 AM    PLATELET 493 07/21/4220 12:00 AM    MCV 95 08/15/2022 12:00 AM    ABS. NEUTROPHILS 3.8 08/15/2022 12:00 AM     Lab Results   Component Value Date/Time    Sodium 144 08/15/2022 12:00 AM    Potassium 3.9 08/15/2022 12:00 AM    Chloride 103 08/15/2022 12:00 AM    CO2 23 08/15/2022 12:00 AM    Glucose 115 (H) 08/15/2022 12:00 AM    BUN 14 08/15/2022 12:00 AM    Creatinine 0.78 08/15/2022 12:00 AM    GFR est AA >60 05/02/2022 12:10 PM    GFR est non-AA >60 05/02/2022 12:10 PM    Calcium 9.6 08/15/2022 12:00 AM     Lab Results   Component Value Date/Time    Bilirubin, total <0.2 08/15/2022 12:00 AM    ALT (SGPT) 16 08/15/2022 12:00 AM    Alk.  phosphatase 62 08/15/2022 12:00 AM    Protein, total 6.6 08/15/2022 12:00 AM    Albumin 4.7 08/15/2022 12:00 AM    Globulin 2.7 05/02/2022 12:10 PM     No results found for: IRON, FE, TIBC, IBCT, PSAT, FERR    No results found for: B12LT, FOL, RBCF  No results found for: TSH, TSH2, TSH3, TSHP, TSHEXT, TSHEXT  No results found for: HAMAT, HAAB, HABT, HAAT, HBSAG, HBSB, HBSAT, HBABN, HBCM, HBCAB, HBCAT, XBCABS, HBEAB, HBEAG, XHEPCS, [id number], HBEGLT, HBCMLT, HBCLT, HBEBLT, [id number], [id number], HAVMLT, [id number], [place], [id number], HCGAT     20: POSITIVE for the BCR-ABL1:  e13a2 (b2a2, p210) 30.77; e14a2 (b3a2, p210) 56.03; e1a2 (p190) 0.03 fusion transcripts. 21: POSITIVE for the BCR-ABL1:  e13a2 (b2a2, p210) 13.2; e14a2 (b3a2, p210) 34.4; e1a2 (p190) 0.0175 fusion transcripts. 21: POSITIVE for the BCR-ABL1:  e13a2 (b2a2, p210) 0.0614 and e14a2 (b3a2, p210) 0.4772 fusion transcripts. 21: POSITIVE for the BCR-ABL1:  e13a2 (b2a2, p210) 0.1481 and e14a2 (b3a2, p210) 1.4140 fusion transcripts. 21: POSITIVE for the BCR-ABL1:  e13a2 (b2a2, p210) 0.0381 and e14a2 (b3a2, p210) 0.3430 fusion transcripts. 22: POSITIVE for the BCR-ABL1:  e13a2 (b2a2, p210) 0.1742 and e14a2 (b3a2, p210) 5.3332 fusion transcripts. 22: POSITIVE for the BCR-ABL1:  e13a2 (b2a2, p210) 0.1770 and e14a2 (b3a2, p210) 1.9828 fusion transcripts. 8/15/22 POSITIVE for the BCR-ABL1:  e13a2 (b2a2, p210) < 0.0032% and e14a2 (b3a2, p210) 0.6344 fusion transcripts      Imagin/28/20 abdomen ultrasound:  IMPRESSION: Hepatic steatosis. No evidence for hepatosplenomegaly    Assessment & Plan:   Tobin Goldman is a [de-identified] y.o. female is seen for evaluation of leukocytosis. 1. CML: Sokal and Euro score intermediate risk. Discussed with patient that this a slow growing leukemia and can be effectively treated with an oral medication in the tyrosine kinase inhibitor class of meds such as Imatinib, Dasatinib, etc. Side effects of these medications include nausea, diarrhea, constipation, rash, liver test abnormalities, most of which are generally troublesome in the first 1-2 months, but then can resolve or improve over time. Previously printed patient information on CML for patient. Recent BCR-ABL1 transcripts are decreasing compared to prior after Gleevec dose increase to 200mg/d. -- Continue Gleevec 200mg daily and continue this dosing consistently.    -- Labs (CBC, CMP, bcr-abl) on 22 - will review at next visit  -- If rash worsens, will continue low dose topical steroid first prior to restarting Pred 10mg/d  -- Return in 12 weeks with labs 2 weeks prior, MD visit. -- Had both COVID vaccines. 2. H/o Pleural effusions / Pulm edema / LE edema:  Imatinib can cause peripheral edema in up to 10% of cases. Cardiac workup was negative for ischemia. Prior echo showed EF 60-65% and pt may have diastolic dysfunction. Dr. Krystian Leo ordered Yen Bodo nuclear stress test but pt has not completed. No longer on Lasix. 3. HTN:   Better reading with manual cuff. Lisinopril/HCTZ 40-25mg daily. No CP or SOB today. Managed by Dr. Krystian Leo. 4. Health maintenance: Last colonoscopy done 2016. Last mammogram 5/21/19. No further mammograms needed unless symptomatic. 5. H/o Erythematous rash: Was due to [address] and resolved today. I appreciate the opportunity to participate in Ms. Angelito Cavanaugh care. CC: Dr. Sherrie Garcia      I personally saw and evaluated the patient and performed the key components of medical decision making. The history, physical exam, and documentation were performed by Dulce Maria Scott NP. I reviewed and verified the above documentation and modified it as needed. Pt is tolerating current dose of Gleevec with good response thus far. Cannot tolerate higher dose of medication.     Signed By: Antolin Dominguez MD     September 12, 2022

## 2022-10-20 ENCOUNTER — TELEPHONE (OUTPATIENT)
Dept: ONCOLOGY | Age: 82
End: 2022-10-20

## 2022-10-20 NOTE — TELEPHONE ENCOUNTER
[address] at [place]  [phone]    10/20/22 11:09 AM - Attempted to call the patient. There was no answer. Left a voicemail for the patient to call back at their earliest convenience along with the phone number to our office. [address] at [place]  [phone]    10/20/22 11:14 AM - Received call from the patient's , Feng Crenshaw. Patient's ID verified x 2. Advised we need a photocopy of the patient's COVID vaccines administration. Advised he can send this via [address] Feng Crenshaw voiced understanding. Advised the bone density test was likely done at an outside facility, so we will need to request records. He states we could check with [facility]. The patient used to see Dr. [place] Foster. Advised this nurse would attempt to request records. Feng Crenshaw voiced understanding and gratitude for the call. No further questions or concerns.

## 2022-10-24 DIAGNOSIS — C92.10 CML (CHRONIC MYELOCYTIC LEUKEMIA) (HCC): ICD-10-CM

## 2022-10-25 RX ORDER — IMATINIB MESYLATE 100 MG/1
TABLET, FILM COATED ORAL
Qty: 60 TABLET | Refills: 3 | Status: SHIPPED | OUTPATIENT
Start: 2022-10-25

## 2022-11-29 NOTE — PROGRESS NOTES
[address] [facility] at [facility]  [phone]    Hematology / Oncology Established Visit    Reason for Visit:   Melissa Saravia is a [de-identified] y.o. female who is seen for follow up of CML. PCP is Dr. Emelyn Branham.     Hematology Oncology Treatment History:     Diagnosis: Chronic myelogenous leukemia (CML)    Stage: N/A    Pathology:   - 8/1/20 Peripheral blood BCR-ABL1 FISH: 84% of nuclei positive for BCR/ABL1 fusion    -8/28/20 Bone marrow biopsy: Chronic myeloid leukemia, BCR-ABL1-positive, chronic phase   The bone marrow biopsy is 90% cellular and adequate for evaluation. The myeloid series appears hyperplastic and left-shifted with increased proportion of promyelocytes and myelocytes. Blasts do not appear increased. The erythroid series is reduced in number but progresses through the full maturation sequence without significant dysplasia. Megakaryocytes are increased in number with dwarf forms seen. Plasma cells and lymphocytes do not appear increased in number.   -Cytogenetics: Karyotype: 46,XX,t(9;22)(q34.1;q11.2)[20]; Interpretation: ABNORMAL FEMALE KARYOTYPE   The t(9;22)(q34.1;q11.2), resulting in formation of the [place] chromosome, is observed in 90-95% of cases with chronic myeloid leukemia, BCR-ABL1 positive. It is also observed in acute lymphoblastic leukemia/lymphoma and mixed phenotype acute leukemia (MPAL) with t(9;22)(q34.1;q11.2);BCR-ABL1 and in 1-2% of AML. These results should be nterpreted in the context of clinical and histopathologic findings. .    Prior Treatment: Dasatinib 100mg PO daily, started 9/29/2020 - mid Oct 2020 (discontinued for mood changes, fluid retention). Current Treatment: Gleevec 200mg PO daily     History of Present Illness:   Melissa Saravia is a [de-identified] y.o. female with HTN, Hyperlipidemia, allergic rhinitis, GERD comes in for follow up of CML. Per PCP notes, pt has had fatigue for past 4 months.  Recently was treated with UTI in late June 2020 treated with Amoxicillin. She also developed a rash in late June, which was treated steroid ointment, but no oral Prednisone. Dermatologist felt this was a heat rash. She does endorse yeast type rash under breast or in groin which comes and goes, not new. No unintentional weight loss, good appetite. No fevers, chills, night sweats. Had a recent chest CT which was negative for PE. Back in Feb 2020, she felt severely exhausted for 1 week, and the recovered. She wondered if she had COVID19 virus. Outside labs:   1/21/20: WBC 10.6, Hgb 13.8, MCV 92,   7/20/20: WBC 20.5, Hgb 13, MCV 91,   7/27/20: WBC 17.3, Hgb 12.4, MCV 95,     After initially being on Dasatinib for 2 weeks, she states felt terrible - was angry at everyone, felt crazy, felt like having a persistent hot flash. When asked to elaborate on feeling crazy, she denies hallucinations or confusing, but does endorse mood problems while on the medication. She also had weight gain and leg swelling. She stopped Dasatinib at the last visit, started on Lasix daily. Gradually over the past 2 weeks, the leg swelling improved and she feels improved. Cardiac workup with EKG, echo negative. CXR did show pleural effusions and dopplers negative for DVT. Interval History:  Patient is seen for follow up up of CML, currently on Gleevec 200mg daily. Pt states she is taking Gleevec consistently without interruption. Manages her intermittent loose stools. No recent infections. Has some fatigue. Denies edema, SOB, CP. Denies pruritis. Has stable rash on her face which is nonpruritic. Not taking Tums or antacid. PMHx: HTN  PSurgHx: Appendectomy, bilateral hip replacement, total hysterectomy for uterine prolapse in 2006 with pathology showing squamous metaplasia of cervix. SHx: Former smoker, No EtOH. . FHx: No blood disorders per patient.      Review of Systems: A complete review of systems was obtained, negative except as described above.    Physical Exam:     There were no vitals taken for this visit. ECOG PS: 1  General: Well developed, no acute distress  Neck: Supple  Lymphatic: No cervical, supraclavicular adenopathy  Respiratory: CTAB, normal respiratory effort  CV: Normal rate, regular rhythm, no murmurs, no peripheral edema. GI: Soft, nontender, nondistended, no masses  Skin: No rashes, ecchymosis. Neuro/Psych: Alert, oriented. Moving all 4 extremities. Appropriate affect, normal judgment/insight. Results:     Lab Results   Component Value Date/Time    WBC 4.6 11/21/2022 11:05 AM    HGB 11.4 11/21/2022 11:05 AM    HCT 34.0 11/21/2022 11:05 AM    PLATELET 194 80/66/0870 11:05 AM    MCV 96 11/21/2022 11:05 AM    ABS. NEUTROPHILS 3.0 11/21/2022 11:05 AM     Lab Results   Component Value Date/Time    Sodium 142 11/21/2022 11:05 AM    Potassium 4.8 11/21/2022 11:05 AM    Chloride 105 11/21/2022 11:05 AM    CO2 25 11/21/2022 11:05 AM    Glucose 129 (H) 11/21/2022 11:05 AM    BUN 12 11/21/2022 11:05 AM    Creatinine 0.62 11/21/2022 11:05 AM    GFR est AA >60 05/02/2022 12:10 PM    GFR est non-AA >60 05/02/2022 12:10 PM    Calcium 9.4 11/21/2022 11:05 AM     Lab Results   Component Value Date/Time    Bilirubin, total 0.2 11/21/2022 11:05 AM    ALT (SGPT) 20 11/21/2022 11:05 AM    Alk.  phosphatase 54 11/21/2022 11:05 AM    Protein, total 6.4 11/21/2022 11:05 AM    Albumin 4.6 11/21/2022 11:05 AM    Globulin 2.7 05/02/2022 12:10 PM     No results found for: IRON, FE, TIBC, IBCT, PSAT, FERR    No results found for: B12LT, FOL, RBCF  No results found for: TSH, TSH2, TSH3, TSHP, TSHEXT, TSHEXT  No results found for: HAMAT, HAAB, HABT, HAAT, HBSAG, HBSB, HBSAT, HBABN, HBCM, HBCAB, HBCAT, XBCABS, [address], HBEAG, XHEPCS, [id number], [facility], On license of [facility], HBCLT, [address], [id number], [id number], [address] [place] [address], [id number], HBCMLT, [id number], HCGAT     8/20/20: POSITIVE for the BCR-ABL1:  e13a2 (b2a2, p210) 30.77; e14a2 (b3a2, p210) 56.03; e1a2 (p190) 0.03 fusion transcripts. 21: POSITIVE for the BCR-ABL1:  e13a2 (b2a2, p210) 13.2; e14a2 (b3a2, p210) 34.4; e1a2 (p190) 0.0175 fusion transcripts. 21: POSITIVE for the BCR-ABL1:  e13a2 (b2a2, p210) 0.0614 and e14a2 (b3a2, p210) 0.4772 fusion transcripts. 21: POSITIVE for the BCR-ABL1:  e13a2 (b2a2, p210) 0.1481 and e14a2 (b3a2, p210) 1.4140 fusion transcripts. 21: POSITIVE for the BCR-ABL1:  e13a2 (b2a2, p210) 0.0381 and e14a2 (b3a2, p210) 0.3430 fusion transcripts. 22: POSITIVE for the BCR-ABL1:  e13a2 (b2a2, p210) 0.1742 and e14a2 (b3a2, p210) 5.3332 fusion transcripts. 22: POSITIVE for the BCR-ABL1:  e13a2 (b2a2, p210) 0.1770 and e14a2 (b3a2, p210) 1.9828 fusion transcripts. 8/15/22 POSITIVE for the BCR-ABL1:  e13a2 (b2a2, p210) < 0.0032% and e14a2 (b3a2, p210) 0.6344 fusion transcripts    22 POSITIVE for BCR-ABL1:  e13a2 (b2a2, p210)  0.4064 and e14a2 (b3a2, p210) 2.9292      Imagin/28/20 abdomen ultrasound:  IMPRESSION: Hepatic steatosis. No evidence for hepatosplenomegaly    Assessment & Plan:   Aleksandr Tse is a [de-identified] y.o. female is seen for evaluation of leukocytosis. 1. CML: Sokal and Euro score intermediate risk. Discussed with patient that this a slow growing leukemia and can be effectively treated with an oral medication in the tyrosine kinase inhibitor class of meds such as Imatinib, Dasatinib, etc. Side effects of these medications include nausea, diarrhea, constipation, rash, liver test abnormalities, most of which are generally troublesome in the first 1-2 months, but then can resolve or improve over time. Previously printed patient information on CML for patient. Recent BCR-ABL1 transcripts are increasing compared to prior, but normal WBC. Will continue current treatment since higher dose not tolerated. -- Continue Gleevec 200mg daily and continue this dosing consistently.    -- Labs (CBC, CMP, bcr-abl) on 23 - will review at next visit  -- If rash worsens, will continue low dose topical steroid first prior to restarting Pred 10mg/d  -- Return in 12 weeks with labs 2 weeks prior, MD visit. -- Had both COVID vaccines. 2. H/o Pleural effusions / Pulm edema / LE edema:  Imatinib can cause peripheral edema in up to 10% of cases. Cardiac workup was negative for ischemia. Prior echo showed EF 60-65% and pt may have diastolic dysfunction. Dr. Tita Christine ordered Thor [place] nuclear stress test but pt has not completed. No longer on Lasix. 3. HTN:   Better reading with manual cuff. Lisinopril/HCTZ 40-25mg daily. No CP or SOB today. Managed by Dr. Tita Christine. 4. Health maintenance: Last colonoscopy done 2016. Last mammogram 5/21/19. No further mammograms needed unless symptomatic. 5. Erythematous rash:   Mild and present on face, arms, chest. Manageable on current dose of Gleevec. I appreciate the opportunity to participate in Ms. Andrew Archibald care.     CC: Dr. Mansi Wilson        Signed By: Meryl Naqvi MD     December 5, 2022

## 2022-12-05 ENCOUNTER — OFFICE VISIT (OUTPATIENT)
Dept: ONCOLOGY | Age: 82
End: 2022-12-05
Payer: MEDICARE

## 2022-12-05 VITALS
DIASTOLIC BLOOD PRESSURE: 79 MMHG | WEIGHT: 146.2 LBS | OXYGEN SATURATION: 97 % | BODY MASS INDEX: 27.6 KG/M2 | TEMPERATURE: 98 F | SYSTOLIC BLOOD PRESSURE: 154 MMHG | HEIGHT: 61 IN | HEART RATE: 69 BPM

## 2022-12-05 DIAGNOSIS — Z51.81 ENCOUNTER FOR MONITORING GLEEVEC THERAPY: ICD-10-CM

## 2022-12-05 DIAGNOSIS — R21 RASH: ICD-10-CM

## 2022-12-05 DIAGNOSIS — Z79.899 ENCOUNTER FOR MONITORING GLEEVEC THERAPY: ICD-10-CM

## 2022-12-05 DIAGNOSIS — I10 ESSENTIAL HYPERTENSION: ICD-10-CM

## 2022-12-05 DIAGNOSIS — C92.10 CML (CHRONIC MYELOCYTIC LEUKEMIA) (HCC): Primary | ICD-10-CM

## 2022-12-05 PROCEDURE — G8400 PT W/DXA NO RESULTS DOC: HCPCS | Performed by: INTERNAL MEDICINE

## 2022-12-05 PROCEDURE — G8753 SYS BP > OR = 140: HCPCS | Performed by: INTERNAL MEDICINE

## 2022-12-05 PROCEDURE — G8536 NO DOC ELDER MAL SCRN: HCPCS | Performed by: INTERNAL MEDICINE

## 2022-12-05 PROCEDURE — 3074F SYST BP LT 130 MM HG: CPT | Performed by: INTERNAL MEDICINE

## 2022-12-05 PROCEDURE — 3078F DIAST BP <80 MM HG: CPT | Performed by: INTERNAL MEDICINE

## 2022-12-05 PROCEDURE — G8417 CALC BMI ABV UP PARAM F/U: HCPCS | Performed by: INTERNAL MEDICINE

## 2022-12-05 PROCEDURE — 1101F PT FALLS ASSESS-DOCD LE1/YR: CPT | Performed by: INTERNAL MEDICINE

## 2022-12-05 PROCEDURE — G8432 DEP SCR NOT DOC, RNG: HCPCS | Performed by: INTERNAL MEDICINE

## 2022-12-05 PROCEDURE — 1090F PRES/ABSN URINE INCON ASSESS: CPT | Performed by: INTERNAL MEDICINE

## 2022-12-05 PROCEDURE — G8427 DOCREV CUR MEDS BY ELIG CLIN: HCPCS | Performed by: INTERNAL MEDICINE

## 2022-12-05 PROCEDURE — G8754 DIAS BP LESS 90: HCPCS | Performed by: INTERNAL MEDICINE

## 2022-12-05 PROCEDURE — 99215 OFFICE O/P EST HI 40 MIN: CPT | Performed by: INTERNAL MEDICINE

## 2022-12-05 PROCEDURE — 1123F ACP DISCUSS/DSCN MKR DOCD: CPT | Performed by: INTERNAL MEDICINE

## 2022-12-05 NOTE — PROGRESS NOTES
Freddy Willis is a [de-identified] y.o. female here for follow up of Chronic myelogenous leukemia (CML). Patient with complaints of generalized pain, rates as a 1 out of 10.

## 2023-02-03 ENCOUNTER — TELEPHONE (OUTPATIENT)
Dept: ONCOLOGY | Age: 83
End: 2023-02-03

## 2023-02-03 NOTE — TELEPHONE ENCOUNTER
Patient's  called stated he needs a call back about wife's medication. He stated that the medication went from 20+ dollars a month to over 300 dollars a month.  He called the insurance and they stated it requires an authorization from the MD. Please call them back when possible to assist.    # [phone]

## 2023-02-15 DIAGNOSIS — C92.10 CML (CHRONIC MYELOCYTIC LEUKEMIA) (HCC): ICD-10-CM

## 2023-02-15 RX ORDER — IMATINIB MESYLATE 100 MG/1
200 TABLET, FILM COATED ORAL DAILY
Qty: 60 TABLET | Refills: 3 | Status: SHIPPED | OUTPATIENT
Start: 2023-02-15

## 2023-02-22 NOTE — PROGRESS NOTES
[address] [facility] at [address] [place] [address]  [phone]    Hematology / Oncology Established Visit    Reason for Visit:   Christian Willard is a [de-identified] y.o. female who is seen for follow up of CML. PCP is Dr. Remedios Alcazar.     Hematology Oncology Treatment History:     Diagnosis: Chronic myelogenous leukemia (CML)    Stage: N/A    Pathology:   - 8/1/20 Peripheral blood BCR-ABL1 FISH: 84% of nuclei positive for BCR/ABL1 fusion    -8/28/20 Bone marrow biopsy: Chronic myeloid leukemia, BCR-ABL1-positive, chronic phase   The bone marrow biopsy is 90% cellular and adequate for evaluation. The myeloid series appears hyperplastic and left-shifted with increased proportion of promyelocytes and myelocytes. Blasts do not appear increased. The erythroid series is reduced in number but progresses through the full maturation sequence without significant dysplasia. Megakaryocytes are increased in number with dwarf forms seen. Plasma cells and lymphocytes do not appear increased in number.   -Cytogenetics: Karyotype: 46,XX,t(9;22)(q34.1;q11.2)[20]; Interpretation: ABNORMAL FEMALE KARYOTYPE   The t(9;22)(q34.1;q11.2), resulting in formation of the [place] chromosome, is observed in 90-95% of cases with chronic myeloid leukemia, BCR-ABL1 positive. It is also observed in acute lymphoblastic leukemia/lymphoma and mixed phenotype acute leukemia (MPAL) with t(9;22)(q34.1;q11.2);BCR-ABL1 and in 1-2% of AML. These results should be nterpreted in the context of clinical and histopathologic findings. .    Prior Treatment: Dasatinib 100mg PO daily, started 9/29/2020 - mid Oct 2020 (discontinued for mood changes, fluid retention). Current Treatment: Gleevec 200mg PO daily     History of Present Illness:   Christian Willard is a [de-identified] y.o. female with HTN, Hyperlipidemia, allergic rhinitis, GERD comes in for follow up of CML. Per PCP notes, pt has had fatigue for past 4 months.  Recently was treated with UTI in late June 2020 treated with Amoxicillin. She also developed a rash in late June, which was treated steroid ointment, but no oral Prednisone. Dermatologist felt this was a heat rash. She does endorse yeast type rash under breast or in groin which comes and goes, not new. No unintentional weight loss, good appetite. No fevers, chills, night sweats. Had a recent chest CT which was negative for PE. Back in Feb 2020, she felt severely exhausted for 1 week, and the recovered. She wondered if she had COVID19 virus. Outside labs:   1/21/20: WBC 10.6, Hgb 13.8, MCV 92,   7/20/20: WBC 20.5, Hgb 13, MCV 91,   7/27/20: WBC 17.3, Hgb 12.4, MCV 95,     After initially being on Dasatinib for 2 weeks, she states felt terrible - was angry at everyone, felt crazy, felt like having a persistent hot flash. When asked to elaborate on feeling crazy, she denies hallucinations or confusing, but does endorse mood problems while on the medication. She also had weight gain and leg swelling. She stopped Dasatinib at the last visit, started on Lasix daily. Gradually over the past 2 weeks, the leg swelling improved and she feels improved. Cardiac workup with EKG, echo negative. CXR did show pleural effusions and dopplers negative for DVT. Interval History:  Patient is seen for follow up up of CML, currently on Gleevec 200mg daily. Pt states she is taking Gleevec consistently without interruption. Had some issues with cost of Gleevec, which has been resolved. Manages her intermittent loose stools. No recent infections. Has some fatigue. Denies edema, SOB, CP. Denies pruritis. Has stable rash on her face which is nonpruritic. Not taking Tums or antacid. PMHx: HTN  PSurgHx: Appendectomy, bilateral hip replacement, total hysterectomy for uterine prolapse in 2006 with pathology showing squamous metaplasia of cervix. SHx: Former smoker, No EtOH. . FHx: No blood disorders per patient.      Review of Systems: A complete review of systems was obtained, negative except as described above. Physical Exam:     Visit Vitals  /72 (BP 1 Location: Right upper arm, BP Patient Position: Sitting, BP Cuff Size: Large adult)   Pulse (!) 57   Temp 97.3 °F (36.3 °C) (Oral)   Resp 18   Ht 5' 1\" (1.549 m)   Wt 147 lb 3.2 oz (66.8 kg)   SpO2 98%   BMI 27.81 kg/m²       ECOG PS: 1  General: Well developed, no acute distress  Neck: Supple  Lymphatic: No cervical, supraclavicular adenopathy  Respiratory: CTAB, normal respiratory effort  CV: Normal rate, regular rhythm, no murmurs, no peripheral edema. GI: Soft, nontender, nondistended, no masses  Skin: No rashes, ecchymosis. Neuro/Psych: Alert, oriented. Moving all 4 extremities. Appropriate affect, normal judgment/insight. Results:     Lab Results   Component Value Date/Time    WBC 6.9 02/13/2023 03:08 PM    HGB 11.7 02/13/2023 03:08 PM    HCT 35.8 02/13/2023 03:08 PM    PLATELET 637 67/17/8542 03:08 PM    MCV 96.2 02/13/2023 03:08 PM    ABS. NEUTROPHILS 4.0 02/13/2023 03:08 PM     Lab Results   Component Value Date/Time    Sodium 138 02/13/2023 03:08 PM    Potassium 4.6 02/13/2023 03:08 PM    Chloride 106 02/13/2023 03:08 PM    CO2 28 02/13/2023 03:08 PM    Glucose 113 (H) 02/13/2023 03:08 PM    BUN 18 02/13/2023 03:08 PM    Creatinine 0.80 02/13/2023 03:08 PM    GFR est AA >60 05/02/2022 12:10 PM    GFR est non-AA >60 05/02/2022 12:10 PM    Calcium 9.6 02/13/2023 03:08 PM     Lab Results   Component Value Date/Time    Bilirubin, total 0.3 02/13/2023 03:08 PM    ALT (SGPT) 32 02/13/2023 03:08 PM    Alk.  phosphatase 56 02/13/2023 03:08 PM    Protein, total 6.9 02/13/2023 03:08 PM    Albumin 4.2 02/13/2023 03:08 PM    Globulin 2.7 02/13/2023 03:08 PM     No results found for: IRON, FE, TIBC, IBCT, PSAT, FERR    No results found for: B12LT, FOL, RBCF  No results found for: TSH, TSH2, TSH3, TSHP, TSHEXT, TSHEXT  No results found for: HAMAT, HAAB, HABT, HAAT, HBSAG, HBSB, HBSAT, HBABN, [address], [address], [address], [facility], [facility], [address] [facility] [address], [address] [facility], [id number], 1950 [facility], [address], [address] [place] [address], [id number], [id number], [facility], [id number], HBCMLT, [id number], HCGAT     20: POSITIVE for the BCR-ABL1:  e13a2 (b2a2, p210) 30.77; e14a2 (b3a2, p210) 56.03; e1a2 (p190) 0.03 fusion transcripts. 21: POSITIVE for the BCR-ABL1:  e13a2 (b2a2, p210) 13.2; e14a2 (b3a2, p210) 34.4; e1a2 (p190) 0.0175 fusion transcripts. 21: POSITIVE for the BCR-ABL1:  e13a2 (b2a2, p210) 0.0614 and e14a2 (b3a2, p210) 0.4772 fusion transcripts. 21: POSITIVE for the BCR-ABL1:  e13a2 (b2a2, p210) 0.1481 and e14a2 (b3a2, p210) 1.4140 fusion transcripts. 21: POSITIVE for the BCR-ABL1:  e13a2 (b2a2, p210) 0.0381 and e14a2 (b3a2, p210) 0.3430 fusion transcripts. 22: POSITIVE for the BCR-ABL1:  e13a2 (b2a2, p210) 0.1742 and e14a2 (b3a2, p210) 5.3332 fusion transcripts. 22: POSITIVE for the BCR-ABL1:  e13a2 (b2a2, p210) 0.1770 and e14a2 (b3a2, p210) 1.9828 fusion transcripts. 8/15/22 POSITIVE for the BCR-ABL1:  e13a2 (b2a2, p210) < 0.0032% and e14a2 (b3a2, p210) 0.6344 fusion transcripts    22 POSITIVE for BCR-ABL1:  e13a2 (b2a2, p210)  0.4064 and e14a2 (b3a2, p210) 2.9292 fusion transcripts    23 POSITIVE for the BCR-ABL1:  e13a2 (b2a2, p210) 0.0592 and e14a2 (b3a2, p210) 0.2734 fusion transcripts          Imagin/28/20 abdomen ultrasound:  IMPRESSION: Hepatic steatosis. No evidence for hepatosplenomegaly    Assessment & Plan:   Blessing Collins is a [de-identified] y.o. female is seen for evaluation of leukocytosis. 1. CML: Sokal and Euro score intermediate risk.  Discussed with patient that this a slow growing leukemia and can be effectively treated with an oral medication in the tyrosine kinase inhibitor class of meds such as Imatinib, Dasatinib, etc. Side effects of these medications include nausea, diarrhea, constipation, rash, liver test abnormalities, most of which are generally troublesome in the first 1-2 months, but then can resolve or improve over time. Previously printed patient information on CML for patient. Labs from 2/13/23 show declining BCR-ABL1 transcripts close to goal of 0.1% with normal WBC. Will continue current treatment since higher dose not tolerated. -- Continue Gleevec 200mg daily and continue this dosing consistently. -- Labs (CBC, CMP, bcr-abl) on 5/11/23 - will review at next visit  -- If rash worsens, will continue low dose topical steroid first prior to restarting Pred 10mg/d  -- Return in 12 weeks with labs 2 weeks prior, MD visit. 2. H/o Pleural effusions / Pulm edema / LE edema:  Imatinib can cause peripheral edema in up to 10% of cases. Cardiac workup was negative for ischemia. Prior echo showed EF 60-65% and pt may have diastolic dysfunction. Dr. Lindsay Giordano ordered [place] nuclear stress test but pt has not completed. No longer on Lasix. 3. HTN:   Well controlled on Lisinopril/HCTZ 40-25mg daily. No CP or SOB today. Managed by Dr. Lindsay Giordano. 4. Health maintenance:   Last colonoscopy done 2016. Last mammogram 5/21/19. No further mammograms needed unless symptomatic. Had both COVID vaccines. 5. Erythematous rash:   Mild and present on face, arms, chest. Manageable on current dose of Gleevec. I appreciate the opportunity to participate in Ms. Racheal oconnor. CC: Dr. Candia Fleischer personally provided the service today.      Signed By: Gamaliel Tomlin MD     March 2, 2023

## 2023-03-02 ENCOUNTER — OFFICE VISIT (OUTPATIENT)
Dept: ONCOLOGY | Age: 83
End: 2023-03-02
Payer: MEDICARE

## 2023-03-02 VITALS
RESPIRATION RATE: 18 BRPM | SYSTOLIC BLOOD PRESSURE: 117 MMHG | HEIGHT: 61 IN | TEMPERATURE: 97.3 F | WEIGHT: 147.2 LBS | OXYGEN SATURATION: 98 % | BODY MASS INDEX: 27.79 KG/M2 | HEART RATE: 57 BPM | DIASTOLIC BLOOD PRESSURE: 72 MMHG

## 2023-03-02 DIAGNOSIS — R21 RASH: ICD-10-CM

## 2023-03-02 DIAGNOSIS — Z79.899 ENCOUNTER FOR MONITORING GLEEVEC THERAPY: ICD-10-CM

## 2023-03-02 DIAGNOSIS — C92.10 CML (CHRONIC MYELOCYTIC LEUKEMIA) (HCC): Primary | ICD-10-CM

## 2023-03-02 DIAGNOSIS — Z51.81 ENCOUNTER FOR MONITORING GLEEVEC THERAPY: ICD-10-CM

## 2023-03-02 DIAGNOSIS — I10 ESSENTIAL HYPERTENSION: ICD-10-CM

## 2023-03-02 PROCEDURE — 1101F PT FALLS ASSESS-DOCD LE1/YR: CPT | Performed by: INTERNAL MEDICINE

## 2023-03-02 PROCEDURE — G8400 PT W/DXA NO RESULTS DOC: HCPCS | Performed by: INTERNAL MEDICINE

## 2023-03-02 PROCEDURE — G8510 SCR DEP NEG, NO PLAN REQD: HCPCS | Performed by: INTERNAL MEDICINE

## 2023-03-02 PROCEDURE — 1090F PRES/ABSN URINE INCON ASSESS: CPT | Performed by: INTERNAL MEDICINE

## 2023-03-02 PROCEDURE — 99215 OFFICE O/P EST HI 40 MIN: CPT | Performed by: INTERNAL MEDICINE

## 2023-03-02 PROCEDURE — G8427 DOCREV CUR MEDS BY ELIG CLIN: HCPCS | Performed by: INTERNAL MEDICINE

## 2023-03-02 PROCEDURE — G8536 NO DOC ELDER MAL SCRN: HCPCS | Performed by: INTERNAL MEDICINE

## 2023-03-02 PROCEDURE — 3074F SYST BP LT 130 MM HG: CPT | Performed by: INTERNAL MEDICINE

## 2023-03-02 PROCEDURE — G8417 CALC BMI ABV UP PARAM F/U: HCPCS | Performed by: INTERNAL MEDICINE

## 2023-03-02 PROCEDURE — 3078F DIAST BP <80 MM HG: CPT | Performed by: INTERNAL MEDICINE

## 2023-03-02 PROCEDURE — 1123F ACP DISCUSS/DSCN MKR DOCD: CPT | Performed by: INTERNAL MEDICINE

## 2023-03-02 NOTE — LETTER
3/2/2023    Patient: Carlitos Plunkett   YOB: 1940   Date of Visit: 3/2/2023     Natan Reed MD  [address] [facility]  [address] [place] [id number]  Via Fax: [phone]    Dear Natan Reed MD,      Thank you for referring Ms. Carlitos Plunkett to [address] [place] [address] for evaluation. My notes for this consultation are attached. If you have questions, please do not hesitate to call me. I look forward to following your patient along with you.       Sincerely,    Myranda Hayes MD

## 2023-03-02 NOTE — PROGRESS NOTES
1. Have you been to the ER, urgent care clinic since your last visit? Hospitalized since your last visit? No    2. Have you seen or consulted any other health care providers outside of the [address] [place] [address] since your last visit? Include any pap smears or colon screening.  No        Visit Vitals  /72 (BP 1 Location: Right upper arm, BP Patient Position: Sitting, BP Cuff Size: Large adult)   Pulse (!) 57   Temp 97.3 °F (36.3 °C) (Oral)   Resp 18   Ht 5' 1\" (1.549 m)   Wt 147 lb 3.2 oz (66.8 kg)   SpO2 98%   BMI 27.81 kg/m²            Chief Complaint   Patient presents with    Follow-up     cml

## 2023-04-12 ENCOUNTER — APPOINTMENT (OUTPATIENT)
Dept: CT IMAGING | Age: 83
DRG: 690 | End: 2023-04-12
Attending: EMERGENCY MEDICINE
Payer: MEDICARE

## 2023-04-12 ENCOUNTER — APPOINTMENT (OUTPATIENT)
Dept: GENERAL RADIOLOGY | Age: 83
DRG: 690 | End: 2023-04-12
Attending: EMERGENCY MEDICINE
Payer: MEDICARE

## 2023-04-12 ENCOUNTER — HOSPITAL ENCOUNTER (EMERGENCY)
Age: 83
Discharge: HOME | DRG: 690 | End: 2023-04-12
Attending: EMERGENCY MEDICINE
Payer: MEDICARE

## 2023-04-12 VITALS
RESPIRATION RATE: 17 BRPM | HEART RATE: 78 BPM | BODY MASS INDEX: 27.75 KG/M2 | TEMPERATURE: 100.6 F | WEIGHT: 147 LBS | SYSTOLIC BLOOD PRESSURE: 148 MMHG | DIASTOLIC BLOOD PRESSURE: 54 MMHG | HEIGHT: 61 IN | OXYGEN SATURATION: 95 %

## 2023-04-12 DIAGNOSIS — N12 PYELONEPHRITIS OF RIGHT KIDNEY: Primary | ICD-10-CM

## 2023-04-12 LAB
ALBUMIN SERPL-MCNC: 2.8 G/DL (ref 3.5–5)
ALBUMIN/GLOB SERPL: 0.7 (ref 1.1–2.2)
ALP SERPL-CCNC: 109 U/L (ref 45–117)
ALT SERPL-CCNC: 66 U/L (ref 12–78)
ANION GAP SERPL CALC-SCNC: 5 MMOL/L (ref 5–15)
APPEARANCE UR: CLEAR
AST SERPL-CCNC: 60 U/L (ref 15–37)
BACTERIA URNS QL MICRO: ABNORMAL /HPF
BASOPHILS # BLD: 0 K/UL (ref 0–0.1)
BASOPHILS NFR BLD: 0 % (ref 0–1)
BILIRUB SERPL-MCNC: 0.5 MG/DL (ref 0.2–1)
BILIRUB UR QL: NEGATIVE
BUN SERPL-MCNC: 26 MG/DL (ref 6–20)
BUN/CREAT SERPL: 25 (ref 12–20)
CALCIUM SERPL-MCNC: 8.6 MG/DL (ref 8.5–10.1)
CHLORIDE SERPL-SCNC: 101 MMOL/L (ref 97–108)
CO2 SERPL-SCNC: 26 MMOL/L (ref 21–32)
COLOR UR: ABNORMAL
COMMENT, HOLDF: NORMAL
CREAT SERPL-MCNC: 1.04 MG/DL (ref 0.55–1.02)
DIFFERENTIAL METHOD BLD: ABNORMAL
EOSINOPHIL # BLD: 0.1 K/UL (ref 0–0.4)
EOSINOPHIL NFR BLD: 1 % (ref 0–7)
EPITH CASTS URNS QL MICRO: ABNORMAL /LPF
ERYTHROCYTE [DISTWIDTH] IN BLOOD BY AUTOMATED COUNT: 14.6 % (ref 11.5–14.5)
GLOBULIN SER CALC-MCNC: 4 G/DL (ref 2–4)
GLUCOSE SERPL-MCNC: 168 MG/DL (ref 65–100)
GLUCOSE UR STRIP.AUTO-MCNC: NEGATIVE MG/DL
HCT VFR BLD AUTO: 30.6 % (ref 35–47)
HGB BLD-MCNC: 10.5 G/DL (ref 11.5–16)
HGB UR QL STRIP: ABNORMAL
IMM GRANULOCYTES # BLD AUTO: 0.1 K/UL (ref 0–0.04)
IMM GRANULOCYTES NFR BLD AUTO: 1 % (ref 0–0.5)
KETONES UR QL STRIP.AUTO: NEGATIVE MG/DL
LACTATE BLD-SCNC: 1.54 MMOL/L (ref 0.4–2)
LEUKOCYTE ESTERASE UR QL STRIP.AUTO: ABNORMAL
LYMPHOCYTES # BLD: 0.4 K/UL (ref 0.8–3.5)
LYMPHOCYTES NFR BLD: 3 % (ref 12–49)
MCH RBC QN AUTO: 31.4 PG (ref 26–34)
MCHC RBC AUTO-ENTMCNC: 34.3 G/DL (ref 30–36.5)
MCV RBC AUTO: 91.6 FL (ref 80–99)
MONOCYTES # BLD: 0.7 K/UL (ref 0–1)
MONOCYTES NFR BLD: 5 % (ref 5–13)
NEUTS SEG # BLD: 13 K/UL (ref 1.8–8)
NEUTS SEG NFR BLD: 90 % (ref 32–75)
NITRITE UR QL STRIP.AUTO: NEGATIVE
NRBC # BLD: 0 K/UL (ref 0–0.01)
NRBC BLD-RTO: 0 PER 100 WBC
PH UR STRIP: 5.5 (ref 5–8)
PLATELET # BLD AUTO: 348 K/UL (ref 150–400)
PMV BLD AUTO: 10.3 FL (ref 8.9–12.9)
POTASSIUM SERPL-SCNC: 3.2 MMOL/L (ref 3.5–5.1)
PROT SERPL-MCNC: 6.8 G/DL (ref 6.4–8.2)
PROT UR STRIP-MCNC: 100 MG/DL
RBC # BLD AUTO: 3.34 M/UL (ref 3.8–5.2)
RBC #/AREA URNS HPF: ABNORMAL /HPF (ref 0–5)
RBC MORPH BLD: ABNORMAL
SAMPLES BEING HELD,HOLD: NORMAL
SODIUM SERPL-SCNC: 132 MMOL/L (ref 136–145)
SP GR UR REFRACTOMETRY: 1.01 (ref 1–1.03)
UR CULT HOLD, URHOLD: NORMAL
UROBILINOGEN UR QL STRIP.AUTO: 1 EU/DL (ref 0.2–1)
WBC # BLD AUTO: 14.3 K/UL (ref 3.6–11)
WBC URNS QL MICRO: ABNORMAL /HPF (ref 0–4)

## 2023-04-12 PROCEDURE — 74011250637 HC RX REV CODE- 250/637: Performed by: EMERGENCY MEDICINE

## 2023-04-12 PROCEDURE — 74011250636 HC RX REV CODE- 250/636: Performed by: EMERGENCY MEDICINE

## 2023-04-12 PROCEDURE — 85025 COMPLETE CBC W/AUTO DIFF WBC: CPT

## 2023-04-12 PROCEDURE — 71045 X-RAY EXAM CHEST 1 VIEW: CPT

## 2023-04-12 PROCEDURE — 80053 COMPREHEN METABOLIC PANEL: CPT

## 2023-04-12 PROCEDURE — 96374 THER/PROPH/DIAG INJ IV PUSH: CPT

## 2023-04-12 PROCEDURE — 87040 BLOOD CULTURE FOR BACTERIA: CPT

## 2023-04-12 PROCEDURE — 87086 URINE CULTURE/COLONY COUNT: CPT

## 2023-04-12 PROCEDURE — 81001 URINALYSIS AUTO W/SCOPE: CPT

## 2023-04-12 PROCEDURE — 36415 COLL VENOUS BLD VENIPUNCTURE: CPT

## 2023-04-12 PROCEDURE — 87186 SC STD MICRODIL/AGAR DIL: CPT

## 2023-04-12 PROCEDURE — 99285 EMERGENCY DEPT VISIT HI MDM: CPT

## 2023-04-12 PROCEDURE — 74177 CT ABD & PELVIS W/CONTRAST: CPT

## 2023-04-12 PROCEDURE — 74011000250 HC RX REV CODE- 250: Performed by: EMERGENCY MEDICINE

## 2023-04-12 PROCEDURE — 83605 ASSAY OF LACTIC ACID: CPT

## 2023-04-12 PROCEDURE — 74011000636 HC RX REV CODE- 636: Performed by: EMERGENCY MEDICINE

## 2023-04-12 PROCEDURE — 87150 DNA/RNA AMPLIFIED PROBE: CPT

## 2023-04-12 PROCEDURE — 87077 CULTURE AEROBIC IDENTIFY: CPT

## 2023-04-12 RX ORDER — ACETAMINOPHEN 500 MG
1000 TABLET ORAL ONCE
Status: COMPLETED | OUTPATIENT
Start: 2023-04-12 | End: 2023-04-12

## 2023-04-12 RX ORDER — CEPHALEXIN 500 MG/1
500 CAPSULE ORAL 3 TIMES DAILY
Qty: 30 CAPSULE | Refills: 0 | Status: SHIPPED | OUTPATIENT
Start: 2023-04-12 | End: 2023-04-18

## 2023-04-12 RX ADMIN — IOPAMIDOL 100 ML: 755 INJECTION, SOLUTION INTRAVENOUS at 21:21

## 2023-04-12 RX ADMIN — SODIUM CHLORIDE 1 G: 9 INJECTION INTRAMUSCULAR; INTRAVENOUS; SUBCUTANEOUS at 22:23

## 2023-04-12 RX ADMIN — ACETAMINOPHEN 1000 MG: 500 TABLET ORAL at 18:26

## 2023-04-12 NOTE — ED NOTES
Pt reports she does not feel like she can urinate at this time but will call out when she feels like she can provide a sample.

## 2023-04-12 NOTE — ED NOTES
Bedside and Verbal shift change report given to Quinn Bennett (oncoming nurse) by Vidya Estrada (offgoing nurse). Report included the following information SBAR, ED Summary, MAR and Recent Results.

## 2023-04-12 NOTE — ED PROVIDER NOTES
The history is provided by the patient, a relative and the spouse. No  was used. Fever   This is a new problem. The current episode started 6 to 12 hours ago. The problem occurs constantly. The problem has not changed since onset. Associated symptoms include urinary symptoms. Pertinent negatives include no chest pain, no sleepiness, no diarrhea, no vomiting, no congestion, no headaches, no sore throat, no muscle aches, no cough, no shortness of breath, no mental status change, no neck pain and no rash. She has tried acetaminophen for the symptoms. The treatment provided significant relief. Past Medical History:   Diagnosis Date    Hypertension        Past Surgical History:   Procedure Laterality Date    HX HEENT      Appendectomy    HX ORTHOPAEDIC      Bilateral hip replacement         No family history on file. Social History     Socioeconomic History    Marital status:      Spouse name: Not on file    Number of children: Not on file    Years of education: Not on file    Highest education level: Not on file   Occupational History    Not on file   Tobacco Use    Smoking status: Former    Smokeless tobacco: Never   Vaping Use    Vaping Use: Never used   Substance and Sexual Activity    Alcohol use: Not Currently    Drug use: Never    Sexual activity: Not Currently   Other Topics Concern    Not on file   Social History Narrative    Not on file     Social Determinants of Health     Financial Resource Strain: Not on file   Food Insecurity: Not on file   Transportation Needs: Not on file   Physical Activity: Not on file   Stress: Not on file   Social Connections: Not on file   Intimate Partner Violence: Not on file   Housing Stability: Not on file         ALLERGIES: Macrobid [nitrofurantoin monohyd/m-cryst]    Review of Systems   Constitutional:  Positive for chills and fever. Negative for activity change.    HENT:  Negative for congestion, nosebleeds, sore throat, trouble swallowing and voice change. Eyes:  Negative for visual disturbance. Respiratory:  Negative for cough and shortness of breath. Cardiovascular:  Negative for chest pain and palpitations. Gastrointestinal:  Negative for abdominal pain, constipation, diarrhea, nausea and vomiting. Genitourinary:  Positive for flank pain. Negative for difficulty urinating, dysuria, hematuria and urgency. Musculoskeletal:  Negative for back pain, neck pain and neck stiffness. Skin:  Negative for color change and rash. Allergic/Immunologic: Negative for immunocompromised state. Neurological:  Negative for dizziness, seizures, syncope, weakness, light-headedness, numbness and headaches. Psychiatric/Behavioral:  Negative for behavioral problems, confusion, hallucinations, self-injury and suicidal ideas. Vitals:    04/12/23 1755   BP: (!) 157/75   Pulse: (!) 102   Resp: 18   Temp: (!) 100.6 °F (38.1 °C)   SpO2: 98%            Physical Exam  Vitals and nursing note reviewed. Constitutional:       General: She is not in acute distress. Appearance: She is well-developed. She is not diaphoretic. HENT:      Head: Atraumatic. Neck:      Trachea: No tracheal deviation. Cardiovascular:      Comments: Warm and well perfused  Pulmonary:      Effort: Pulmonary effort is normal. No respiratory distress. Musculoskeletal:         General: Normal range of motion. Skin:     General: Skin is warm and dry. Neurological:      Mental Status: She is alert. Coordination: Coordination normal.   Psychiatric:         Behavior: Behavior normal.         Thought Content: Thought content normal.         Judgment: Judgment normal.        Medical Decision Making  Amount and/or Complexity of Data Reviewed  Labs: ordered. Radiology: ordered. Risk  OTC drugs. Prescription drug management.        This is an [de-identified] female with past medical history, review of systems, physical exam as above, presenting with complaints of fever, flank pain. Patient states 2 days worth of symptoms, notes that a UA performed by her primary care physician was suggestive of urinary tract infection, and started on Macrobid 2 days ago, (despite an allergy). She notes no improvement in her symptoms, stating \"I do not feel well\", febrile, mildly tachycardic, without complaints of shortness of breath, nausea or vomiting, diarrhea or constipation. She has been taking Tylenol for fevers, without taking any today. She has a soft nontender abdomen, clear breath sounds to auscultation, regular rate and rhythm without murmurs gallops or rubs. Differential includes infected stone, pyelonephritis, UTI, myalgia, viral syndrome. Plan to provide Tylenol, obtain CMP, CBC, UA, chest x-ray, lactic acid and blood cultures. CT scan of the abdomen and pelvis. We will reassess, and make a disposition. Procedures    SIGN OUT:  8:30 PM  Discussed pt's hx, disposition, and available diagnostic and imaging results with Dr. Edgardo Thomas. Reviewed care plans. Both providers and patient are in agreement with care plan. Dr. Sherman Morris is transferring care of the pt to Dr. Edgardo Thomas at this time.

## 2023-04-12 NOTE — ED TRIAGE NOTES
Pt arrived to the ED via EMS for shaking and not feeling well. Pt went to her PCP for lower back pain and was diagnosed with a UTI and started on Macrobid (has taken 2 days worth). Pt febrile on arrival, reported she had a fever yesterday. Has not taken any Tylenol today.

## 2023-04-13 ENCOUNTER — HOSPITAL ENCOUNTER (INPATIENT)
Age: 83
LOS: 5 days | Discharge: HOME | DRG: 690 | End: 2023-04-18
Attending: EMERGENCY MEDICINE | Admitting: INTERNAL MEDICINE
Payer: MEDICARE

## 2023-04-13 DIAGNOSIS — C92.10 CML (CHRONIC MYELOCYTIC LEUKEMIA) (HCC): ICD-10-CM

## 2023-04-13 DIAGNOSIS — R78.81 E COLI BACTEREMIA: ICD-10-CM

## 2023-04-13 DIAGNOSIS — R10.9 FLANK PAIN: ICD-10-CM

## 2023-04-13 DIAGNOSIS — R78.81 BACTEREMIA: Primary | ICD-10-CM

## 2023-04-13 DIAGNOSIS — D63.8 ANEMIA OF CHRONIC DISEASE: ICD-10-CM

## 2023-04-13 DIAGNOSIS — Z71.89 ADVANCE CARE PLANNING: ICD-10-CM

## 2023-04-13 DIAGNOSIS — N12 PYELONEPHRITIS: ICD-10-CM

## 2023-04-13 DIAGNOSIS — R53.83 FATIGUE, UNSPECIFIED TYPE: ICD-10-CM

## 2023-04-13 DIAGNOSIS — B96.20 E COLI BACTEREMIA: ICD-10-CM

## 2023-04-13 LAB
ACC. NO. FROM MICRO ORDER, ACCP: ABNORMAL
ACINETOBACTER CALCOACETICUS-BAUMANII COMPLEX, ACBCX: NOT DETECTED
ALBUMIN SERPL-MCNC: 2.6 G/DL (ref 3.5–5)
ALBUMIN/GLOB SERPL: 0.7 (ref 1.1–2.2)
ALP SERPL-CCNC: 97 U/L (ref 45–117)
ALT SERPL-CCNC: 51 U/L (ref 12–78)
ANION GAP SERPL CALC-SCNC: 6 MMOL/L (ref 5–15)
APPEARANCE UR: CLEAR
AST SERPL-CCNC: 35 U/L (ref 15–37)
B FRAGILIS DNA BLD POS QL NAA+NON-PROBE: NOT DETECTED
BACTERIA URNS QL MICRO: NEGATIVE /HPF
BASOPHILS # BLD: 0 K/UL (ref 0–0.1)
BASOPHILS NFR BLD: 0 % (ref 0–1)
BILIRUB SERPL-MCNC: 0.4 MG/DL (ref 0.2–1)
BILIRUB UR QL: NEGATIVE
BIOFIRE COMMENT, BCIDPF: ABNORMAL
BLACTX-M ISLT/SPM QL: NOT DETECTED
BLAIMP ISLT/SPM QL: NOT DETECTED
BLAKPC BLD POS QL NAA+NON-PROBE: NOT DETECTED
BLAOXA-48-LIKE ISLT/SPM QL: NOT DETECTED
BLAVIM ISLT/SPM QL: NOT DETECTED
BUN SERPL-MCNC: 26 MG/DL (ref 6–20)
BUN/CREAT SERPL: 22 (ref 12–20)
C ALBICANS DNA BLD POS QL NAA+NON-PROBE: NOT DETECTED
C AURIS DNA BLD POS QL NAA+NON-PROBE: NOT DETECTED
C GATTII+NEOFOR DNA BLD POS QL NAA+N-PRB: NOT DETECTED
C GLABRATA DNA BLD POS QL NAA+NON-PROBE: NOT DETECTED
C KRUSEI DNA BLD POS QL NAA+NON-PROBE: NOT DETECTED
C PARAP DNA BLD POS QL NAA+NON-PROBE: NOT DETECTED
C TROPICLS DNA BLD POS QL NAA+NON-PROBE: NOT DETECTED
CALCIUM SERPL-MCNC: 8.3 MG/DL (ref 8.5–10.1)
CHLORIDE SERPL-SCNC: 103 MMOL/L (ref 97–108)
CO2 SERPL-SCNC: 24 MMOL/L (ref 21–32)
COLISTIN RES MCR-1 ISLT/SPM QL: NOT DETECTED
COLOR UR: ABNORMAL
COMMENT, HOLDF: NORMAL
CREAT SERPL-MCNC: 1.19 MG/DL (ref 0.55–1.02)
DIFFERENTIAL METHOD BLD: ABNORMAL
E CLOAC COMP DNA BLD POS NAA+NON-PROBE: NOT DETECTED
E COLI DNA BLD POS QL NAA+NON-PROBE: DETECTED
E FAECALIS DNA BLD POS QL NAA+NON-PROBE: NOT DETECTED
E FAECIUM DNA BLD POS QL NAA+NON-PROBE: NOT DETECTED
ENTEROBACTERALES DNA BLD POS NAA+N-PRB: DETECTED
EOSINOPHIL # BLD: 0.2 K/UL (ref 0–0.4)
EOSINOPHIL NFR BLD: 1 % (ref 0–7)
EPITH CASTS URNS QL MICRO: ABNORMAL /LPF
ERYTHROCYTE [DISTWIDTH] IN BLOOD BY AUTOMATED COUNT: 14.6 % (ref 11.5–14.5)
GLOBULIN SER CALC-MCNC: 3.6 G/DL (ref 2–4)
GLUCOSE SERPL-MCNC: 163 MG/DL (ref 65–100)
GLUCOSE UR STRIP.AUTO-MCNC: NEGATIVE MG/DL
GP B STREP DNA BLD POS QL NAA+NON-PROBE: NOT DETECTED
HAEM INFLU DNA BLD POS QL NAA+NON-PROBE: NOT DETECTED
HCT VFR BLD AUTO: 28.5 % (ref 35–47)
HGB BLD-MCNC: 9.9 G/DL (ref 11.5–16)
HGB UR QL STRIP: ABNORMAL
IMM GRANULOCYTES # BLD AUTO: 0.2 K/UL (ref 0–0.04)
IMM GRANULOCYTES NFR BLD AUTO: 1 % (ref 0–0.5)
K OXYTOCA DNA BLD POS QL NAA+NON-PROBE: NOT DETECTED
KETONES UR QL STRIP.AUTO: NEGATIVE MG/DL
KLEBSIELLA SP DNA BLD POS QL NAA+NON-PRB: NOT DETECTED
KLEBSIELLA SP DNA BLD POS QL NAA+NON-PRB: NOT DETECTED
L MONOCYTOG DNA BLD POS QL NAA+NON-PROBE: NOT DETECTED
LACTATE BLD-SCNC: 0.82 MMOL/L (ref 0.4–2)
LEUKOCYTE ESTERASE UR QL STRIP.AUTO: ABNORMAL
LYMPHOCYTES # BLD: 0.8 K/UL (ref 0.8–3.5)
LYMPHOCYTES NFR BLD: 5 % (ref 12–49)
MCH RBC QN AUTO: 31.7 PG (ref 26–34)
MCHC RBC AUTO-ENTMCNC: 34.7 G/DL (ref 30–36.5)
MCV RBC AUTO: 91.3 FL (ref 80–99)
MONOCYTES # BLD: 1.5 K/UL (ref 0–1)
MONOCYTES NFR BLD: 9 % (ref 5–13)
N MEN DNA BLD POS QL NAA+NON-PROBE: NOT DETECTED
NDM (CARBAPENEMASE RESISTANT GENE), NDM: NOT DETECTED
NEUTS SEG # BLD: 14.1 K/UL (ref 1.8–8)
NEUTS SEG NFR BLD: 84 % (ref 32–75)
NITRITE UR QL STRIP.AUTO: NEGATIVE
NRBC # BLD: 0 K/UL (ref 0–0.01)
NRBC BLD-RTO: 0 PER 100 WBC
P AERUGINOSA DNA BLD POS NAA+NON-PROBE: NOT DETECTED
PH UR STRIP: 5.5 (ref 5–8)
PLATELET # BLD AUTO: 338 K/UL (ref 150–400)
PMV BLD AUTO: 10.2 FL (ref 8.9–12.9)
POTASSIUM SERPL-SCNC: 3.3 MMOL/L (ref 3.5–5.1)
PROT SERPL-MCNC: 6.2 G/DL (ref 6.4–8.2)
PROT UR STRIP-MCNC: 30 MG/DL
PROTEUS SP DNA BLD POS QL NAA+NON-PROBE: NOT DETECTED
RBC # BLD AUTO: 3.12 M/UL (ref 3.8–5.2)
RBC #/AREA URNS HPF: ABNORMAL /HPF (ref 0–5)
RBC MORPH BLD: ABNORMAL
RESISTANT GENE SPACE, REGENE: ABNORMAL
S AUREUS DNA BLD POS QL NAA+NON-PROBE: NOT DETECTED
S AUREUS+CONS DNA BLD POS NAA+NON-PROBE: NOT DETECTED
S EPIDERMIDIS DNA BLD POS QL NAA+NON-PRB: NOT DETECTED
S LUGDUNENSIS DNA BLD POS QL NAA+NON-PRB: NOT DETECTED
S MALTOPHILIA DNA BLD POS QL NAA+NON-PRB: NOT DETECTED
S MARCESCENS DNA BLD POS NAA+NON-PROBE: NOT DETECTED
S PNEUM DNA BLD POS QL NAA+NON-PROBE: NOT DETECTED
S PYO DNA BLD POS QL NAA+NON-PROBE: NOT DETECTED
SALMONELLA DNA BLD POS QL NAA+NON-PROBE: NOT DETECTED
SAMPLES BEING HELD,HOLD: NORMAL
SODIUM SERPL-SCNC: 133 MMOL/L (ref 136–145)
SP GR UR REFRACTOMETRY: 1.02 (ref 1–1.03)
STREPTOCOCCUS DNA BLD POS NAA+NON-PROBE: NOT DETECTED
UR CULT HOLD, URHOLD: NORMAL
UROBILINOGEN UR QL STRIP.AUTO: 0.2 EU/DL (ref 0.2–1)
WBC # BLD AUTO: 16.8 K/UL (ref 3.6–11)
WBC URNS QL MICRO: ABNORMAL /HPF (ref 0–4)

## 2023-04-13 PROCEDURE — 74011250636 HC RX REV CODE- 250/636: Performed by: EMERGENCY MEDICINE

## 2023-04-13 PROCEDURE — 81001 URINALYSIS AUTO W/SCOPE: CPT

## 2023-04-13 PROCEDURE — 99222 1ST HOSP IP/OBS MODERATE 55: CPT | Performed by: PHYSICAL MEDICINE & REHABILITATION

## 2023-04-13 PROCEDURE — 80053 COMPREHEN METABOLIC PANEL: CPT

## 2023-04-13 PROCEDURE — 83605 ASSAY OF LACTIC ACID: CPT

## 2023-04-13 PROCEDURE — 85025 COMPLETE CBC W/AUTO DIFF WBC: CPT

## 2023-04-13 PROCEDURE — 96374 THER/PROPH/DIAG INJ IV PUSH: CPT

## 2023-04-13 PROCEDURE — 36415 COLL VENOUS BLD VENIPUNCTURE: CPT

## 2023-04-13 PROCEDURE — 74011000250 HC RX REV CODE- 250: Performed by: EMERGENCY MEDICINE

## 2023-04-13 PROCEDURE — 87086 URINE CULTURE/COLONY COUNT: CPT

## 2023-04-13 PROCEDURE — 99285 EMERGENCY DEPT VISIT HI MDM: CPT

## 2023-04-13 PROCEDURE — 65270000029 HC RM PRIVATE

## 2023-04-13 RX ORDER — SODIUM CHLORIDE 0.9 % (FLUSH) 0.9 %
5-40 SYRINGE (ML) INJECTION EVERY 8 HOURS
Status: DISCONTINUED | OUTPATIENT
Start: 2023-04-13 | End: 2023-04-18 | Stop reason: HOSPADM

## 2023-04-13 RX ORDER — SODIUM CHLORIDE 0.9 % (FLUSH) 0.9 %
5-40 SYRINGE (ML) INJECTION AS NEEDED
Status: DISCONTINUED | OUTPATIENT
Start: 2023-04-13 | End: 2023-04-18 | Stop reason: HOSPADM

## 2023-04-13 RX ORDER — LISINOPRIL AND HYDROCHLOROTHIAZIDE 12.5; 2 MG/1; MG/1
1 TABLET ORAL DAILY
COMMUNITY

## 2023-04-13 RX ORDER — ACETAMINOPHEN 650 MG/1
650 SUPPOSITORY RECTAL
Status: DISCONTINUED | OUTPATIENT
Start: 2023-04-13 | End: 2023-04-18 | Stop reason: HOSPADM

## 2023-04-13 RX ORDER — ENOXAPARIN SODIUM 100 MG/ML
40 INJECTION SUBCUTANEOUS DAILY
Status: DISCONTINUED | OUTPATIENT
Start: 2023-04-14 | End: 2023-04-18 | Stop reason: HOSPADM

## 2023-04-13 RX ORDER — TRAMADOL HYDROCHLORIDE 50 MG/1
50 TABLET ORAL
Status: DISCONTINUED | OUTPATIENT
Start: 2023-04-13 | End: 2023-04-18 | Stop reason: HOSPADM

## 2023-04-13 RX ORDER — LANOLIN ALCOHOL/MO/W.PET/CERES
5000 CREAM (GRAM) TOPICAL DAILY
Status: DISCONTINUED | OUTPATIENT
Start: 2023-04-14 | End: 2023-04-18 | Stop reason: HOSPADM

## 2023-04-13 RX ORDER — ACETAMINOPHEN 325 MG/1
650 TABLET ORAL
Status: DISCONTINUED | OUTPATIENT
Start: 2023-04-13 | End: 2023-04-18 | Stop reason: HOSPADM

## 2023-04-13 RX ORDER — ONDANSETRON 2 MG/ML
4 INJECTION INTRAMUSCULAR; INTRAVENOUS
Status: DISCONTINUED | OUTPATIENT
Start: 2023-04-13 | End: 2023-04-18 | Stop reason: HOSPADM

## 2023-04-13 RX ORDER — POLYETHYLENE GLYCOL 3350 17 G/17G
17 POWDER, FOR SOLUTION ORAL DAILY PRN
Status: DISCONTINUED | OUTPATIENT
Start: 2023-04-13 | End: 2023-04-18 | Stop reason: HOSPADM

## 2023-04-13 RX ORDER — PROMETHAZINE HYDROCHLORIDE 25 MG/1
12.5 TABLET ORAL
Status: DISCONTINUED | OUTPATIENT
Start: 2023-04-13 | End: 2023-04-18 | Stop reason: HOSPADM

## 2023-04-13 RX ADMIN — SODIUM CHLORIDE 1000 ML: 9 INJECTION, SOLUTION INTRAVENOUS at 13:59

## 2023-04-13 RX ADMIN — CEFTRIAXONE SODIUM 2 G: 2 INJECTION, POWDER, FOR SOLUTION INTRAMUSCULAR; INTRAVENOUS at 14:01

## 2023-04-13 NOTE — ED NOTES
The patient's results have been reviewed with them and/or available family. Patient was offered admission to the hospital for continued monitoring IV antibiotics but would prefer to try managing her infection symptoms as an outpatient. She received a dose dose of antibiotics in the emergency department, IV Rocephin, and was discharged on cephalexin 100 mg thrice daily for 10 days. Patient and/or family verbally conveyed their understanding and agreement of the patient's signs, symptoms, diagnosis, treatment and prognosis and additionally agree to follow up as recommended in the discharge instructions or to return to the Emergency Room should their condition change prior to their follow-up appointment. The patient/family verbally agrees with the care-plan and verbally conveys that all of their questions have been answered. The discharge instructions have also been provided to the patient and/or family with some educational information regarding the patient's diagnosis as well a list of reasons why the patient would want to return to the ER prior to their follow-up appointment, should their condition change. MEDICATIONS GIVEN:  Medications   acetaminophen (TYLENOL) tablet 1,000 mg (1,000 mg Oral Given 4/12/23 1826)   iopamidoL (ISOVUE-370) 370 mg iodine /mL (76 %) injection 100 mL (100 mL IntraVENous Given 4/12/23 2121)   cefTRIAXone (ROCEPHIN) 1 g in 0.9% sodium chloride 10 mL IV syringe (1 g IntraVENous Given 4/12/23 2223)       IMPRESSION:  1.  Pyelonephritis of right kidney

## 2023-04-13 NOTE — PROGRESS NOTES
1112 -received phone call for critical result from micro lab. Patient's blood cultures from 4/12 are growing gram-negative rods in all 4 bottles. Chart reviewed, looks like patient was seen here for a urinary tract infection. She was offered admission at the time for IV antibiotics and declined and stated she would like to try oral at home. I called patient and spoke to her and her  on the phone. Reviewed results with them and asked to return for admission for IV antibiotics.   Patient and  in agreement with plan of care and stated that they would drive to [address] [place] [address], NP

## 2023-04-14 LAB
ALBUMIN SERPL-MCNC: 2.5 G/DL (ref 3.5–5)
ALBUMIN/GLOB SERPL: 0.7 (ref 1.1–2.2)
ALP SERPL-CCNC: 92 U/L (ref 45–117)
ALT SERPL-CCNC: 53 U/L (ref 12–78)
ANION GAP SERPL CALC-SCNC: 6 MMOL/L (ref 5–15)
AST SERPL-CCNC: 41 U/L (ref 15–37)
BACTERIA SPEC CULT: NORMAL
BASOPHILS # BLD: 0 K/UL (ref 0–0.1)
BASOPHILS NFR BLD: 0 % (ref 0–1)
BILIRUB SERPL-MCNC: 0.3 MG/DL (ref 0.2–1)
BUN SERPL-MCNC: 24 MG/DL (ref 6–20)
BUN/CREAT SERPL: 24 (ref 12–20)
CALCIUM SERPL-MCNC: 8 MG/DL (ref 8.5–10.1)
CHLORIDE SERPL-SCNC: 106 MMOL/L (ref 97–108)
CO2 SERPL-SCNC: 23 MMOL/L (ref 21–32)
CREAT SERPL-MCNC: 1 MG/DL (ref 0.55–1.02)
DIFFERENTIAL METHOD BLD: ABNORMAL
EOSINOPHIL # BLD: 0.1 K/UL (ref 0–0.4)
EOSINOPHIL NFR BLD: 1 % (ref 0–7)
ERYTHROCYTE [DISTWIDTH] IN BLOOD BY AUTOMATED COUNT: 14.8 % (ref 11.5–14.5)
GLOBULIN SER CALC-MCNC: 3.6 G/DL (ref 2–4)
GLUCOSE SERPL-MCNC: 159 MG/DL (ref 65–100)
HCT VFR BLD AUTO: 28.3 % (ref 35–47)
HGB BLD-MCNC: 9.9 G/DL (ref 11.5–16)
IMM GRANULOCYTES # BLD AUTO: 0.2 K/UL (ref 0–0.04)
IMM GRANULOCYTES NFR BLD AUTO: 1 % (ref 0–0.5)
LYMPHOCYTES # BLD: 1.1 K/UL (ref 0.8–3.5)
LYMPHOCYTES NFR BLD: 8 % (ref 12–49)
MAGNESIUM SERPL-MCNC: 2.2 MG/DL (ref 1.6–2.4)
MCH RBC QN AUTO: 31.8 PG (ref 26–34)
MCHC RBC AUTO-ENTMCNC: 35 G/DL (ref 30–36.5)
MCV RBC AUTO: 91 FL (ref 80–99)
MONOCYTES # BLD: 1.6 K/UL (ref 0–1)
MONOCYTES NFR BLD: 12 % (ref 5–13)
NEUTS SEG # BLD: 10.7 K/UL (ref 1.8–8)
NEUTS SEG NFR BLD: 78 % (ref 32–75)
NRBC # BLD: 0 K/UL (ref 0–0.01)
NRBC BLD-RTO: 0 PER 100 WBC
PLATELET # BLD AUTO: 346 K/UL (ref 150–400)
PMV BLD AUTO: 9.9 FL (ref 8.9–12.9)
POTASSIUM SERPL-SCNC: 3.4 MMOL/L (ref 3.5–5.1)
PROT SERPL-MCNC: 6.1 G/DL (ref 6.4–8.2)
RBC # BLD AUTO: 3.11 M/UL (ref 3.8–5.2)
SERVICE CMNT-IMP: NORMAL
SODIUM SERPL-SCNC: 135 MMOL/L (ref 136–145)
WBC # BLD AUTO: 13.7 K/UL (ref 3.6–11)

## 2023-04-14 PROCEDURE — 74011250636 HC RX REV CODE- 250/636: Performed by: INTERNAL MEDICINE

## 2023-04-14 PROCEDURE — 80053 COMPREHEN METABOLIC PANEL: CPT

## 2023-04-14 PROCEDURE — 85025 COMPLETE CBC W/AUTO DIFF WBC: CPT

## 2023-04-14 PROCEDURE — 74011000250 HC RX REV CODE- 250: Performed by: EMERGENCY MEDICINE

## 2023-04-14 PROCEDURE — 74011000250 HC RX REV CODE- 250: Performed by: INTERNAL MEDICINE

## 2023-04-14 PROCEDURE — 99223 1ST HOSP IP/OBS HIGH 75: CPT | Performed by: INTERNAL MEDICINE

## 2023-04-14 PROCEDURE — 99233 SBSQ HOSP IP/OBS HIGH 50: CPT | Performed by: PHYSICAL MEDICINE & REHABILITATION

## 2023-04-14 PROCEDURE — 36415 COLL VENOUS BLD VENIPUNCTURE: CPT

## 2023-04-14 PROCEDURE — 83735 ASSAY OF MAGNESIUM: CPT

## 2023-04-14 PROCEDURE — 65270000029 HC RM PRIVATE

## 2023-04-14 PROCEDURE — 74011250637 HC RX REV CODE- 250/637: Performed by: INTERNAL MEDICINE

## 2023-04-14 PROCEDURE — 65270000046 HC RM TELEMETRY

## 2023-04-14 PROCEDURE — 74011250637 HC RX REV CODE- 250/637: Performed by: HOSPITALIST

## 2023-04-14 RX ORDER — IMATINIB MESYLATE 100 MG/1
200 TABLET, FILM COATED ORAL DAILY
Status: DISCONTINUED | OUTPATIENT
Start: 2023-04-14 | End: 2023-04-18 | Stop reason: HOSPADM

## 2023-04-14 RX ORDER — LOPERAMIDE HYDROCHLORIDE 2 MG/1
2 CAPSULE ORAL AS NEEDED
Status: COMPLETED | OUTPATIENT
Start: 2023-04-14 | End: 2023-04-18

## 2023-04-14 RX ORDER — HYDRALAZINE HYDROCHLORIDE 10 MG/1
10 TABLET, FILM COATED ORAL
Status: COMPLETED | OUTPATIENT
Start: 2023-04-14 | End: 2023-04-15

## 2023-04-14 RX ADMIN — HYDRALAZINE HYDROCHLORIDE 10 MG: 10 TABLET, FILM COATED ORAL at 17:05

## 2023-04-14 RX ADMIN — ACETAMINOPHEN 650 MG: 325 TABLET ORAL at 09:41

## 2023-04-14 RX ADMIN — SODIUM CHLORIDE, PRESERVATIVE FREE 10 ML: 5 INJECTION INTRAVENOUS at 13:41

## 2023-04-14 RX ADMIN — ENOXAPARIN SODIUM 40 MG: 100 INJECTION SUBCUTANEOUS at 09:41

## 2023-04-14 RX ADMIN — CYANOCOBALAMIN TAB 500 MCG 5000 MCG: 500 TAB at 09:41

## 2023-04-14 RX ADMIN — LOPERAMIDE HYDROCHLORIDE 2 MG: 2 CAPSULE ORAL at 13:41

## 2023-04-14 RX ADMIN — SODIUM CHLORIDE, PRESERVATIVE FREE 10 ML: 5 INJECTION INTRAVENOUS at 13:44

## 2023-04-14 RX ADMIN — SODIUM CHLORIDE, PRESERVATIVE FREE 10 ML: 5 INJECTION INTRAVENOUS at 22:41

## 2023-04-14 RX ADMIN — LISINOPRIL: 20 TABLET ORAL at 09:41

## 2023-04-14 RX ADMIN — SODIUM CHLORIDE 2 G: 9 INJECTION INTRAMUSCULAR; INTRAVENOUS; SUBCUTANEOUS at 12:28

## 2023-04-14 RX ADMIN — IMATINIB MESYLATE 200 MG: 100 TABLET, FILM COATED ORAL at 13:41

## 2023-04-14 RX ADMIN — SODIUM CHLORIDE, PRESERVATIVE FREE 10 ML: 5 INJECTION INTRAVENOUS at 05:43

## 2023-04-14 RX ADMIN — TRAMADOL HYDROCHLORIDE 50 MG: 50 TABLET ORAL at 01:15

## 2023-04-15 LAB
ALBUMIN SERPL-MCNC: 2.3 G/DL (ref 3.5–5)
ALBUMIN/GLOB SERPL: 0.6 (ref 1.1–2.2)
ALP SERPL-CCNC: 89 U/L (ref 45–117)
ALT SERPL-CCNC: 52 U/L (ref 12–78)
ANION GAP SERPL CALC-SCNC: 4 MMOL/L (ref 5–15)
AST SERPL-CCNC: 33 U/L (ref 15–37)
BACTERIA SPEC CULT: ABNORMAL
BACTERIA SPEC CULT: ABNORMAL
BACTERIA SPEC CULT: NORMAL
BASOPHILS # BLD: 0 K/UL (ref 0–0.1)
BASOPHILS NFR BLD: 0 % (ref 0–1)
BILIRUB SERPL-MCNC: 0.3 MG/DL (ref 0.2–1)
BUN SERPL-MCNC: 22 MG/DL (ref 6–20)
BUN/CREAT SERPL: 24 (ref 12–20)
CALCIUM SERPL-MCNC: 7.9 MG/DL (ref 8.5–10.1)
CHLORIDE SERPL-SCNC: 108 MMOL/L (ref 97–108)
CO2 SERPL-SCNC: 23 MMOL/L (ref 21–32)
CREAT SERPL-MCNC: 0.92 MG/DL (ref 0.55–1.02)
DIFFERENTIAL METHOD BLD: ABNORMAL
EOSINOPHIL # BLD: 0.1 K/UL (ref 0–0.4)
EOSINOPHIL NFR BLD: 1 % (ref 0–7)
ERYTHROCYTE [DISTWIDTH] IN BLOOD BY AUTOMATED COUNT: 14.6 % (ref 11.5–14.5)
GLOBULIN SER CALC-MCNC: 3.6 G/DL (ref 2–4)
GLUCOSE SERPL-MCNC: 138 MG/DL (ref 65–100)
HCT VFR BLD AUTO: 27.8 % (ref 35–47)
HGB BLD-MCNC: 9.6 G/DL (ref 11.5–16)
IMM GRANULOCYTES # BLD AUTO: 0.2 K/UL (ref 0–0.04)
IMM GRANULOCYTES NFR BLD AUTO: 2 % (ref 0–0.5)
LYMPHOCYTES # BLD: 1.3 K/UL (ref 0.8–3.5)
LYMPHOCYTES NFR BLD: 11 % (ref 12–49)
MAGNESIUM SERPL-MCNC: 1.9 MG/DL (ref 1.6–2.4)
MCH RBC QN AUTO: 31.1 PG (ref 26–34)
MCHC RBC AUTO-ENTMCNC: 34.5 G/DL (ref 30–36.5)
MCV RBC AUTO: 90 FL (ref 80–99)
MONOCYTES # BLD: 1.5 K/UL (ref 0–1)
MONOCYTES NFR BLD: 13 % (ref 5–13)
NEUTS SEG # BLD: 8.6 K/UL (ref 1.8–8)
NEUTS SEG NFR BLD: 73 % (ref 32–75)
NRBC # BLD: 0 K/UL (ref 0–0.01)
NRBC BLD-RTO: 0 PER 100 WBC
PLATELET # BLD AUTO: 369 K/UL (ref 150–400)
PMV BLD AUTO: 10.3 FL (ref 8.9–12.9)
POTASSIUM SERPL-SCNC: 3.4 MMOL/L (ref 3.5–5.1)
PROT SERPL-MCNC: 5.9 G/DL (ref 6.4–8.2)
RBC # BLD AUTO: 3.09 M/UL (ref 3.8–5.2)
SERVICE CMNT-IMP: ABNORMAL
SERVICE CMNT-IMP: NORMAL
SODIUM SERPL-SCNC: 135 MMOL/L (ref 136–145)
WBC # BLD AUTO: 11.8 K/UL (ref 3.6–11)

## 2023-04-15 PROCEDURE — 74011000250 HC RX REV CODE- 250: Performed by: EMERGENCY MEDICINE

## 2023-04-15 PROCEDURE — 74011250637 HC RX REV CODE- 250/637: Performed by: HOSPITALIST

## 2023-04-15 PROCEDURE — 80053 COMPREHEN METABOLIC PANEL: CPT

## 2023-04-15 PROCEDURE — 74011250636 HC RX REV CODE- 250/636: Performed by: NURSE PRACTITIONER

## 2023-04-15 PROCEDURE — 74011250636 HC RX REV CODE- 250/636: Performed by: INTERNAL MEDICINE

## 2023-04-15 PROCEDURE — 83735 ASSAY OF MAGNESIUM: CPT

## 2023-04-15 PROCEDURE — 74011000250 HC RX REV CODE- 250: Performed by: INTERNAL MEDICINE

## 2023-04-15 PROCEDURE — 85025 COMPLETE CBC W/AUTO DIFF WBC: CPT

## 2023-04-15 PROCEDURE — 65270000046 HC RM TELEMETRY

## 2023-04-15 PROCEDURE — 36415 COLL VENOUS BLD VENIPUNCTURE: CPT

## 2023-04-15 PROCEDURE — 74011250637 HC RX REV CODE- 250/637: Performed by: INTERNAL MEDICINE

## 2023-04-15 RX ORDER — HYDRALAZINE HYDROCHLORIDE 20 MG/ML
10 INJECTION INTRAMUSCULAR; INTRAVENOUS
Status: DISCONTINUED | OUTPATIENT
Start: 2023-04-15 | End: 2023-04-18 | Stop reason: HOSPADM

## 2023-04-15 RX ORDER — POTASSIUM CHLORIDE 750 MG/1
20 TABLET, FILM COATED, EXTENDED RELEASE ORAL
Status: COMPLETED | OUTPATIENT
Start: 2023-04-15 | End: 2023-04-15

## 2023-04-15 RX ORDER — AMLODIPINE BESYLATE 5 MG/1
5 TABLET ORAL DAILY
Status: DISCONTINUED | OUTPATIENT
Start: 2023-04-15 | End: 2023-04-18 | Stop reason: HOSPADM

## 2023-04-15 RX ADMIN — SODIUM CHLORIDE, PRESERVATIVE FREE 10 ML: 5 INJECTION INTRAVENOUS at 05:59

## 2023-04-15 RX ADMIN — ACETAMINOPHEN 650 MG: 325 TABLET ORAL at 19:54

## 2023-04-15 RX ADMIN — LISINOPRIL: 20 TABLET ORAL at 08:51

## 2023-04-15 RX ADMIN — LOPERAMIDE HYDROCHLORIDE 2 MG: 2 CAPSULE ORAL at 09:04

## 2023-04-15 RX ADMIN — AMLODIPINE BESYLATE 5 MG: 5 TABLET ORAL at 09:42

## 2023-04-15 RX ADMIN — SODIUM CHLORIDE, PRESERVATIVE FREE 10 ML: 5 INJECTION INTRAVENOUS at 17:14

## 2023-04-15 RX ADMIN — HYDRALAZINE HYDROCHLORIDE 10 MG: 20 INJECTION INTRAMUSCULAR; INTRAVENOUS at 20:45

## 2023-04-15 RX ADMIN — IMATINIB MESYLATE 200 MG: 100 TABLET, FILM COATED ORAL at 09:05

## 2023-04-15 RX ADMIN — SODIUM CHLORIDE, PRESERVATIVE FREE 10 ML: 5 INJECTION INTRAVENOUS at 19:56

## 2023-04-15 RX ADMIN — ENOXAPARIN SODIUM 40 MG: 100 INJECTION SUBCUTANEOUS at 08:50

## 2023-04-15 RX ADMIN — HYDRALAZINE HYDROCHLORIDE 10 MG: 10 TABLET, FILM COATED ORAL at 00:06

## 2023-04-15 RX ADMIN — POTASSIUM CHLORIDE 20 MEQ: 750 TABLET, FILM COATED, EXTENDED RELEASE ORAL at 09:42

## 2023-04-15 RX ADMIN — SODIUM CHLORIDE 2 G: 9 INJECTION INTRAMUSCULAR; INTRAVENOUS; SUBCUTANEOUS at 12:36

## 2023-04-15 RX ADMIN — ACETAMINOPHEN 650 MG: 325 TABLET ORAL at 00:06

## 2023-04-15 RX ADMIN — HYDRALAZINE HYDROCHLORIDE 10 MG: 10 TABLET, FILM COATED ORAL at 11:19

## 2023-04-15 RX ADMIN — SODIUM CHLORIDE, PRESERVATIVE FREE 10 ML: 5 INJECTION INTRAVENOUS at 17:13

## 2023-04-16 LAB
ALBUMIN SERPL-MCNC: 2.5 G/DL (ref 3.5–5)
ALBUMIN/GLOB SERPL: 0.7 (ref 1.1–2.2)
ALP SERPL-CCNC: 83 U/L (ref 45–117)
ALT SERPL-CCNC: 47 U/L (ref 12–78)
ANION GAP SERPL CALC-SCNC: 6 MMOL/L (ref 5–15)
AST SERPL-CCNC: 30 U/L (ref 15–37)
BASOPHILS # BLD: 0 K/UL (ref 0–0.1)
BASOPHILS NFR BLD: 0 % (ref 0–1)
BILIRUB SERPL-MCNC: 0.2 MG/DL (ref 0.2–1)
BUN SERPL-MCNC: 17 MG/DL (ref 6–20)
BUN/CREAT SERPL: 22 (ref 12–20)
CALCIUM SERPL-MCNC: 8.4 MG/DL (ref 8.5–10.1)
CHLORIDE SERPL-SCNC: 109 MMOL/L (ref 97–108)
CO2 SERPL-SCNC: 27 MMOL/L (ref 21–32)
CREAT SERPL-MCNC: 0.77 MG/DL (ref 0.55–1.02)
DIFFERENTIAL METHOD BLD: ABNORMAL
EOSINOPHIL # BLD: 0.1 K/UL (ref 0–0.4)
EOSINOPHIL NFR BLD: 1 % (ref 0–7)
ERYTHROCYTE [DISTWIDTH] IN BLOOD BY AUTOMATED COUNT: 14.8 % (ref 11.5–14.5)
GLOBULIN SER CALC-MCNC: 3.6 G/DL (ref 2–4)
GLUCOSE SERPL-MCNC: 145 MG/DL (ref 65–100)
HCT VFR BLD AUTO: 29.2 % (ref 35–47)
HGB BLD-MCNC: 10.1 G/DL (ref 11.5–16)
IMM GRANULOCYTES # BLD AUTO: 0.2 K/UL (ref 0–0.04)
IMM GRANULOCYTES NFR BLD AUTO: 2 % (ref 0–0.5)
LYMPHOCYTES # BLD: 1.3 K/UL (ref 0.8–3.5)
LYMPHOCYTES NFR BLD: 13 % (ref 12–49)
MAGNESIUM SERPL-MCNC: 1.8 MG/DL (ref 1.6–2.4)
MCH RBC QN AUTO: 31.8 PG (ref 26–34)
MCHC RBC AUTO-ENTMCNC: 34.6 G/DL (ref 30–36.5)
MCV RBC AUTO: 91.8 FL (ref 80–99)
MONOCYTES # BLD: 1.2 K/UL (ref 0–1)
MONOCYTES NFR BLD: 12 % (ref 5–13)
NEUTS SEG # BLD: 7 K/UL (ref 1.8–8)
NEUTS SEG NFR BLD: 72 % (ref 32–75)
NRBC # BLD: 0 K/UL (ref 0–0.01)
NRBC BLD-RTO: 0 PER 100 WBC
PLATELET # BLD AUTO: 430 K/UL (ref 150–400)
PMV BLD AUTO: 10 FL (ref 8.9–12.9)
POTASSIUM SERPL-SCNC: 3.5 MMOL/L (ref 3.5–5.1)
PROT SERPL-MCNC: 6.1 G/DL (ref 6.4–8.2)
RBC # BLD AUTO: 3.18 M/UL (ref 3.8–5.2)
SODIUM SERPL-SCNC: 142 MMOL/L (ref 136–145)
WBC # BLD AUTO: 9.8 K/UL (ref 3.6–11)

## 2023-04-16 PROCEDURE — 74011250636 HC RX REV CODE- 250/636: Performed by: INTERNAL MEDICINE

## 2023-04-16 PROCEDURE — 74011250637 HC RX REV CODE- 250/637: Performed by: HOSPITALIST

## 2023-04-16 PROCEDURE — 74011000250 HC RX REV CODE- 250: Performed by: INTERNAL MEDICINE

## 2023-04-16 PROCEDURE — 97161 PT EVAL LOW COMPLEX 20 MIN: CPT

## 2023-04-16 PROCEDURE — 36415 COLL VENOUS BLD VENIPUNCTURE: CPT

## 2023-04-16 PROCEDURE — 97116 GAIT TRAINING THERAPY: CPT

## 2023-04-16 PROCEDURE — 74011250637 HC RX REV CODE- 250/637: Performed by: INTERNAL MEDICINE

## 2023-04-16 PROCEDURE — 65270000046 HC RM TELEMETRY

## 2023-04-16 PROCEDURE — 80053 COMPREHEN METABOLIC PANEL: CPT

## 2023-04-16 PROCEDURE — 87040 BLOOD CULTURE FOR BACTERIA: CPT

## 2023-04-16 PROCEDURE — 74011250636 HC RX REV CODE- 250/636: Performed by: NURSE PRACTITIONER

## 2023-04-16 PROCEDURE — 74011000250 HC RX REV CODE- 250: Performed by: EMERGENCY MEDICINE

## 2023-04-16 PROCEDURE — 83735 ASSAY OF MAGNESIUM: CPT

## 2023-04-16 PROCEDURE — 85025 COMPLETE CBC W/AUTO DIFF WBC: CPT

## 2023-04-16 RX ADMIN — TRAMADOL HYDROCHLORIDE 50 MG: 50 TABLET ORAL at 12:11

## 2023-04-16 RX ADMIN — ENOXAPARIN SODIUM 40 MG: 100 INJECTION SUBCUTANEOUS at 08:19

## 2023-04-16 RX ADMIN — SODIUM CHLORIDE, PRESERVATIVE FREE 10 ML: 5 INJECTION INTRAVENOUS at 04:55

## 2023-04-16 RX ADMIN — SODIUM CHLORIDE, PRESERVATIVE FREE 10 ML: 5 INJECTION INTRAVENOUS at 12:08

## 2023-04-16 RX ADMIN — AMLODIPINE BESYLATE 5 MG: 5 TABLET ORAL at 08:17

## 2023-04-16 RX ADMIN — SODIUM CHLORIDE, PRESERVATIVE FREE 10 ML: 5 INJECTION INTRAVENOUS at 22:13

## 2023-04-16 RX ADMIN — LISINOPRIL: 20 TABLET ORAL at 08:17

## 2023-04-16 RX ADMIN — LISINOPRIL: 20 TABLET ORAL at 19:02

## 2023-04-16 RX ADMIN — SODIUM CHLORIDE, PRESERVATIVE FREE 10 ML: 5 INJECTION INTRAVENOUS at 04:53

## 2023-04-16 RX ADMIN — SODIUM CHLORIDE 2 G: 9 INJECTION INTRAMUSCULAR; INTRAVENOUS; SUBCUTANEOUS at 12:07

## 2023-04-16 RX ADMIN — HYDRALAZINE HYDROCHLORIDE 10 MG: 20 INJECTION INTRAMUSCULAR; INTRAVENOUS at 04:15

## 2023-04-16 RX ADMIN — IMATINIB MESYLATE 200 MG: 100 TABLET, FILM COATED ORAL at 08:25

## 2023-04-16 RX ADMIN — TRAMADOL HYDROCHLORIDE 50 MG: 50 TABLET ORAL at 22:13

## 2023-04-16 RX ADMIN — LOPERAMIDE HYDROCHLORIDE 2 MG: 2 CAPSULE ORAL at 08:25

## 2023-04-16 RX ADMIN — SODIUM CHLORIDE, PRESERVATIVE FREE 10 ML: 5 INJECTION INTRAVENOUS at 12:07

## 2023-04-17 LAB
ANION GAP SERPL CALC-SCNC: 4 MMOL/L (ref 5–15)
BUN SERPL-MCNC: 15 MG/DL (ref 6–20)
BUN/CREAT SERPL: 21 (ref 12–20)
CALCIUM SERPL-MCNC: 8.4 MG/DL (ref 8.5–10.1)
CHLORIDE SERPL-SCNC: 107 MMOL/L (ref 97–108)
CO2 SERPL-SCNC: 27 MMOL/L (ref 21–32)
CREAT SERPL-MCNC: 0.72 MG/DL (ref 0.55–1.02)
ERYTHROCYTE [DISTWIDTH] IN BLOOD BY AUTOMATED COUNT: 14.8 % (ref 11.5–14.5)
GLUCOSE SERPL-MCNC: 115 MG/DL (ref 65–100)
HCT VFR BLD AUTO: 29.5 % (ref 35–47)
HGB BLD-MCNC: 10 G/DL (ref 11.5–16)
MAGNESIUM SERPL-MCNC: 1.7 MG/DL (ref 1.6–2.4)
MCH RBC QN AUTO: 30.9 PG (ref 26–34)
MCHC RBC AUTO-ENTMCNC: 33.9 G/DL (ref 30–36.5)
MCV RBC AUTO: 91 FL (ref 80–99)
NRBC # BLD: 0 K/UL (ref 0–0.01)
NRBC BLD-RTO: 0 PER 100 WBC
PLATELET # BLD AUTO: 450 K/UL (ref 150–400)
PMV BLD AUTO: 10 FL (ref 8.9–12.9)
POTASSIUM SERPL-SCNC: 3.4 MMOL/L (ref 3.5–5.1)
RBC # BLD AUTO: 3.24 M/UL (ref 3.8–5.2)
SODIUM SERPL-SCNC: 138 MMOL/L (ref 136–145)
WBC # BLD AUTO: 11.2 K/UL (ref 3.6–11)

## 2023-04-17 PROCEDURE — 83735 ASSAY OF MAGNESIUM: CPT

## 2023-04-17 PROCEDURE — 74011250637 HC RX REV CODE- 250/637: Performed by: INTERNAL MEDICINE

## 2023-04-17 PROCEDURE — 74011250636 HC RX REV CODE- 250/636: Performed by: INTERNAL MEDICINE

## 2023-04-17 PROCEDURE — 65270000029 HC RM PRIVATE

## 2023-04-17 PROCEDURE — 74011000250 HC RX REV CODE- 250: Performed by: INTERNAL MEDICINE

## 2023-04-17 PROCEDURE — 74011250637 HC RX REV CODE- 250/637: Performed by: HOSPITALIST

## 2023-04-17 PROCEDURE — 36415 COLL VENOUS BLD VENIPUNCTURE: CPT

## 2023-04-17 PROCEDURE — 74011000250 HC RX REV CODE- 250: Performed by: EMERGENCY MEDICINE

## 2023-04-17 PROCEDURE — 80048 BASIC METABOLIC PNL TOTAL CA: CPT

## 2023-04-17 PROCEDURE — 85027 COMPLETE CBC AUTOMATED: CPT

## 2023-04-17 RX ADMIN — AMLODIPINE BESYLATE 5 MG: 5 TABLET ORAL at 09:14

## 2023-04-17 RX ADMIN — LISINOPRIL: 20 TABLET ORAL at 17:28

## 2023-04-17 RX ADMIN — IMATINIB MESYLATE 200 MG: 100 TABLET, FILM COATED ORAL at 09:23

## 2023-04-17 RX ADMIN — SODIUM CHLORIDE, PRESERVATIVE FREE 10 ML: 5 INJECTION INTRAVENOUS at 05:53

## 2023-04-17 RX ADMIN — SODIUM CHLORIDE, PRESERVATIVE FREE 10 ML: 5 INJECTION INTRAVENOUS at 13:15

## 2023-04-17 RX ADMIN — SODIUM CHLORIDE, PRESERVATIVE FREE 10 ML: 5 INJECTION INTRAVENOUS at 13:14

## 2023-04-17 RX ADMIN — SODIUM CHLORIDE, PRESERVATIVE FREE 10 ML: 5 INJECTION INTRAVENOUS at 05:54

## 2023-04-17 RX ADMIN — SODIUM CHLORIDE 2 G: 9 INJECTION INTRAMUSCULAR; INTRAVENOUS; SUBCUTANEOUS at 13:14

## 2023-04-17 RX ADMIN — TRAMADOL HYDROCHLORIDE 50 MG: 50 TABLET ORAL at 13:22

## 2023-04-17 RX ADMIN — TRAMADOL HYDROCHLORIDE 50 MG: 50 TABLET ORAL at 19:46

## 2023-04-17 RX ADMIN — LISINOPRIL: 20 TABLET ORAL at 09:14

## 2023-04-17 RX ADMIN — ENOXAPARIN SODIUM 40 MG: 100 INJECTION SUBCUTANEOUS at 09:14

## 2023-04-17 RX ADMIN — SODIUM CHLORIDE, PRESERVATIVE FREE 20 ML: 5 INJECTION INTRAVENOUS at 23:36

## 2023-04-17 RX ADMIN — LOPERAMIDE HYDROCHLORIDE 2 MG: 2 CAPSULE ORAL at 09:26

## 2023-04-17 NOTE — PROGRESS NOTES
TRANSFER - OUT REPORT:    Verbal report given to Britton Herring (name) on Bipin Ren (patient name)  being transferred to [address] [place] [address] (unit) for routine progression of care   Report consisted of patients Situation, Background, Assessment and   Recommendations(SBAR).     JULIANA Goff

## 2023-04-17 NOTE — PROGRESS NOTES
Spiritual Care Assessment/Progress Note  [address]      NAME: Rosario Patel      MRN: [id number]  AGE: [de-identified] y.o. SEX: female  Catholic Affiliation: Orthodox   Language: English     4/17/2023     Total Time (in minutes): 15     Spiritual Assessment begun in [facility] 3 PRO CARE TELE 2 through conversation with:         []Patient        [] Family    [] Friend(s)        Reason for Consult: Palliative Care, Initial/Spiritual Assessment     Spiritual beliefs: (Please include comment if needed)     [] Identifies with a keon tradition:         [] Supported by a keon community:            [] Claims no spiritual orientation:           [] Seeking spiritual identity:                [] Adheres to an individual form of spirituality:           [x] Not able to assess:                           Identified resources for coping:      [] Prayer                               [] Music                  [] Guided Imagery     [] Family/friends                 [] Pet visits     [] Devotional reading                         [x] Unknown     [] Other:                                               Interventions offered during this visit: (See comments for more details)    Patient Interventions: Initial visit           Plan of Care:     [] Support spiritual and/or cultural needs    [] Support AMD and/or advance care planning process      [] Support grieving process   [] Coordinate Rites and/or Rituals    [] Coordination with community clergy   [] No spiritual needs identified at this time   [] Detailed Plan of Care below (See Comments)  [] Make referral to Music Therapy  [] Make referral to Pet Therapy     [] Make referral to Addiction services  [] Make referral to [facility]  [] Make referral to Spiritual Care Partner  [] No future visits requested        [x] Contact Spiritual Care for further referrals     Attempted visit for palliative initial spiritual assessment. Patient's chart was consulted.  Unable to visit patient at this time. Patient was sleeping and did not awake. No family present.    Chaplain Crow MDiv, MS, [facility]

## 2023-04-17 NOTE — PROGRESS NOTES
Problem: Falls - Risk of  Goal: *Absence of Falls  Description: Document Miah Henry Fall Risk and appropriate interventions in the flowsheet.   Outcome: Progressing Towards Goal  Note: Fall Risk Interventions:        Problem: Patient Education: Go to Patient Education Activity  Goal: Patient/Family Education  Outcome: Progressing Towards Goal

## 2023-04-17 NOTE — PROGRESS NOTES
TRANSFER - OUT REPORT:  0889  Verbal report given to Dolly Yanez RN (name) on Harry Vail  being transferred to 5 th floor room 528(unit) for routine progression of care       Report consisted of patients Situation, Background, Assessment and   Recommendations(SBAR). Information from the following report(s) SBAR, Kardex, and MAR was reviewed with the receiving nurse. Lines:   Peripheral IV 04/13/23 Right Antecubital (Active)   Site Assessment Clean, dry, & intact 04/17/23 0419   Phlebitis Assessment 0 04/17/23 0419   Infiltration Assessment 0 04/17/23 0419   Dressing Status Clean, dry, & intact 04/17/23 0419   Dressing Type Transparent 04/17/23 0419   Hub Color/Line Status Pink 04/17/23 0419   Action Taken Open ports on tubing capped 04/17/23 0419   Alcohol Cap Used Yes 04/17/23 0419        Opportunity for questions and clarification was provided.       Patient transported with:   Registered Nurse

## 2023-04-17 NOTE — PROGRESS NOTES
Problem: Falls - Risk of  Goal: *Absence of Falls  Description: Document Sanjana Kenisha Fall Risk and appropriate interventions in the flowsheet.   Outcome: Progressing Towards Goal  Note: Fall Risk Interventions:                                Problem: Urinary Tract Infection - Adult  Goal: *Absence of infection signs and symptoms  Outcome: Progressing Towards Goal     Problem: Patient Education: Go to Patient Education Activity  Goal: Patient/Family Education  Outcome: Progressing Towards Goal

## 2023-04-18 VITALS
BODY MASS INDEX: 26.17 KG/M2 | WEIGHT: 142.2 LBS | TEMPERATURE: 98.1 F | HEIGHT: 62 IN | RESPIRATION RATE: 17 BRPM | DIASTOLIC BLOOD PRESSURE: 65 MMHG | SYSTOLIC BLOOD PRESSURE: 116 MMHG | OXYGEN SATURATION: 96 % | HEART RATE: 85 BPM

## 2023-04-18 LAB
ANION GAP SERPL CALC-SCNC: 5 MMOL/L (ref 5–15)
BASOPHILS # BLD: 0.1 K/UL (ref 0–0.1)
BASOPHILS NFR BLD: 1 % (ref 0–1)
BUN SERPL-MCNC: 14 MG/DL (ref 6–20)
BUN/CREAT SERPL: 19 (ref 12–20)
CALCIUM SERPL-MCNC: 8.5 MG/DL (ref 8.5–10.1)
CHLORIDE SERPL-SCNC: 105 MMOL/L (ref 97–108)
CO2 SERPL-SCNC: 28 MMOL/L (ref 21–32)
CREAT SERPL-MCNC: 0.75 MG/DL (ref 0.55–1.02)
DIFFERENTIAL METHOD BLD: ABNORMAL
EOSINOPHIL # BLD: 0.1 K/UL (ref 0–0.4)
EOSINOPHIL NFR BLD: 1 % (ref 0–7)
ERYTHROCYTE [DISTWIDTH] IN BLOOD BY AUTOMATED COUNT: 15.1 % (ref 11.5–14.5)
GLUCOSE SERPL-MCNC: 110 MG/DL (ref 65–100)
HCT VFR BLD AUTO: 29.7 % (ref 35–47)
HGB BLD-MCNC: 9.9 G/DL (ref 11.5–16)
IMM GRANULOCYTES # BLD AUTO: 0.2 K/UL (ref 0–0.04)
IMM GRANULOCYTES NFR BLD AUTO: 2 % (ref 0–0.5)
LYMPHOCYTES # BLD: 1.6 K/UL (ref 0.8–3.5)
LYMPHOCYTES NFR BLD: 15 % (ref 12–49)
MAGNESIUM SERPL-MCNC: 1.7 MG/DL (ref 1.6–2.4)
MCH RBC QN AUTO: 30.9 PG (ref 26–34)
MCHC RBC AUTO-ENTMCNC: 33.3 G/DL (ref 30–36.5)
MCV RBC AUTO: 92.8 FL (ref 80–99)
MONOCYTES # BLD: 1.4 K/UL (ref 0–1)
MONOCYTES NFR BLD: 13 % (ref 5–13)
NEUTS SEG # BLD: 7.1 K/UL (ref 1.8–8)
NEUTS SEG NFR BLD: 68 % (ref 32–75)
NRBC # BLD: 0 K/UL (ref 0–0.01)
NRBC BLD-RTO: 0 PER 100 WBC
PLATELET # BLD AUTO: 447 K/UL (ref 150–400)
PMV BLD AUTO: 9.9 FL (ref 8.9–12.9)
POTASSIUM SERPL-SCNC: 3.3 MMOL/L (ref 3.5–5.1)
RBC # BLD AUTO: 3.2 M/UL (ref 3.8–5.2)
SODIUM SERPL-SCNC: 138 MMOL/L (ref 136–145)
WBC # BLD AUTO: 10.5 K/UL (ref 3.6–11)

## 2023-04-18 PROCEDURE — 80048 BASIC METABOLIC PNL TOTAL CA: CPT

## 2023-04-18 PROCEDURE — 74011000250 HC RX REV CODE- 250: Performed by: EMERGENCY MEDICINE

## 2023-04-18 PROCEDURE — 74011250636 HC RX REV CODE- 250/636: Performed by: INTERNAL MEDICINE

## 2023-04-18 PROCEDURE — 36415 COLL VENOUS BLD VENIPUNCTURE: CPT

## 2023-04-18 PROCEDURE — 85025 COMPLETE CBC W/AUTO DIFF WBC: CPT

## 2023-04-18 PROCEDURE — 74011250637 HC RX REV CODE- 250/637: Performed by: HOSPITALIST

## 2023-04-18 PROCEDURE — 74011250637 HC RX REV CODE- 250/637: Performed by: INTERNAL MEDICINE

## 2023-04-18 PROCEDURE — 83735 ASSAY OF MAGNESIUM: CPT

## 2023-04-18 PROCEDURE — 74011250637 HC RX REV CODE- 250/637: Performed by: NURSE PRACTITIONER

## 2023-04-18 PROCEDURE — 74011000250 HC RX REV CODE- 250: Performed by: INTERNAL MEDICINE

## 2023-04-18 RX ORDER — POTASSIUM CHLORIDE 750 MG/1
40 TABLET, FILM COATED, EXTENDED RELEASE ORAL ONCE
Status: COMPLETED | OUTPATIENT
Start: 2023-04-18 | End: 2023-04-18

## 2023-04-18 RX ORDER — AMLODIPINE BESYLATE 5 MG/1
5 TABLET ORAL DAILY
Qty: 30 TABLET | Refills: 0 | Status: SHIPPED | OUTPATIENT
Start: 2023-04-19

## 2023-04-18 RX ORDER — CIPROFLOXACIN 500 MG/1
500 TABLET ORAL 2 TIMES DAILY
Qty: 10 TABLET | Refills: 0 | Status: SHIPPED | OUTPATIENT
Start: 2023-04-18

## 2023-04-18 RX ADMIN — POTASSIUM CHLORIDE 40 MEQ: 750 TABLET, FILM COATED, EXTENDED RELEASE ORAL at 05:28

## 2023-04-18 RX ADMIN — LOPERAMIDE HYDROCHLORIDE 2 MG: 2 CAPSULE ORAL at 08:23

## 2023-04-18 RX ADMIN — TRAMADOL HYDROCHLORIDE 50 MG: 50 TABLET ORAL at 08:10

## 2023-04-18 RX ADMIN — SODIUM CHLORIDE 2 G: 9 INJECTION INTRAMUSCULAR; INTRAVENOUS; SUBCUTANEOUS at 12:01

## 2023-04-18 RX ADMIN — SODIUM CHLORIDE, PRESERVATIVE FREE 10 ML: 5 INJECTION INTRAVENOUS at 05:30

## 2023-04-18 RX ADMIN — IMATINIB MESYLATE 200 MG: 100 TABLET, FILM COATED ORAL at 08:13

## 2023-04-18 RX ADMIN — TRAMADOL HYDROCHLORIDE 50 MG: 50 TABLET ORAL at 13:21

## 2023-04-18 RX ADMIN — SODIUM CHLORIDE, PRESERVATIVE FREE 10 ML: 5 INJECTION INTRAVENOUS at 05:35

## 2023-04-18 RX ADMIN — SODIUM CHLORIDE, PRESERVATIVE FREE 10 ML: 5 INJECTION INTRAVENOUS at 05:29

## 2023-04-18 RX ADMIN — LISINOPRIL: 20 TABLET ORAL at 08:10

## 2023-04-18 RX ADMIN — AMLODIPINE BESYLATE 5 MG: 5 TABLET ORAL at 08:10

## 2023-04-18 RX ADMIN — ENOXAPARIN SODIUM 40 MG: 100 INJECTION SUBCUTANEOUS at 08:10

## 2023-04-18 NOTE — PROGRESS NOTES
Problem: Falls - Risk of  Goal: *Absence of Falls  Description: Document Danell Holter Fall Risk and appropriate interventions in the flowsheet.   Outcome: Progressing Towards Goal  Note: Fall Risk Interventions:     Problem: Patient Education: Go to Patient Education Activity  Goal: Patient/Family Education  Outcome: Progressing Towards Goal     Problem: Urinary Tract Infection - Adult  Goal: *Absence of infection signs and symptoms  Outcome: Progressing Towards Goal     Problem: Patient Education: Go to Patient Education Activity  Goal: Patient/Family Education  Outcome: Progressing Towards Goal

## 2023-04-18 NOTE — PROGRESS NOTES
Problem: Falls - Risk of  Goal: *Absence of Falls  Description: Document Sweta Reid Fall Risk and appropriate interventions in the flowsheet.   4/18/2023 1313 by Chava Fischer RN  Outcome: Resolved/Met  Note: Fall Risk Interventions:                             4/18/2023 1259 by Chava Fischer RN  Outcome: Progressing Towards Goal  Note: Fall Risk Interventions:                                Problem: Patient Education: Go to Patient Education Activity  Goal: Patient/Family Education  4/18/2023 1313 by Chava Fischer RN  Outcome: Resolved/Met  4/18/2023 1259 by Chava Fischer RN  Outcome: Progressing Towards Goal     Problem: Urinary Tract Infection - Adult  Goal: *Absence of infection signs and symptoms  4/18/2023 1313 by Chava Fischer RN  Outcome: Resolved/Met  4/18/2023 1259 by Chava Fischer RN  Outcome: Progressing Towards Goal     Problem: Patient Education: Go to Patient Education Activity  Goal: Patient/Family Education  4/18/2023 1313 by Chava Fischer RN  Outcome: Resolved/Met  4/18/2023 1259 by Chava Fischer RN  Outcome: Progressing Towards Goal

## 2023-04-18 NOTE — DISCHARGE SUMMARY
Hospitalist Discharge Summary     Patient ID:  Nayan Valdes  [id number]  87 y.o.  1940    Admit date: 4/13/2023    Discharge date and time: 4/18/2023    Admission Diagnoses: Pyelonephritis [N12]  Bacteremia [R78.81]      Discharge Diagnoses: Active Problems:    Bacteremia (4/13/2023)      Pyelonephritis (4/13/2023)           E. coli bacteremia  UTI  Left-sided pyelonephritis  CML  Hypertension  Hyperlipidemia          Hospital Course:     Nayan Valdes is a [de-identified] y.o.  female with history of CML, hypertension, and high cholesterol and recent urinary tract infection presenting with E. coli bacteremia. Patient was admitted to the hospitalist service and started on empiric Rocephin. CT of the abdomen pelvis was suggestive of left-sided pyelonephritis. However urinalysis was negative. Patient is immunocompromised due to treatment for CML with Gleevec. Oncology was consulted and recommended proceeding with this medication despite the infection. Repeat blood cultures were collected and have no growth to date. Patient remained afebrile without leukocytosis or other signs of sepsis. She was transitioned to Cipro which will be continued for 10 days. Her blood pressure was above goal on Zestoretic. Norvasc was added. She was continued on statin for high cholesterol. No other changes were made to her home medications. She was discharged in stable and improved condition. PCP: Sabina Enriquez MD     Consults: None    Condition of patient at discharge: improved    Discharge Exam:     Visit Vitals  /65 (BP 1 Location: Left upper arm, BP Patient Position: Reclining)   Pulse 85   Temp 98.1 °F (36.7 °C)   Resp 17   Ht 5' 2\" (1.575 m)   Wt 64.5 kg (142 lb 3.2 oz)   SpO2 96%   BMI 26.01 kg/m²        General:   No acute distress, resting comfortably in bed. Heart:  RRR, No MRG  Lungs:  CTAB. Non-labored breathing. Abdomen:  Soft . Nondistended. NTTP. BS present. Extremities:  No edema.   Neuro: Alert and interactive. No focal deficits. Disposition: home    Current Discharge Medication List        START taking these medications    Details   amLODIPine (NORVASC) 5 mg tablet Take 1 Tablet by mouth daily. Qty: 30 Tablet, Refills: 0  Start date: 4/19/2023      ciprofloxacin HCl (Cipro) 500 mg tablet Take 1 Tablet by mouth two (2) times a day. Qty: 10 Tablet, Refills: 0  Start date: 4/18/2023           CONTINUE these medications which have NOT CHANGED    Details   lisinopril-hydroCHLOROthiazide (PRINZIDE, ZESTORETIC) 20-12.5 mg per tablet Take 1 Tablet by mouth daily. imatinib (GLEEVEC) 100 mg tablet Take 2 Tablets by mouth daily. Qty: 60 Tablet, Refills: 3    Comments: Patient's email: [email or web address]  Associated Diagnoses: CML (chronic myelocytic leukemia) (HCC)      traMADoL (ULTRAM) 50 mg tablet       calcium citrate-vitamin d3 (CITRACAL+D) 315 mg-5 mcg (200 unit) tab Take 1 Tab by mouth daily (with breakfast). cyanocobalamin 1,000 mcg tablet Take 5,000 mcg by mouth daily. cranberry fruit concentrate (AZO CRANBERRY PO) Take  by mouth. STOP taking these medications       cephALEXin (Keflex) 500 mg capsule Comments:   Reason for Stopping:         benzonatate (TESSALON) 100 mg capsule Comments:   Reason for Stopping:                I have reviewed discharge instructions with the patient. The patient verbalized understanding.      Approximate time spent in patient care on day of discharge: 30 mins    Signed:  Tomas Killian MD  4/18/2023  2:31 PM

## 2023-04-18 NOTE — PROGRESS NOTES
Rounded on Yarsanism patients and provided Anointing of the Sick at request of patient.     Panchito Carrera

## 2023-04-18 NOTE — PALLIATIVE CARE DISCHARGE
The Palliative Medicine team was consulted as part of your / your loved one's care in the hospital. Our team is a supportive service; we strive to relieve suffering and improve quality of life. You identified the following goal(s) as your main focus for healthcare: Patient/Health Care Proxy Stated Goals: Treat infection and return home    We reviewed advance care planning information, which includes the following:  Advance Care Planning 4/18/2023   Patient's Healthcare Decision Maker is: Named in scanned ACP document   Confirm Advance Directive Yes, on file   Patient Would Like to Complete Advance Directive Completed   Does the patient have other document types Do Not Resuscitate     We reviewed / discussed your code status as: DNR     Full Code means perform CPR in the event of cardiac arrest     [facility] means do NOT perform CPR in the event of cardiac arrest     Partial Code means you have specific preferences, please discuss with your health care team     No Order means this issue was not addressed / resolved during your stay    Because of the importance of this information, we are providing you with a printed copy to share with other healthcare providers after this hospitalization is complete. If any of the above information is incomplete or incorrect, please contact the Palliative Medicine team at [phone].

## 2023-04-18 NOTE — PROGRESS NOTES
4/18/2023   CARE MANAGEMENT NOTE:  Pt transferred from [facility] to the 5th floor. EMR reviewed and handoff received from previous  Yogesh Villeda). Pt was admitted with pyelo. Reportedly, pt resides with her  Sherlyn Lynn ([phone]) and 8 children and grandchildren. Dtr Gurmeet Meter ([phone]) is another family contact. RUR 13%    Transition Plan of Care:  Hem/onc , Palliative following for medical management  PT eval complete; pt ambulated 115 feet on 4/16 and no further rehab therapies indicated  Pt will return home with her family  Outpatient follow up  Spouse will transport pt home    CM will continue to follow pt until discharged.   Terese

## 2023-04-18 NOTE — PROGRESS NOTES
Problem: Falls - Risk of  Goal: *Absence of Falls  Description: Document Rishabh Rizvi Fall Risk and appropriate interventions in the flowsheet.   4/18/2023 0312 by Haider Bernard RN  Outcome: Progressing Towards Goal  Note: Fall Risk Interventions:                             4/18/2023 0107 by Haider Bernard RN  Outcome: Progressing Towards Goal  Note: Fall Risk Interventions:                                Problem: Patient Education: Go to Patient Education Activity  Goal: Patient/Family Education  4/18/2023 0312 by Haider Bernard RN  Outcome: Progressing Towards Goal  4/18/2023 0107 by Haider Bernard RN  Outcome: Progressing Towards Goal     Problem: Urinary Tract Infection - Adult  Goal: *Absence of infection signs and symptoms  4/18/2023 0312 by Haider Bernard RN  Outcome: Progressing Towards Goal  4/18/2023 0107 by Haider Bernard RN  Outcome: Progressing Towards Goal     Problem: Patient Education: Go to Patient Education Activity  Goal: Patient/Family Education  4/18/2023 0312 by Haider Bernard RN  Outcome: Progressing Towards Goal  4/18/2023 0107 by Haider Bernard RN  Outcome: Progressing Towards Goal

## 2023-04-22 DIAGNOSIS — C92.10 CML (CHRONIC MYELOCYTIC LEUKEMIA) (HCC): Primary | ICD-10-CM

## 2023-04-22 LAB
BACTERIA SPEC CULT: NORMAL
SERVICE CMNT-IMP: NORMAL

## 2023-04-23 DIAGNOSIS — C92.10 CML (CHRONIC MYELOCYTIC LEUKEMIA) (HCC): Primary | ICD-10-CM

## 2023-04-24 DIAGNOSIS — C92.10 CML (CHRONIC MYELOCYTIC LEUKEMIA) (HCC): Primary | ICD-10-CM

## 2023-05-03 DIAGNOSIS — C92.10 CML (CHRONIC MYELOCYTIC LEUKEMIA) (HCC): ICD-10-CM

## 2023-05-04 RX ORDER — IMATINIB MESYLATE 100 MG/1
200 TABLET, FILM COATED ORAL DAILY
Qty: 60 TABLET | Refills: 3 | Status: ACTIVE | OUTPATIENT
Start: 2023-05-04

## 2023-05-12 DIAGNOSIS — C92.10 CML (CHRONIC MYELOCYTIC LEUKEMIA) (HCC): ICD-10-CM

## 2023-05-19 RX ORDER — CIPROFLOXACIN 500 MG/1
500 TABLET, FILM COATED ORAL 2 TIMES DAILY
COMMUNITY
Start: 2023-04-18 | End: 2023-05-30 | Stop reason: ALTCHOICE

## 2023-05-19 RX ORDER — LANOLIN ALCOHOL/MO/W.PET/CERES
1 CREAM (GRAM) TOPICAL
COMMUNITY

## 2023-05-19 RX ORDER — AMLODIPINE BESYLATE 5 MG/1
5 TABLET ORAL DAILY
COMMUNITY
Start: 2023-04-19

## 2023-05-19 RX ORDER — IMATINIB MESYLATE 100 MG/1
200 TABLET, FILM COATED ORAL DAILY
COMMUNITY
Start: 2023-05-04

## 2023-05-19 RX ORDER — LISINOPRIL AND HYDROCHLOROTHIAZIDE 20; 12.5 MG/1; MG/1
1 TABLET ORAL DAILY
COMMUNITY

## 2023-05-19 RX ORDER — TRAMADOL HYDROCHLORIDE 50 MG/1
TABLET ORAL
COMMUNITY
Start: 2022-01-27

## 2023-05-30 ENCOUNTER — OFFICE VISIT (OUTPATIENT)
Age: 83
End: 2023-05-30
Payer: COMMERCIAL

## 2023-05-30 VITALS
DIASTOLIC BLOOD PRESSURE: 69 MMHG | RESPIRATION RATE: 17 BRPM | WEIGHT: 141 LBS | SYSTOLIC BLOOD PRESSURE: 147 MMHG | OXYGEN SATURATION: 98 % | TEMPERATURE: 96.8 F | BODY MASS INDEX: 26.62 KG/M2 | HEART RATE: 72 BPM | HEIGHT: 61 IN

## 2023-05-30 DIAGNOSIS — Z51.11 ENCOUNTER FOR ANTINEOPLASTIC CHEMOTHERAPY: ICD-10-CM

## 2023-05-30 DIAGNOSIS — I10 ESSENTIAL (PRIMARY) HYPERTENSION: ICD-10-CM

## 2023-05-30 DIAGNOSIS — R21 RASH: ICD-10-CM

## 2023-05-30 DIAGNOSIS — C92.10 CHRONIC MYELOID LEUKEMIA, BCR/ABL-POSITIVE, NOT HAVING ACHIEVED REMISSION (HCC): Primary | ICD-10-CM

## 2023-05-30 PROCEDURE — 3078F DIAST BP <80 MM HG: CPT | Performed by: INTERNAL MEDICINE

## 2023-05-30 PROCEDURE — 99215 OFFICE O/P EST HI 40 MIN: CPT | Performed by: INTERNAL MEDICINE

## 2023-05-30 PROCEDURE — 1123F ACP DISCUSS/DSCN MKR DOCD: CPT | Performed by: INTERNAL MEDICINE

## 2023-05-30 PROCEDURE — 3077F SYST BP >= 140 MM HG: CPT | Performed by: INTERNAL MEDICINE

## 2023-05-30 RX ORDER — ZINC CITRATE/PHYTASE 25MG-500MG
CAPSULE ORAL
COMMUNITY
Start: 2023-03-07

## 2023-05-30 ASSESSMENT — PATIENT HEALTH QUESTIONNAIRE - PHQ9
SUM OF ALL RESPONSES TO PHQ QUESTIONS 1-9: 0
SUM OF ALL RESPONSES TO PHQ9 QUESTIONS 1 & 2: 0
1. LITTLE INTEREST OR PLEASURE IN DOING THINGS: 0
SUM OF ALL RESPONSES TO PHQ QUESTIONS 1-9: 0
SUM OF ALL RESPONSES TO PHQ QUESTIONS 1-9: 0
2. FEELING DOWN, DEPRESSED OR HOPELESS: 0
SUM OF ALL RESPONSES TO PHQ QUESTIONS 1-9: 0

## 2023-05-30 NOTE — PROGRESS NOTES
Room 12     Identified pt with two pt identifiers(name and ). Reviewed record in preparation for visit and have obtained necessary documentation. All patient medications has been reviewed. Chief Complaint   Patient presents with    Follow-up     3 month follow up CML         Health Maintenance Due   Topic    DTaP/Tdap/Td vaccine (1 - Tdap)    Shingles vaccine (1 of 2)    DEXA (modify frequency per FRAX score)     Pneumococcal 65+ years Vaccine (2 - PPSV23 if available, else PCV20)    COVID-19 Vaccine (3 - Pfizer risk series)    Annual Wellness Visit (AWV)      Health Maintenance Review: Patient reminded of \"due or due soon\" health maintenance. I have asked the patient to contact his/her primary care provider (PCP) for follow-up on his/her health maintenance. Wt Readings from Last 3 Encounters:   23 147 lb 3.2 oz (66.8 kg)   22 146 lb 3.2 oz (66.3 kg)   22 145 lb 12.8 oz (66.1 kg)     Temp Readings from Last 3 Encounters:   No data found for Temp     BP Readings from Last 3 Encounters:   23 117/72   22 (!) 154/79   22 (!) 164/78     Pulse Readings from Last 3 Encounters:   23 57   22 69   22 73       1. \"Have you been to the ER, urgent care clinic since your last visit? Hospitalized since your last visit? \" Yes When: 23-23 Where: [facility] Reason for visit: Pyelonephritis and Bacteremia. 2. \"Have you seen or consulted any other health care providers outside of the [address] [place] [address] since your last visit? \" No     3. For patients aged 39-70: Has the patient had a colonoscopy / FIT/ Cologuard? NA - based on age      If the patient is female:    4. For patients aged 41-77: Has the patient had a mammogram within the past 2 years? NA - based on age or sex      11. For patients aged 21-65: Has the patient had a pap smear?  NA - based on age or sex        Patient is accompanied by  I have received verbal consent from Shirin Baxter to

## 2023-06-14 LAB
ALBUMIN SERPL-MCNC: 4.2 G/DL (ref 3.5–5)
ALBUMIN/GLOB SERPL: 1.4 (ref 1.1–2.2)
ALP SERPL-CCNC: 71 U/L (ref 45–117)
ALT SERPL-CCNC: 38 U/L (ref 12–78)
ANION GAP SERPL CALC-SCNC: 5 MMOL/L (ref 5–15)
AST SERPL-CCNC: 25 U/L (ref 15–37)
BASOPHILS # BLD: 0.1 K/UL (ref 0–0.1)
BASOPHILS NFR BLD: 1 % (ref 0–1)
BILIRUB SERPL-MCNC: 0.3 MG/DL (ref 0.2–1)
BUN SERPL-MCNC: 16 MG/DL (ref 6–20)
BUN/CREAT SERPL: 15 (ref 12–20)
CALCIUM SERPL-MCNC: 9.3 MG/DL (ref 8.5–10.1)
CHLORIDE SERPL-SCNC: 106 MMOL/L (ref 97–108)
CO2 SERPL-SCNC: 28 MMOL/L (ref 21–32)
CREAT SERPL-MCNC: 1.05 MG/DL (ref 0.55–1.02)
DIFFERENTIAL METHOD BLD: ABNORMAL
EOSINOPHIL # BLD: 0.1 K/UL (ref 0–0.4)
EOSINOPHIL NFR BLD: 2 % (ref 0–7)
ERYTHROCYTE [DISTWIDTH] IN BLOOD BY AUTOMATED COUNT: 14.9 % (ref 11.5–14.5)
GLOBULIN SER CALC-MCNC: 3.1 G/DL (ref 2–4)
GLUCOSE SERPL-MCNC: 114 MG/DL (ref 65–100)
HCT VFR BLD AUTO: 31.2 % (ref 35–47)
HGB BLD-MCNC: 10 G/DL (ref 11.5–16)
IMM GRANULOCYTES # BLD AUTO: 0 K/UL (ref 0–0.04)
IMM GRANULOCYTES NFR BLD AUTO: 0 % (ref 0–0.5)
INTERPRETATION, OPI7M: POSITIVE
LAB DIRECTOR NAME PROVIDER: NORMAL
LABORATORY COMMENT REPORT: NORMAL
LYMPHOCYTES # BLD: 1.9 K/UL (ref 0.8–3.5)
LYMPHOCYTES NFR BLD: 26 % (ref 12–49)
MCH RBC QN AUTO: 30.8 PG (ref 26–34)
MCHC RBC AUTO-ENTMCNC: 32.1 G/DL (ref 30–36.5)
MCV RBC AUTO: 96 FL (ref 80–99)
MONOCYTES # BLD: 1 K/UL (ref 0–1)
MONOCYTES NFR BLD: 14 % (ref 5–13)
NEUTS SEG # BLD: 4.2 K/UL (ref 1.8–8)
NEUTS SEG NFR BLD: 57 % (ref 32–75)
NRBC # BLD: 0 K/UL (ref 0–0.01)
NRBC BLD-RTO: 0 PER 100 WBC
PLATELET # BLD AUTO: 278 K/UL (ref 150–400)
PMV BLD AUTO: 11.2 FL (ref 8.9–12.9)
POTASSIUM SERPL-SCNC: 3.9 MMOL/L (ref 3.5–5.1)
PROT SERPL-MCNC: 7.3 G/DL (ref 6.4–8.2)
RBC # BLD AUTO: 3.25 M/UL (ref 3.8–5.2)
SODIUM SERPL-SCNC: 139 MMOL/L (ref 136–145)
T(ABL1,BCR)B2A2/CONTROL BLD/T: NORMAL
T(ABL1,BCR)B2A2/CONTROL BLD/T: NORMAL %
T(ABL1,BCR)B3A2/CONTROL BLD/T: 0.18 %
T(ABL1,BCR)E1A2/CONTROL BLD/T: NORMAL %
WBC # BLD AUTO: 7.4 K/UL (ref 3.6–11)

## 2023-07-31 DIAGNOSIS — C92.10 CHRONIC MYELOID LEUKEMIA, BCR/ABL-POSITIVE, NOT HAVING ACHIEVED REMISSION (HCC): ICD-10-CM

## 2023-07-31 LAB — LDH SERPL L TO P-CCNC: 254 U/L (ref 81–246)

## 2023-08-01 LAB
ALBUMIN SERPL-MCNC: 4 G/DL (ref 3.5–5)
ALBUMIN/GLOB SERPL: 1.5 (ref 1.1–2.2)
ALP SERPL-CCNC: 65 U/L (ref 45–117)
ALT SERPL-CCNC: 29 U/L (ref 12–78)
ANION GAP SERPL CALC-SCNC: 7 MMOL/L (ref 5–15)
AST SERPL-CCNC: 22 U/L (ref 15–37)
BASOPHILS # BLD: 0 K/UL (ref 0–0.1)
BASOPHILS NFR BLD: 1 % (ref 0–1)
BILIRUB SERPL-MCNC: 0.2 MG/DL (ref 0.2–1)
BUN SERPL-MCNC: 19 MG/DL (ref 6–20)
BUN/CREAT SERPL: 22 (ref 12–20)
CALCIUM SERPL-MCNC: 9.3 MG/DL (ref 8.5–10.1)
CHLORIDE SERPL-SCNC: 105 MMOL/L (ref 97–108)
CO2 SERPL-SCNC: 29 MMOL/L (ref 21–32)
CREAT SERPL-MCNC: 0.88 MG/DL (ref 0.55–1.02)
DIFFERENTIAL METHOD BLD: ABNORMAL
EOSINOPHIL # BLD: 0.1 K/UL (ref 0–0.4)
EOSINOPHIL NFR BLD: 2 % (ref 0–7)
ERYTHROCYTE [DISTWIDTH] IN BLOOD BY AUTOMATED COUNT: 13.3 % (ref 11.5–14.5)
GLOBULIN SER CALC-MCNC: 2.7 G/DL (ref 2–4)
GLUCOSE SERPL-MCNC: 119 MG/DL (ref 65–100)
HCT VFR BLD AUTO: 34.2 % (ref 35–47)
HGB BLD-MCNC: 11 G/DL (ref 11.5–16)
IMM GRANULOCYTES # BLD AUTO: 0 K/UL (ref 0–0.04)
IMM GRANULOCYTES NFR BLD AUTO: 0 % (ref 0–0.5)
LYMPHOCYTES # BLD: 1.3 K/UL (ref 0.8–3.5)
LYMPHOCYTES NFR BLD: 29 % (ref 12–49)
MCH RBC QN AUTO: 31.4 PG (ref 26–34)
MCHC RBC AUTO-ENTMCNC: 32.2 G/DL (ref 30–36.5)
MCV RBC AUTO: 97.7 FL (ref 80–99)
MONOCYTES # BLD: 0.7 K/UL (ref 0–1)
MONOCYTES NFR BLD: 16 % (ref 5–13)
NEUTS SEG # BLD: 2.4 K/UL (ref 1.8–8)
NEUTS SEG NFR BLD: 52 % (ref 32–75)
NRBC # BLD: 0 K/UL (ref 0–0.01)
NRBC BLD-RTO: 0 PER 100 WBC
PLATELET # BLD AUTO: 169 K/UL (ref 150–400)
PMV BLD AUTO: 11.6 FL (ref 8.9–12.9)
POTASSIUM SERPL-SCNC: 4.2 MMOL/L (ref 3.5–5.1)
PROT SERPL-MCNC: 6.7 G/DL (ref 6.4–8.2)
RBC # BLD AUTO: 3.5 M/UL (ref 3.8–5.2)
SODIUM SERPL-SCNC: 141 MMOL/L (ref 136–145)
WBC # BLD AUTO: 4.6 K/UL (ref 3.6–11)

## 2023-08-06 LAB
INTERPRETATION, OPI7M: POSITIVE
LAB DIRECTOR NAME PROVIDER: NORMAL
LABORATORY COMMENT REPORT: NORMAL
T(ABL1,BCR)B2A2/CONTROL BLD/T: NORMAL
T(ABL1,BCR)B2A2/CONTROL BLD/T: NORMAL %
T(ABL1,BCR)B3A2/CONTROL BLD/T: 0.33 %
T(ABL1,BCR)E1A2/CONTROL BLD/T: NORMAL %

## 2023-08-22 ENCOUNTER — OFFICE VISIT (OUTPATIENT)
Age: 83
End: 2023-08-22
Payer: COMMERCIAL

## 2023-08-22 VITALS
SYSTOLIC BLOOD PRESSURE: 128 MMHG | WEIGHT: 143.2 LBS | DIASTOLIC BLOOD PRESSURE: 71 MMHG | OXYGEN SATURATION: 97 % | HEART RATE: 65 BPM | TEMPERATURE: 97.6 F | HEIGHT: 61 IN | RESPIRATION RATE: 18 BRPM | BODY MASS INDEX: 27.03 KG/M2

## 2023-08-22 DIAGNOSIS — Z51.11 ENCOUNTER FOR ANTINEOPLASTIC CHEMOTHERAPY: ICD-10-CM

## 2023-08-22 DIAGNOSIS — I10 PRIMARY HYPERTENSION: ICD-10-CM

## 2023-08-22 DIAGNOSIS — R21 RASH: ICD-10-CM

## 2023-08-22 DIAGNOSIS — C92.10 CHRONIC MYELOID LEUKEMIA, BCR/ABL-POSITIVE, NOT HAVING ACHIEVED REMISSION (HCC): Primary | ICD-10-CM

## 2023-08-22 PROCEDURE — 99215 OFFICE O/P EST HI 40 MIN: CPT | Performed by: INTERNAL MEDICINE

## 2023-08-22 PROCEDURE — 3078F DIAST BP <80 MM HG: CPT | Performed by: INTERNAL MEDICINE

## 2023-08-22 PROCEDURE — 3074F SYST BP LT 130 MM HG: CPT | Performed by: INTERNAL MEDICINE

## 2023-08-22 PROCEDURE — 1123F ACP DISCUSS/DSCN MKR DOCD: CPT | Performed by: INTERNAL MEDICINE

## 2023-08-22 ASSESSMENT — PATIENT HEALTH QUESTIONNAIRE - PHQ9
SUM OF ALL RESPONSES TO PHQ9 QUESTIONS 1 & 2: 0
1. LITTLE INTEREST OR PLEASURE IN DOING THINGS: 0
SUM OF ALL RESPONSES TO PHQ QUESTIONS 1-9: 0
SUM OF ALL RESPONSES TO PHQ QUESTIONS 1-9: 0
2. FEELING DOWN, DEPRESSED OR HOPELESS: 0
SUM OF ALL RESPONSES TO PHQ QUESTIONS 1-9: 0
SUM OF ALL RESPONSES TO PHQ QUESTIONS 1-9: 0

## 2023-09-18 DIAGNOSIS — C92.10 CHRONIC MYELOID LEUKEMIA, BCR/ABL-POSITIVE, NOT HAVING ACHIEVED REMISSION (HCC): Primary | ICD-10-CM

## 2023-09-18 RX ORDER — IMATINIB MESYLATE 100 MG/1
200 TABLET, FILM COATED ORAL DAILY
Qty: 60 TABLET | Refills: 2 | Status: ACTIVE | OUTPATIENT
Start: 2023-09-18

## 2023-09-18 NOTE — TELEPHONE ENCOUNTER
Patient spouse called and stated that he got a message from the patients pharmacy saying that they need an updated prescription for the patients imatinib medication. Spouse stated that the patient pharmacy [facility] [place] [facility].            # [phone]

## 2023-10-31 NOTE — PROGRESS NOTES
[id number] [facility] at [address] [place] [address]  [phone]    Hematology / Oncology Established Visit    Reason for Visit:   Ely Benitez is a 82 y.o. female who is seen for follow up of CML. PCP is Dr. Yanique Hernandez.     Hematology Oncology Treatment History:     Diagnosis: Chronic myelogenous leukemia (CML)    Stage: N/A    Pathology:   - 8/1/20 Peripheral blood BCR-ABL1 FISH: 84% of nuclei positive for BCR/ABL1 fusion    -8/28/20 Bone marrow biopsy: Chronic myeloid leukemia, BCR-ABL1-positive, chronic phase   The bone marrow biopsy is 90% cellular and adequate for evaluation. The myeloid series appears hyperplastic and left-shifted with increased proportion of promyelocytes and myelocytes. Blasts do not appear increased. The erythroid series is reduced in number but progresses through the full maturation sequence without significant dysplasia. Megakaryocytes are increased in number with dwarf forms seen. Plasma cells and lymphocytes do not appear increased in number.   -Cytogenetics: Karyotype: 46,XX,t(9;22)(q34.1;q11.2)[20]; Interpretation: ABNORMAL FEMALE KARYOTYPE   The t(9;22)(q34.1;q11.2), resulting in formation of the [place] chromosome, is observed in 90-95% of cases with chronic myeloid leukemia, BCR-ABL1 positive. It is also observed in acute lymphoblastic leukemia/lymphoma and mixed phenotype acute leukemia (MPAL) with t(9;22)(q34.1;q11.2);BCR-ABL1 and in 1-2% of AML. These results should be nterpreted in the context of clinical and histopathologic findings. .    Prior Treatment: Dasatinib 100mg PO daily, started 9/29/2020 - mid Oct 2020 (discontinued for mood changes, fluid retention). Current Treatment: Gleevec 200mg PO daily     History of Present Illness:   Ely Benitez is a 82 y.o. female with HTN, Hyperlipidemia, allergic rhinitis, GERD comes in for follow up of CML. Per PCP notes, pt has had fatigue for past 4 months.  Recently was treated with UTI in late June 2020

## 2023-11-07 ENCOUNTER — OFFICE VISIT (OUTPATIENT)
Age: 83
End: 2023-11-07
Payer: COMMERCIAL

## 2023-11-07 VITALS
BODY MASS INDEX: 27.38 KG/M2 | RESPIRATION RATE: 18 BRPM | HEART RATE: 71 BPM | WEIGHT: 145 LBS | SYSTOLIC BLOOD PRESSURE: 136 MMHG | TEMPERATURE: 98.6 F | HEIGHT: 61 IN | DIASTOLIC BLOOD PRESSURE: 71 MMHG | OXYGEN SATURATION: 98 %

## 2023-11-07 DIAGNOSIS — K52.1 DIARRHEA DUE TO DRUG: ICD-10-CM

## 2023-11-07 DIAGNOSIS — C92.10 CHRONIC MYELOID LEUKEMIA, BCR/ABL-POSITIVE, NOT HAVING ACHIEVED REMISSION (HCC): Primary | ICD-10-CM

## 2023-11-07 DIAGNOSIS — Z51.11 ENCOUNTER FOR ANTINEOPLASTIC CHEMOTHERAPY: ICD-10-CM

## 2023-11-07 DIAGNOSIS — I10 PRIMARY HYPERTENSION: ICD-10-CM

## 2023-11-07 DIAGNOSIS — R21 RASH: ICD-10-CM

## 2023-11-07 PROCEDURE — 1123F ACP DISCUSS/DSCN MKR DOCD: CPT | Performed by: INTERNAL MEDICINE

## 2023-11-07 PROCEDURE — 3075F SYST BP GE 130 - 139MM HG: CPT | Performed by: INTERNAL MEDICINE

## 2023-11-07 PROCEDURE — 99215 OFFICE O/P EST HI 40 MIN: CPT | Performed by: INTERNAL MEDICINE

## 2023-11-07 PROCEDURE — 3078F DIAST BP <80 MM HG: CPT | Performed by: INTERNAL MEDICINE

## 2023-11-07 RX ORDER — IMATINIB MESYLATE 100 MG/1
200 TABLET, FILM COATED ORAL DAILY
Qty: 60 TABLET | Refills: 2 | Status: ACTIVE | OUTPATIENT
Start: 2023-11-07

## 2023-11-07 ASSESSMENT — PATIENT HEALTH QUESTIONNAIRE - PHQ9
SUM OF ALL RESPONSES TO PHQ QUESTIONS 1-9: 0
2. FEELING DOWN, DEPRESSED OR HOPELESS: 0
1. LITTLE INTEREST OR PLEASURE IN DOING THINGS: 0
SUM OF ALL RESPONSES TO PHQ QUESTIONS 1-9: 0
SUM OF ALL RESPONSES TO PHQ QUESTIONS 1-9: 0
SUM OF ALL RESPONSES TO PHQ9 QUESTIONS 1 & 2: 0
SUM OF ALL RESPONSES TO PHQ QUESTIONS 1-9: 0

## 2023-12-26 ENCOUNTER — TELEPHONE (OUTPATIENT)
Age: 83
End: 2023-12-26

## 2023-12-26 DIAGNOSIS — C92.10 CHRONIC MYELOID LEUKEMIA, BCR/ABL-POSITIVE, NOT HAVING ACHIEVED REMISSION (HCC): ICD-10-CM

## 2023-12-26 RX ORDER — IMATINIB MESYLATE 100 MG/1
200 TABLET, FILM COATED ORAL DAILY
Qty: 60 TABLET | Refills: 2 | Status: ACTIVE | OUTPATIENT
Start: 2023-12-26

## 2023-12-26 NOTE — TELEPHONE ENCOUNTER
Cliff Mueller at [facility]  [phone]    12/26/23 3:38 PM EST -Placed call to patient per NP request. Advised patient that medication refills were called into pharmacy.  Patient voiced understanding, no further questions or concerns

## 2024-01-16 DIAGNOSIS — C92.10 CHRONIC MYELOID LEUKEMIA, BCR/ABL-POSITIVE, NOT HAVING ACHIEVED REMISSION (HCC): ICD-10-CM

## 2024-01-17 LAB
ALBUMIN SERPL-MCNC: 4.6 G/DL (ref 3.7–4.7)
ALBUMIN/GLOB SERPL: 2.3 {RATIO} (ref 1.2–2.2)
ALP SERPL-CCNC: 62 IU/L (ref 44–121)
ALT SERPL-CCNC: 17 IU/L (ref 0–32)
AST SERPL-CCNC: 21 IU/L (ref 0–40)
BASOPHILS # BLD AUTO: 0 X10E3/UL (ref 0–0.2)
BASOPHILS NFR BLD AUTO: 1 %
BILIRUB SERPL-MCNC: <0.2 MG/DL (ref 0–1.2)
BUN SERPL-MCNC: 16 MG/DL (ref 8–27)
BUN/CREAT SERPL: 26 (ref 12–28)
CALCIUM SERPL-MCNC: 9.4 MG/DL (ref 8.7–10.3)
CHLORIDE SERPL-SCNC: 102 MMOL/L (ref 96–106)
CO2 SERPL-SCNC: 25 MMOL/L (ref 20–29)
CREAT SERPL-MCNC: 0.61 MG/DL (ref 0.57–1)
EGFRCR SERPLBLD CKD-EPI 2021: 90 ML/MIN/1.73
EOSINOPHIL # BLD AUTO: 0.1 X10E3/UL (ref 0–0.4)
EOSINOPHIL NFR BLD AUTO: 2 %
ERYTHROCYTE [DISTWIDTH] IN BLOOD BY AUTOMATED COUNT: 13 % (ref 11.7–15.4)
GLOBULIN SER CALC-MCNC: 2 G/DL (ref 1.5–4.5)
GLUCOSE SERPL-MCNC: 108 MG/DL (ref 70–99)
HCT VFR BLD AUTO: 31.5 % (ref 34–46.6)
HGB BLD-MCNC: 10.6 G/DL (ref 11.1–15.9)
IMM GRANULOCYTES # BLD AUTO: 0 X10E3/UL (ref 0–0.1)
IMM GRANULOCYTES NFR BLD AUTO: 0 %
LYMPHOCYTES # BLD AUTO: 1.3 X10E3/UL (ref 0.7–3.1)
LYMPHOCYTES NFR BLD AUTO: 23 %
MCH RBC QN AUTO: 31.3 PG (ref 26.6–33)
MCHC RBC AUTO-ENTMCNC: 33.7 G/DL (ref 31.5–35.7)
MCV RBC AUTO: 93 FL (ref 79–97)
MONOCYTES # BLD AUTO: 0.7 X10E3/UL (ref 0.1–0.9)
MONOCYTES NFR BLD AUTO: 13 %
NEUTROPHILS # BLD AUTO: 3.4 X10E3/UL (ref 1.4–7)
NEUTROPHILS NFR BLD AUTO: 61 %
PLATELET # BLD AUTO: 221 X10E3/UL (ref 150–450)
POTASSIUM SERPL-SCNC: 4.4 MMOL/L (ref 3.5–5.2)
PROT SERPL-MCNC: 6.6 G/DL (ref 6–8.5)
RBC # BLD AUTO: 3.39 X10E6/UL (ref 3.77–5.28)
SODIUM SERPL-SCNC: 138 MMOL/L (ref 134–144)
WBC # BLD AUTO: 5.6 X10E3/UL (ref 3.4–10.8)

## 2024-01-25 LAB
INTERPRETATION: POSITIVE
LAB DIRECTOR NAME PROVIDER: ABNORMAL
LABORATORY COMMENT REPORT: ABNORMAL
REF LAB TEST METHOD: ABNORMAL
T(ABL1,BCR)B2A2/CONTROL BLD/T: 0.12 %
T(ABL1,BCR)B3A2/CONTROL BLD/T: 2.24 %
T(ABL1,BCR)E1A2/CONTROL BLD/T: ABNORMAL %

## 2024-02-05 ENCOUNTER — OFFICE VISIT (OUTPATIENT)
Age: 84
End: 2024-02-05
Payer: COMMERCIAL

## 2024-02-05 VITALS
TEMPERATURE: 97.2 F | DIASTOLIC BLOOD PRESSURE: 68 MMHG | HEIGHT: 61 IN | WEIGHT: 140 LBS | SYSTOLIC BLOOD PRESSURE: 138 MMHG | RESPIRATION RATE: 16 BRPM | HEART RATE: 70 BPM | OXYGEN SATURATION: 98 % | BODY MASS INDEX: 26.43 KG/M2

## 2024-02-05 DIAGNOSIS — R21 RASH: ICD-10-CM

## 2024-02-05 DIAGNOSIS — C92.10 CHRONIC MYELOID LEUKEMIA, BCR/ABL-POSITIVE, NOT HAVING ACHIEVED REMISSION (HCC): Primary | ICD-10-CM

## 2024-02-05 DIAGNOSIS — Z51.11 ENCOUNTER FOR ANTINEOPLASTIC CHEMOTHERAPY: ICD-10-CM

## 2024-02-05 DIAGNOSIS — M79.18 GLUTEAL PAIN: ICD-10-CM

## 2024-02-05 DIAGNOSIS — D64.9 NORMOCYTIC ANEMIA: ICD-10-CM

## 2024-02-05 DIAGNOSIS — K52.1 DIARRHEA DUE TO DRUG: ICD-10-CM

## 2024-02-05 PROCEDURE — 99215 OFFICE O/P EST HI 40 MIN: CPT | Performed by: INTERNAL MEDICINE

## 2024-02-05 PROCEDURE — 1123F ACP DISCUSS/DSCN MKR DOCD: CPT | Performed by: INTERNAL MEDICINE

## 2024-02-05 RX ORDER — PREDNISONE 20 MG/1
TABLET ORAL
COMMUNITY
Start: 2024-01-30

## 2024-02-05 RX ORDER — HYDROCODONE BITARTRATE AND ACETAMINOPHEN 5; 325 MG/1; MG/1
TABLET ORAL
COMMUNITY
Start: 2024-01-30

## 2024-02-05 ASSESSMENT — PATIENT HEALTH QUESTIONNAIRE - PHQ9
1. LITTLE INTEREST OR PLEASURE IN DOING THINGS: 0
SUM OF ALL RESPONSES TO PHQ QUESTIONS 1-9: 0
2. FEELING DOWN, DEPRESSED OR HOPELESS: 0
SUM OF ALL RESPONSES TO PHQ9 QUESTIONS 1 & 2: 0
SUM OF ALL RESPONSES TO PHQ QUESTIONS 1-9: 0

## 2024-02-05 NOTE — PROGRESS NOTES
[facility] [place] [facility]  Medical Oncology at [place]  [phone]    Hematology / Oncology Established Visit    Reason for Visit:   Shira Yost is a 83 y.o. female who is seen for follow up of CML. PCP is Dr. Rosmery Barnes.     Hematology Oncology Treatment History:     Diagnosis: Chronic myelogenous leukemia (CML)    Stage: N/A    Pathology:   - 8/1/20 Peripheral blood BCR-ABL1 FISH: 84% of nuclei positive for BCR/ABL1 fusion    -8/28/20 Bone marrow biopsy: Chronic myeloid leukemia, BCR-ABL1-positive, chronic phase   The bone marrow biopsy is 90% cellular and adequate for evaluation. The myeloid series appears hyperplastic and left-shifted with increased proportion of promyelocytes and myelocytes. Blasts do not appear increased. The erythroid series is reduced in number but progresses through the full maturation sequence without significant dysplasia. Megakaryocytes are increased in number with dwarf forms seen. Plasma cells and lymphocytes do not appear increased in number.   -Cytogenetics: Karyotype: 46,XX,t(9;22)(q34.1;q11.2)[20]; Interpretation: ABNORMAL FEMALE KARYOTYPE   The t(9;22)(q34.1;q11.2), resulting in formation of the [place] chromosome, is observed in 90-95% of cases with chronic myeloid leukemia, BCR-ABL1 positive. It is also observed in acute lymphoblastic leukemia/lymphoma and mixed phenotype acute leukemia (MPAL) with t(9;22)(q34.1;q11.2);BCR-ABL1 and in 1-2% of AML. These results should be nterpreted in the context of clinical and histopathologic findings..    Prior Treatment: Dasatinib 100mg PO daily, started 9/29/2020 - mid Oct 2020 (discontinued for mood changes, fluid retention).    Current Treatment: Gleevec 200mg PO daily     History of Present Illness:   Shira Yost is a 83 y.o. female with HTN, Hyperlipidemia, allergic rhinitis, GERD comes in for follow up of CML. Per PCP notes, pt has had fatigue for past 4 months. Recently was treated with UTI in late June 2020

## 2024-02-05 NOTE — PROGRESS NOTES
Chief Complaint   Patient presents with    Follow-up           Vitals:    02/05/24 1340   BP: 138/68   Pulse: 70   Resp: 16   Temp: 97.2 °F (36.2 °C)   SpO2: 98%            1. Have you been to the ER, urgent care clinic since your last visit?  Hospitalized since your last visit?  No  2. Have you seen or consulted any other health care providers outside of the [facility] since your last visit?  Include any pap smears or colon screening. No

## 2024-02-18 ENCOUNTER — HOSPITAL ENCOUNTER (OUTPATIENT)
Facility: HOSPITAL | Age: 84
Discharge: HOME | End: 2024-02-21
Attending: ORTHOPAEDIC SURGERY
Payer: COMMERCIAL

## 2024-02-18 DIAGNOSIS — M54.16 LUMBAR RADICULOPATHY: ICD-10-CM

## 2024-02-18 PROCEDURE — 72148 MRI LUMBAR SPINE W/O DYE: CPT

## 2024-04-16 LAB
BASOPHILS # BLD AUTO: 0 X10E3/UL (ref 0–0.2)
BASOPHILS NFR BLD AUTO: 1 %
EOSINOPHIL # BLD AUTO: 0.1 X10E3/UL (ref 0–0.4)
EOSINOPHIL NFR BLD AUTO: 2 %
ERYTHROCYTE [DISTWIDTH] IN BLOOD BY AUTOMATED COUNT: 13.3 % (ref 11.7–15.4)
HCT VFR BLD AUTO: 30.7 % (ref 34–46.6)
HGB BLD-MCNC: 10.5 G/DL (ref 11.1–15.9)
IMM GRANULOCYTES # BLD AUTO: 0 X10E3/UL (ref 0–0.1)
IMM GRANULOCYTES NFR BLD AUTO: 0 %
LYMPHOCYTES # BLD AUTO: 1.1 X10E3/UL (ref 0.7–3.1)
LYMPHOCYTES NFR BLD AUTO: 20 %
MCH RBC QN AUTO: 32.1 PG (ref 26.6–33)
MCHC RBC AUTO-ENTMCNC: 34.2 G/DL (ref 31.5–35.7)
MCV RBC AUTO: 94 FL (ref 79–97)
MONOCYTES # BLD AUTO: 0.9 X10E3/UL (ref 0.1–0.9)
MONOCYTES NFR BLD AUTO: 16 %
NEUTROPHILS # BLD AUTO: 3.5 X10E3/UL (ref 1.4–7)
NEUTROPHILS NFR BLD AUTO: 61 %
PLATELET # BLD AUTO: 248 X10E3/UL (ref 150–450)
RBC # BLD AUTO: 3.27 X10E6/UL (ref 3.77–5.28)
WBC # BLD AUTO: 5.5 X10E3/UL (ref 3.4–10.8)

## 2024-04-17 LAB
ALBUMIN SERPL-MCNC: 4.6 G/DL (ref 3.7–4.7)
ALBUMIN/GLOB SERPL: 2.9 {RATIO} (ref 1.2–2.2)
ALP SERPL-CCNC: 58 IU/L (ref 44–121)
ALT SERPL-CCNC: 21 IU/L (ref 0–32)
AST SERPL-CCNC: 20 IU/L (ref 0–40)
BILIRUB SERPL-MCNC: 0.2 MG/DL (ref 0–1.2)
BUN SERPL-MCNC: 16 MG/DL (ref 8–27)
BUN/CREAT SERPL: 21 (ref 12–28)
CALCIUM SERPL-MCNC: 9.6 MG/DL (ref 8.7–10.3)
CHLORIDE SERPL-SCNC: 100 MMOL/L (ref 96–106)
CO2 SERPL-SCNC: 19 MMOL/L (ref 20–29)
CREAT SERPL-MCNC: 0.78 MG/DL (ref 0.57–1)
EGFRCR SERPLBLD CKD-EPI 2021: 76 ML/MIN/1.73
GLOBULIN SER CALC-MCNC: 1.6 G/DL (ref 1.5–4.5)
GLUCOSE SERPL-MCNC: 133 MG/DL (ref 70–99)
POTASSIUM SERPL-SCNC: 4.5 MMOL/L (ref 3.5–5.2)
PROT SERPL-MCNC: 6.2 G/DL (ref 6–8.5)
SODIUM SERPL-SCNC: 139 MMOL/L (ref 134–144)

## 2024-04-23 LAB
INTERPRETATION: POSITIVE
LAB DIRECTOR NAME PROVIDER: ABNORMAL
LABORATORY COMMENT REPORT: ABNORMAL
REF LAB TEST METHOD: ABNORMAL
T(ABL1,BCR)B2A2/CONTROL BLD/T: ABNORMAL %
T(ABL1,BCR)B3A2/CONTROL BLD/T: 0.25 %
T(ABL1,BCR)E1A2/CONTROL BLD/T: ABNORMAL %

## 2024-04-23 NOTE — PROGRESS NOTES
[facility] [place]  Medical Oncology at [place]  [phone]    Hematology / Oncology Established Visit    Reason for Visit:   Shira Yost is a 83 y.o. female who is seen for follow up of CML. PCP is Dr. Rosmery Barnes.     Hematology Oncology Treatment History:     Diagnosis: Chronic myelogenous leukemia (CML)    Stage: N/A    Pathology:   - 8/1/20 Peripheral blood BCR-ABL1 FISH: 84% of nuclei positive for BCR/ABL1 fusion    -8/28/20 Bone marrow biopsy: Chronic myeloid leukemia, BCR-ABL1-positive, chronic phase   The bone marrow biopsy is 90% cellular and adequate for evaluation. The myeloid series appears hyperplastic and left-shifted with increased proportion of promyelocytes and myelocytes. Blasts do not appear increased. The erythroid series is reduced in number but progresses through the full maturation sequence without significant dysplasia. Megakaryocytes are increased in number with dwarf forms seen. Plasma cells and lymphocytes do not appear increased in number.   -Cytogenetics: Karyotype: 46,XX,t(9;22)(q34.1;q11.2)[20]; Interpretation: ABNORMAL FEMALE KARYOTYPE   The t(9;22)(q34.1;q11.2), resulting in formation of the [place] chromosome, is observed in 90-95% of cases with chronic myeloid leukemia, BCR-ABL1 positive. It is also observed in acute lymphoblastic leukemia/lymphoma and mixed phenotype acute leukemia (MPAL) with t(9;22)(q34.1;q11.2);BCR-ABL1 and in 1-2% of AML. These results should be nterpreted in the context of clinical and histopathologic findings..    Prior Treatment: Dasatinib 100mg PO daily, started 9/29/2020 - mid Oct 2020 (discontinued for mood changes, fluid retention).    Current Treatment: Gleevec 200mg PO daily     History of Present Illness:   Shira Yost is a 83 y.o. female with HTN, Hyperlipidemia, allergic rhinitis, GERD comes in for follow up of CML. Per PCP notes, pt has had fatigue for past 4 months. Recently was treated with UTI in late June 2020

## 2024-04-30 ENCOUNTER — OFFICE VISIT (OUTPATIENT)
Age: 84
End: 2024-04-30
Payer: COMMERCIAL

## 2024-04-30 VITALS
WEIGHT: 146.2 LBS | TEMPERATURE: 97.6 F | OXYGEN SATURATION: 97 % | HEIGHT: 61 IN | DIASTOLIC BLOOD PRESSURE: 65 MMHG | RESPIRATION RATE: 16 BRPM | SYSTOLIC BLOOD PRESSURE: 149 MMHG | HEART RATE: 67 BPM | BODY MASS INDEX: 27.6 KG/M2

## 2024-04-30 DIAGNOSIS — D64.9 NORMOCYTIC ANEMIA, NOT DUE TO BLOOD LOSS: ICD-10-CM

## 2024-04-30 DIAGNOSIS — K52.9 CHRONIC DIARRHEA: ICD-10-CM

## 2024-04-30 DIAGNOSIS — C92.10 CML (CHRONIC MYELOCYTIC LEUKEMIA) (HCC): ICD-10-CM

## 2024-04-30 DIAGNOSIS — R21 RASH: ICD-10-CM

## 2024-04-30 DIAGNOSIS — I10 PRIMARY HYPERTENSION: ICD-10-CM

## 2024-04-30 DIAGNOSIS — C92.10 CML (CHRONIC MYELOCYTIC LEUKEMIA) (HCC): Primary | ICD-10-CM

## 2024-04-30 DIAGNOSIS — Z79.899 ENCOUNTER FOR LONG-TERM (CURRENT) USE OF HIGH-RISK MEDICATION: ICD-10-CM

## 2024-04-30 PROCEDURE — 3078F DIAST BP <80 MM HG: CPT | Performed by: INTERNAL MEDICINE

## 2024-04-30 PROCEDURE — 99215 OFFICE O/P EST HI 40 MIN: CPT | Performed by: INTERNAL MEDICINE

## 2024-04-30 PROCEDURE — 1123F ACP DISCUSS/DSCN MKR DOCD: CPT | Performed by: INTERNAL MEDICINE

## 2024-04-30 PROCEDURE — 3077F SYST BP >= 140 MM HG: CPT | Performed by: INTERNAL MEDICINE

## 2024-04-30 PROCEDURE — G2211 COMPLEX E/M VISIT ADD ON: HCPCS | Performed by: INTERNAL MEDICINE

## 2024-04-30 ASSESSMENT — PATIENT HEALTH QUESTIONNAIRE - PHQ9
2. FEELING DOWN, DEPRESSED OR HOPELESS: NOT AT ALL
SUM OF ALL RESPONSES TO PHQ QUESTIONS 1-9: 0
SUM OF ALL RESPONSES TO PHQ QUESTIONS 1-9: 0
SUM OF ALL RESPONSES TO PHQ9 QUESTIONS 1 & 2: 0
SUM OF ALL RESPONSES TO PHQ QUESTIONS 1-9: 0
SUM OF ALL RESPONSES TO PHQ QUESTIONS 1-9: 0
1. LITTLE INTEREST OR PLEASURE IN DOING THINGS: NOT AT ALL

## 2024-04-30 NOTE — PROGRESS NOTES
Chief Complaint   Patient presents with    Follow-up           Vitals:    04/30/24 1337   BP: (!) 149/65   Pulse: 67   Resp: 16   Temp: 97.6 °F (36.4 °C)   SpO2: 97%            1. Have you been to the ER, urgent care clinic since your last visit?  Hospitalized since your last visit?  No  2. Have you seen or consulted any other health care providers outside of the [facility] since your last visit?  Include any pap smears or colon screening. No

## 2024-05-01 LAB
FERRITIN SERPL-MCNC: 165 NG/ML (ref 8–252)
FOLATE SERPL-MCNC: 53.7 NG/ML (ref 5–21)
IRON SATN MFR SERPL: 25 % (ref 20–50)
IRON SERPL-MCNC: 87 UG/DL (ref 35–150)
RETICS # AUTO: 0.05 M/UL (ref 0.02–0.08)
RETICS/RBC NFR AUTO: 1.6 % (ref 0.7–2.1)
TIBC SERPL-MCNC: 355 UG/DL (ref 250–450)
VIT B12 SERPL-MCNC: >2000 PG/ML (ref 193–986)

## 2024-05-03 LAB — METHYLMALONATE SERPL-SCNC: 206 NMOL/L (ref 0–378)

## 2024-05-06 DIAGNOSIS — C92.10 CHRONIC MYELOID LEUKEMIA, BCR/ABL-POSITIVE, NOT HAVING ACHIEVED REMISSION (HCC): ICD-10-CM

## 2024-05-06 RX ORDER — IMATINIB MESYLATE 100 MG/1
200 TABLET, FILM COATED ORAL DAILY
Qty: 60 TABLET | Refills: 2 | Status: ACTIVE | OUTPATIENT
Start: 2024-05-06

## 2024-05-06 NOTE — TELEPHONE ENCOUNTER
----- Message from Mary Rosas sent at 5/6/2024 10:17 AM EDT -----  Regarding: RE: Refill for Imatinib, 100mg tabs  Contact: [phone]  So I don't see where this pt was on assistance before and unfortunately with Gleevec there is not assistance available anymore for new applications so [facility] would be most likely the best option unless [facility] finds any grants open.   ----- Message -----  From: Kristin Dupree RN  Sent: 5/6/2024  10:05 AM EDT  To: Mary Rosas  Subject: FW: Refill for Imatinib, 100mg tabs              Before I pend script to [facility], but patient/spouse mentioned a form. Was not sure if you had worked with this patient in the past for any kind of assistance?     ----- Message -----  From: Cassandra Chaney LPN  Sent: 5/6/2024   8:40 AM EDT  To: Kristin Dupree RN; Payton Vital LPN  Subject: FW: Refill for Imatinib, 100mg tabs                ----- Message -----  From: Shira Yost \"Casie\"  Sent: 5/4/2024  10:54 AM EDT  To: [facility] Clinical Staff  Subject: Refill for Imatinib, 100mg tabs                  Hi, Need a new script for my next order.  Do you need me to send the form from [facility]?

## 2024-07-16 LAB
ALBUMIN SERPL-MCNC: 4.4 G/DL (ref 3.7–4.7)
ALP SERPL-CCNC: 55 IU/L (ref 44–121)
ALT SERPL-CCNC: 19 IU/L (ref 0–32)
AST SERPL-CCNC: 18 IU/L (ref 0–40)
BASOPHILS # BLD AUTO: 0 X10E3/UL (ref 0–0.2)
BASOPHILS NFR BLD AUTO: 0 %
BILIRUB SERPL-MCNC: <0.2 MG/DL (ref 0–1.2)
BUN SERPL-MCNC: 18 MG/DL (ref 8–27)
BUN/CREAT SERPL: 24 (ref 12–28)
CALCIUM SERPL-MCNC: 9.7 MG/DL (ref 8.7–10.3)
CHLORIDE SERPL-SCNC: 101 MMOL/L (ref 96–106)
CO2 SERPL-SCNC: 25 MMOL/L (ref 20–29)
CREAT SERPL-MCNC: 0.74 MG/DL (ref 0.57–1)
EGFRCR SERPLBLD CKD-EPI 2021: 81 ML/MIN/1.73
EOSINOPHIL # BLD AUTO: 0.1 X10E3/UL (ref 0–0.4)
EOSINOPHIL NFR BLD AUTO: 2 %
ERYTHROCYTE [DISTWIDTH] IN BLOOD BY AUTOMATED COUNT: 13.1 % (ref 11.7–15.4)
FERRITIN SERPL-MCNC: 214 NG/ML (ref 15–150)
GLOBULIN SER CALC-MCNC: 1.9 G/DL (ref 1.5–4.5)
GLUCOSE SERPL-MCNC: 111 MG/DL (ref 70–99)
HCT VFR BLD AUTO: 30.2 % (ref 34–46.6)
HGB BLD-MCNC: 10.3 G/DL (ref 11.1–15.9)
IMM GRANULOCYTES # BLD AUTO: 0 X10E3/UL (ref 0–0.1)
IMM GRANULOCYTES NFR BLD AUTO: 0 %
IRON SATN MFR SERPL: 24 % (ref 15–55)
IRON SERPL-MCNC: 80 UG/DL (ref 27–139)
LYMPHOCYTES # BLD AUTO: 1.1 X10E3/UL (ref 0.7–3.1)
LYMPHOCYTES NFR BLD AUTO: 19 %
MCH RBC QN AUTO: 31.9 PG (ref 26.6–33)
MCHC RBC AUTO-ENTMCNC: 34.1 G/DL (ref 31.5–35.7)
MCV RBC AUTO: 94 FL (ref 79–97)
MONOCYTES # BLD AUTO: 0.8 X10E3/UL (ref 0.1–0.9)
MONOCYTES NFR BLD AUTO: 14 %
NEUTROPHILS # BLD AUTO: 3.6 X10E3/UL (ref 1.4–7)
NEUTROPHILS NFR BLD AUTO: 65 %
PLATELET # BLD AUTO: 268 X10E3/UL (ref 150–450)
POTASSIUM SERPL-SCNC: 4.7 MMOL/L (ref 3.5–5.2)
PROT SERPL-MCNC: 6.3 G/DL (ref 6–8.5)
RBC # BLD AUTO: 3.23 X10E6/UL (ref 3.77–5.28)
SODIUM SERPL-SCNC: 140 MMOL/L (ref 134–144)
TIBC SERPL-MCNC: 330 UG/DL (ref 250–450)
UIBC SERPL-MCNC: 250 UG/DL (ref 118–369)
WBC # BLD AUTO: 5.6 X10E3/UL (ref 3.4–10.8)

## 2024-07-19 LAB
INTERPRETATION: POSITIVE
LAB DIRECTOR NAME PROVIDER: ABNORMAL
LABORATORY COMMENT REPORT: ABNORMAL
REF LAB TEST METHOD: ABNORMAL
T(ABL1,BCR)B2A2/CONTROL BLD/T: ABNORMAL %
T(ABL1,BCR)B3A2/CONTROL BLD/T: 0.01 %
T(ABL1,BCR)E1A2/CONTROL BLD/T: ABNORMAL %

## 2024-07-25 DIAGNOSIS — C92.10 CHRONIC MYELOID LEUKEMIA, BCR/ABL-POSITIVE, NOT HAVING ACHIEVED REMISSION (HCC): ICD-10-CM

## 2024-07-25 RX ORDER — IMATINIB MESYLATE 100 MG/1
200 TABLET, FILM COATED ORAL DAILY
Qty: 60 TABLET | Refills: 2 | Status: ACTIVE | OUTPATIENT
Start: 2024-07-25

## 2024-07-25 NOTE — TELEPHONE ENCOUNTER
----- Message from LUIS Isaacs NP sent at 7/25/2024  9:04 AM EDT -----  Regarding: RE: Imatinib 100 mg tabs.  Contact: [phone]  Yes, continue current dose. Thank you   ----- Message -----  From: Kristin Dupree RN  Sent: 7/25/2024   8:47 AM EDT  To: LUIS Isaacs NP  Subject: FW: Imatinib 100 mg tabs.                        Please advise, okay to pend rx for Gleevec 200 mg daily? Patient has appt next week and I just wanted to ensure the dose wasn't changing. Thank you!    ----- Message -----  From: Soila Vick RN  Sent: 7/24/2024   5:37 PM EDT  To: Kristin Dupree RN  Subject: FW: Imatinib 100 mg tabs.                          ----- Message -----  From: Shira Yost \"Casie\"  Sent: 7/24/2024   5:08 PM EDT  To: [facility] Clinical Staff  Subject: Imatinib 100 mg tabs.                            Hi, Got a message from [facility] that Casie's prescription is done and she needs a new one for the next month.  Thanks! Be seeing you next week for the followup of the last results.

## 2024-07-30 ENCOUNTER — OFFICE VISIT (OUTPATIENT)
Age: 84
End: 2024-07-30
Payer: COMMERCIAL

## 2024-07-30 VITALS
WEIGHT: 149.4 LBS | BODY MASS INDEX: 28.21 KG/M2 | OXYGEN SATURATION: 98 % | RESPIRATION RATE: 18 BRPM | DIASTOLIC BLOOD PRESSURE: 70 MMHG | TEMPERATURE: 97.6 F | SYSTOLIC BLOOD PRESSURE: 131 MMHG | HEART RATE: 65 BPM | HEIGHT: 61 IN

## 2024-07-30 DIAGNOSIS — D64.9 NORMOCYTIC ANEMIA: ICD-10-CM

## 2024-07-30 DIAGNOSIS — Z79.899 ENCOUNTER FOR LONG-TERM (CURRENT) USE OF HIGH-RISK MEDICATION: ICD-10-CM

## 2024-07-30 DIAGNOSIS — R21 RASH: ICD-10-CM

## 2024-07-30 DIAGNOSIS — C92.10 CHRONIC MYELOID LEUKEMIA, BCR/ABL-POSITIVE, NOT HAVING ACHIEVED REMISSION (HCC): Primary | ICD-10-CM

## 2024-07-30 DIAGNOSIS — R60.0 BILATERAL LOWER EXTREMITY EDEMA: ICD-10-CM

## 2024-07-30 PROCEDURE — 1123F ACP DISCUSS/DSCN MKR DOCD: CPT | Performed by: INTERNAL MEDICINE

## 2024-07-30 PROCEDURE — G2211 COMPLEX E/M VISIT ADD ON: HCPCS | Performed by: INTERNAL MEDICINE

## 2024-07-30 PROCEDURE — 99215 OFFICE O/P EST HI 40 MIN: CPT | Performed by: INTERNAL MEDICINE

## 2024-07-30 RX ORDER — BENZOCAINE/MENTHOL 6 MG-10 MG
LOZENGE MUCOUS MEMBRANE
Qty: 30 G | Refills: 1 | Status: SHIPPED | OUTPATIENT
Start: 2024-07-30 | End: 2024-08-06

## 2024-07-30 ASSESSMENT — PATIENT HEALTH QUESTIONNAIRE - PHQ9
SUM OF ALL RESPONSES TO PHQ QUESTIONS 1-9: 0
SUM OF ALL RESPONSES TO PHQ QUESTIONS 1-9: 0
SUM OF ALL RESPONSES TO PHQ9 QUESTIONS 1 & 2: 0
SUM OF ALL RESPONSES TO PHQ QUESTIONS 1-9: 0
SUM OF ALL RESPONSES TO PHQ QUESTIONS 1-9: 0
2. FEELING DOWN, DEPRESSED OR HOPELESS: NOT AT ALL
1. LITTLE INTEREST OR PLEASURE IN DOING THINGS: NOT AT ALL

## 2024-07-30 NOTE — PROGRESS NOTES
Chief Complaint   Patient presents with    Follow-up           Vitals:    07/30/24 1322   BP: 131/70   Pulse: 65   Resp: 18   Temp: 97.6 °F (36.4 °C)   SpO2: 98%            1. Have you been to the ER, urgent care clinic since your last visit?  Hospitalized since your last visit?  Yes [facility]   2. Have you seen or consulted any other health care providers outside of the [facility] since your last visit?  Include any pap smears or colon screening. No      
Side effects of these medications include nausea, diarrhea, constipation, rash, liver test abnormalities, most of which are generally troublesome in the first 1-2 months, but then can resolve or improve over time. Previously printed patient information on CML for patient.  Labs from 7/15/24 show a decrease in BCR-ABL1 transcripts and is at the goal of 0.1%. WBC is normal, anemia likely due to bone marrow suppression from Gleevec. Has trace-1+ edema in lower extremities. Will continue current treatment, unable to tolerate higher dose.   -- Continue Gleevec 200mg daily and continue this dosing consistently. Avoid antacids.   -- Labs (CBC, CMP, bcr-abl) on 10/14/24 or earlier - will review at next visit  -- Rash present on face and chest, advised low dose topical steroid first prior to restarting Pred 10mg/d. Topical steroid sent to pharmacy.   -- Return in 12 weeks with labs 2 weeks prior, MD visit. (On 10/29/24)    2. H/o Pleural effusions / Pulm edema / LE edema:  Imatinib can cause peripheral edema in up to 10% of cases. Cardiac workup was negative for ischemia. Prior echo showed EF 60-65% and pt may have diastolic dysfunction. Dr. Schuler ordered Lexiscan nuclear stress test but pt has not completed. No longer on Lasix. Has trace-1+ bilateral LE edema with some venous stasis changes. Discussed leg elevation, she declines compression stockings.     3. HTN:   Well controlled on Lisinopril/HCTZ 40-25mg daily. Managed by Dr. Schuler.     4. Health maintenance / vaccine counseling:   Last colonoscopy done 2016. Last mammogram 5/21/19. No further mammograms needed unless symptomatic. Had both COVID vaccines. Declines 2023 flu vaccine or COVID booster.     5. Erythematous rash:   Mild and present on face and chest. Denies pruritus. Manageable on current dose of Gleevec.   -- Advised spot treatment with topical cortisone 1-2 times daily. Rx sent.   -- If not improving with cortisone, will trial prednisone 10mg/d x 5-10 days.

## 2024-07-31 ENCOUNTER — TELEPHONE (OUTPATIENT)
Age: 84
End: 2024-07-31

## 2024-07-31 NOTE — TELEPHONE ENCOUNTER
Called pt to schedule follow up visit, Lvm Return in about 12 weeks (around 10/22/2024) for OLIVERIO rodriguez monitoring .

## 2024-10-13 DIAGNOSIS — C92.10 CHRONIC MYELOID LEUKEMIA, BCR/ABL-POSITIVE, NOT HAVING ACHIEVED REMISSION (HCC): ICD-10-CM

## 2024-10-14 DIAGNOSIS — C92.10 CHRONIC MYELOID LEUKEMIA, BCR/ABL-POSITIVE, NOT HAVING ACHIEVED REMISSION (HCC): ICD-10-CM

## 2024-10-14 LAB
ALBUMIN SERPL-MCNC: 4.2 G/DL (ref 3.5–5)
ALBUMIN/GLOB SERPL: 1.7 (ref 1.1–2.2)
ALP SERPL-CCNC: 55 U/L (ref 45–117)
ALT SERPL-CCNC: 21 U/L (ref 12–78)
ANION GAP SERPL CALC-SCNC: 6 MMOL/L (ref 2–12)
AST SERPL-CCNC: 16 U/L (ref 15–37)
BASOPHILS # BLD: 0 K/UL (ref 0–0.1)
BASOPHILS NFR BLD: 1 % (ref 0–1)
BILIRUB SERPL-MCNC: 0.3 MG/DL (ref 0.2–1)
BUN SERPL-MCNC: 20 MG/DL (ref 6–20)
BUN/CREAT SERPL: 20 (ref 12–20)
CALCIUM SERPL-MCNC: 10 MG/DL (ref 8.5–10.1)
CHLORIDE SERPL-SCNC: 102 MMOL/L (ref 97–108)
CO2 SERPL-SCNC: 30 MMOL/L (ref 21–32)
CREAT SERPL-MCNC: 1.01 MG/DL (ref 0.55–1.02)
DIFFERENTIAL METHOD BLD: ABNORMAL
EOSINOPHIL # BLD: 0.2 K/UL (ref 0–0.4)
EOSINOPHIL NFR BLD: 3 % (ref 0–7)
ERYTHROCYTE [DISTWIDTH] IN BLOOD BY AUTOMATED COUNT: 13.9 % (ref 11.5–14.5)
GLOBULIN SER CALC-MCNC: 2.5 G/DL (ref 2–4)
GLUCOSE SERPL-MCNC: 112 MG/DL (ref 65–100)
HCT VFR BLD AUTO: 30.4 % (ref 35–47)
HGB BLD-MCNC: 9.8 G/DL (ref 11.5–16)
IMM GRANULOCYTES # BLD AUTO: 0 K/UL (ref 0–0.04)
IMM GRANULOCYTES NFR BLD AUTO: 0 % (ref 0–0.5)
LYMPHOCYTES # BLD: 1.4 K/UL (ref 0.8–3.5)
LYMPHOCYTES NFR BLD: 23 % (ref 12–49)
MCH RBC QN AUTO: 31.6 PG (ref 26–34)
MCHC RBC AUTO-ENTMCNC: 32.2 G/DL (ref 30–36.5)
MCV RBC AUTO: 98.1 FL (ref 80–99)
MONOCYTES # BLD: 0.9 K/UL (ref 0–1)
MONOCYTES NFR BLD: 15 % (ref 5–13)
NEUTS SEG # BLD: 3.5 K/UL (ref 1.8–8)
NEUTS SEG NFR BLD: 58 % (ref 32–75)
NRBC # BLD: 0 K/UL (ref 0–0.01)
NRBC BLD-RTO: 0 PER 100 WBC
PLATELET # BLD AUTO: 241 K/UL (ref 150–400)
PMV BLD AUTO: 11.6 FL (ref 8.9–12.9)
POTASSIUM SERPL-SCNC: 4.1 MMOL/L (ref 3.5–5.1)
PROT SERPL-MCNC: 6.7 G/DL (ref 6.4–8.2)
RBC # BLD AUTO: 3.1 M/UL (ref 3.8–5.2)
SODIUM SERPL-SCNC: 138 MMOL/L (ref 136–145)
WBC # BLD AUTO: 6 K/UL (ref 3.6–11)

## 2024-10-21 LAB
INTERPRETATION: POSITIVE
LAB DIRECTOR NAME PROVIDER: ABNORMAL
LABORATORY COMMENT REPORT: ABNORMAL
T(ABL1,BCR)B2A2/CONTROL BLD/T: ABNORMAL
T(ABL1,BCR)B2A2/CONTROL BLD/T: ABNORMAL %
T(ABL1,BCR)B3A2/CONTROL BLD/T: 0.31 %
T(ABL1,BCR)E1A2/CONTROL BLD/T: ABNORMAL %

## 2024-10-28 ENCOUNTER — OFFICE VISIT (OUTPATIENT)
Age: 84
End: 2024-10-28
Payer: MEDICARE

## 2024-10-28 VITALS
DIASTOLIC BLOOD PRESSURE: 65 MMHG | HEART RATE: 74 BPM | BODY MASS INDEX: 28.36 KG/M2 | SYSTOLIC BLOOD PRESSURE: 164 MMHG | TEMPERATURE: 97.6 F | WEIGHT: 150.2 LBS | OXYGEN SATURATION: 96 % | HEIGHT: 61 IN

## 2024-10-28 DIAGNOSIS — R60.0 BILATERAL LOWER EXTREMITY EDEMA: ICD-10-CM

## 2024-10-28 DIAGNOSIS — D64.9 NORMOCYTIC ANEMIA: ICD-10-CM

## 2024-10-28 DIAGNOSIS — C92.10 CHRONIC MYELOID LEUKEMIA, BCR/ABL-POSITIVE, NOT HAVING ACHIEVED REMISSION (HCC): Primary | ICD-10-CM

## 2024-10-28 DIAGNOSIS — Z79.899 ENCOUNTER FOR LONG-TERM (CURRENT) USE OF HIGH-RISK MEDICATION: ICD-10-CM

## 2024-10-28 DIAGNOSIS — I10 PRIMARY HYPERTENSION: ICD-10-CM

## 2024-10-28 PROCEDURE — 1123F ACP DISCUSS/DSCN MKR DOCD: CPT | Performed by: INTERNAL MEDICINE

## 2024-10-28 PROCEDURE — 99215 OFFICE O/P EST HI 40 MIN: CPT | Performed by: INTERNAL MEDICINE

## 2024-10-28 PROCEDURE — G8400 PT W/DXA NO RESULTS DOC: HCPCS | Performed by: INTERNAL MEDICINE

## 2024-10-28 PROCEDURE — 1126F AMNT PAIN NOTED NONE PRSNT: CPT | Performed by: INTERNAL MEDICINE

## 2024-10-28 PROCEDURE — 3077F SYST BP >= 140 MM HG: CPT | Performed by: INTERNAL MEDICINE

## 2024-10-28 PROCEDURE — G8427 DOCREV CUR MEDS BY ELIG CLIN: HCPCS | Performed by: INTERNAL MEDICINE

## 2024-10-28 PROCEDURE — 1090F PRES/ABSN URINE INCON ASSESS: CPT | Performed by: INTERNAL MEDICINE

## 2024-10-28 PROCEDURE — 1036F TOBACCO NON-USER: CPT | Performed by: INTERNAL MEDICINE

## 2024-10-28 PROCEDURE — G2211 COMPLEX E/M VISIT ADD ON: HCPCS | Performed by: INTERNAL MEDICINE

## 2024-10-28 PROCEDURE — 3078F DIAST BP <80 MM HG: CPT | Performed by: INTERNAL MEDICINE

## 2024-10-28 PROCEDURE — G8484 FLU IMMUNIZE NO ADMIN: HCPCS | Performed by: INTERNAL MEDICINE

## 2024-10-28 PROCEDURE — 1159F MED LIST DOCD IN RCRD: CPT | Performed by: INTERNAL MEDICINE

## 2024-10-28 PROCEDURE — G8419 CALC BMI OUT NRM PARAM NOF/U: HCPCS | Performed by: INTERNAL MEDICINE

## 2024-10-28 ASSESSMENT — PATIENT HEALTH QUESTIONNAIRE - PHQ9
SUM OF ALL RESPONSES TO PHQ QUESTIONS 1-9: 0
1. LITTLE INTEREST OR PLEASURE IN DOING THINGS: NOT AT ALL
SUM OF ALL RESPONSES TO PHQ9 QUESTIONS 1 & 2: 0
2. FEELING DOWN, DEPRESSED OR HOPELESS: NOT AT ALL

## 2024-10-28 NOTE — PROGRESS NOTES
Chief Complaint   Patient presents with    Follow-up           Vitals:    10/28/24 1103   BP: (!) 164/65   Pulse: 74   Temp: 97.6 °F (36.4 °C)   SpO2: 96%            1. Have you been to the ER, urgent care clinic since your last visit?  Hospitalized since your last visit?  No  2. Have you seen or consulted any other health care providers outside of the [facility] since your last visit?  Include any pap smears or colon screening. No

## 2024-10-28 NOTE — PROGRESS NOTES
[facility] [place]  Medical Oncology at [place]  [phone]    Hematology / Oncology Established Visit    Reason for Visit:   Shira Yost is a 84 y.o. female who is seen for follow up of CML. PCP is Dr. Rosmery Barnes.     Hematology Oncology Treatment History:     Diagnosis: Chronic myelogenous leukemia (CML)    Stage: N/A    Pathology:   - 8/1/20 Peripheral blood BCR-ABL1 FISH: 84% of nuclei positive for BCR/ABL1 fusion    -8/28/20 Bone marrow biopsy: Chronic myeloid leukemia, BCR-ABL1-positive, chronic phase   The bone marrow biopsy is 90% cellular and adequate for evaluation. The myeloid series appears hyperplastic and left-shifted with increased proportion of promyelocytes and myelocytes. Blasts do not appear increased. The erythroid series is reduced in number but progresses through the full maturation sequence without significant dysplasia. Megakaryocytes are increased in number with dwarf forms seen. Plasma cells and lymphocytes do not appear increased in number.   -Cytogenetics: Karyotype: 46,XX,t(9;22)(q34.1;q11.2)[20]; Interpretation: ABNORMAL FEMALE KARYOTYPE   The t(9;22)(q34.1;q11.2), resulting in formation of the [place] chromosome, is observed in 90-95% of cases with chronic myeloid leukemia, BCR-ABL1 positive. It is also observed in acute lymphoblastic leukemia/lymphoma and mixed phenotype acute leukemia (MPAL) with t(9;22)(q34.1;q11.2);BCR-ABL1 and in 1-2% of AML. These results should be nterpreted in the context of clinical and histopathologic findings..    Prior Treatment: Dasatinib 100mg PO daily, started 9/29/2020 - mid Oct 2020 (discontinued for mood changes, fluid retention).    Current Treatment: Gleevec 200mg PO daily     History of Present Illness:   Shira Yost is a 84 y.o. female with HTN, Hyperlipidemia, allergic rhinitis, GERD comes in for follow up of CML. Per PCP notes, pt has had fatigue for past 4 months. Recently was treated with UTI in late June 2020

## 2024-11-13 DIAGNOSIS — C92.10 CHRONIC MYELOID LEUKEMIA, BCR/ABL-POSITIVE, NOT HAVING ACHIEVED REMISSION (HCC): ICD-10-CM

## 2024-11-13 NOTE — TELEPHONE ENCOUNTER
11/13/24 3:07 PM Refill of Gleevec pended to [facility] for review. Patient requested refill via [employer].

## 2024-11-14 RX ORDER — IMATINIB MESYLATE 100 MG/1
200 TABLET, FILM COATED ORAL DAILY
Qty: 60 TABLET | Refills: 2 | Status: ACTIVE | OUTPATIENT
Start: 2024-11-14

## 2025-01-02 DIAGNOSIS — C92.10 CHRONIC MYELOID LEUKEMIA, BCR/ABL-POSITIVE, NOT HAVING ACHIEVED REMISSION (HCC): ICD-10-CM

## 2025-01-02 DIAGNOSIS — Z79.899 ENCOUNTER FOR LONG-TERM (CURRENT) USE OF HIGH-RISK MEDICATION: ICD-10-CM

## 2025-01-02 LAB
ALBUMIN SERPL-MCNC: 4.1 G/DL (ref 3.5–5)
ALBUMIN/GLOB SERPL: 1.5 (ref 1.1–2.2)
ALP SERPL-CCNC: 52 U/L (ref 45–117)
ALT SERPL-CCNC: 26 U/L (ref 12–78)
ANION GAP SERPL CALC-SCNC: 4 MMOL/L (ref 2–12)
AST SERPL-CCNC: 17 U/L (ref 15–37)
BASOPHILS # BLD: 0.1 K/UL (ref 0–0.1)
BASOPHILS NFR BLD: 1 % (ref 0–1)
BILIRUB SERPL-MCNC: 0.4 MG/DL (ref 0.2–1)
BUN SERPL-MCNC: 18 MG/DL (ref 6–20)
BUN/CREAT SERPL: 21 (ref 12–20)
CALCIUM SERPL-MCNC: 9.5 MG/DL (ref 8.5–10.1)
CHLORIDE SERPL-SCNC: 103 MMOL/L (ref 97–108)
CO2 SERPL-SCNC: 29 MMOL/L (ref 21–32)
CREAT SERPL-MCNC: 0.86 MG/DL (ref 0.55–1.02)
DIFFERENTIAL METHOD BLD: ABNORMAL
EOSINOPHIL # BLD: 0.2 K/UL (ref 0–0.4)
EOSINOPHIL NFR BLD: 2 % (ref 0–7)
ERYTHROCYTE [DISTWIDTH] IN BLOOD BY AUTOMATED COUNT: 13.9 % (ref 11.5–14.5)
FERRITIN SERPL-MCNC: 154 NG/ML (ref 8–252)
FOLATE SERPL-MCNC: 40.2 NG/ML (ref 5–21)
GLOBULIN SER CALC-MCNC: 2.7 G/DL (ref 2–4)
GLUCOSE SERPL-MCNC: 110 MG/DL (ref 65–100)
HAPTOGLOB SERPL-MCNC: 73 MG/DL (ref 30–200)
HCT VFR BLD AUTO: 31.6 % (ref 35–47)
HGB BLD-MCNC: 10.3 G/DL (ref 11.5–16)
IMM GRANULOCYTES # BLD AUTO: 0 K/UL (ref 0–0.04)
IMM GRANULOCYTES NFR BLD AUTO: 0 % (ref 0–0.5)
IRON SATN MFR SERPL: 17 % (ref 20–50)
IRON SERPL-MCNC: 58 UG/DL (ref 35–150)
LDH SERPL L TO P-CCNC: 276 U/L (ref 81–246)
LYMPHOCYTES # BLD: 1.4 K/UL (ref 0.8–3.5)
LYMPHOCYTES NFR BLD: 19 % (ref 12–49)
MCH RBC QN AUTO: 31.7 PG (ref 26–34)
MCHC RBC AUTO-ENTMCNC: 32.6 G/DL (ref 30–36.5)
MCV RBC AUTO: 97.2 FL (ref 80–99)
MONOCYTES # BLD: 1.2 K/UL (ref 0–1)
MONOCYTES NFR BLD: 17 % (ref 5–13)
NEUTS SEG # BLD: 4.4 K/UL (ref 1.8–8)
NEUTS SEG NFR BLD: 61 % (ref 32–75)
NRBC # BLD: 0 K/UL (ref 0–0.01)
NRBC BLD-RTO: 0 PER 100 WBC
PLATELET # BLD AUTO: 261 K/UL (ref 150–400)
PMV BLD AUTO: 11.1 FL (ref 8.9–12.9)
POTASSIUM SERPL-SCNC: 4.3 MMOL/L (ref 3.5–5.1)
PROT SERPL-MCNC: 6.8 G/DL (ref 6.4–8.2)
RBC # BLD AUTO: 3.25 M/UL (ref 3.8–5.2)
RETICS # AUTO: 0.06 M/UL (ref 0.02–0.08)
RETICS/RBC NFR AUTO: 1.8 % (ref 0.7–2.1)
SODIUM SERPL-SCNC: 136 MMOL/L (ref 136–145)
TIBC SERPL-MCNC: 336 UG/DL (ref 250–450)
VIT B12 SERPL-MCNC: >2000 PG/ML (ref 193–986)
WBC # BLD AUTO: 7.2 K/UL (ref 3.6–11)

## 2025-01-07 LAB
INTERPRETATION: POSITIVE
LAB DIRECTOR NAME PROVIDER: ABNORMAL
LABORATORY COMMENT REPORT: ABNORMAL
T(ABL1,BCR)B2A2/CONTROL BLD/T: 0.01 %
T(ABL1,BCR)B2A2/CONTROL BLD/T: ABNORMAL
T(ABL1,BCR)B3A2/CONTROL BLD/T: 0.13 %
T(ABL1,BCR)E1A2/CONTROL BLD/T: ABNORMAL %

## 2025-01-27 ENCOUNTER — TELEPHONE (OUTPATIENT)
Age: 85
End: 2025-01-27

## 2025-01-27 DIAGNOSIS — C92.10 CHRONIC MYELOID LEUKEMIA, BCR/ABL-POSITIVE, NOT HAVING ACHIEVED REMISSION (HCC): ICD-10-CM

## 2025-01-27 NOTE — TELEPHONE ENCOUNTER
[facility] [place] [facility]  [phone]    01/27/25 2:01 PM EST - Medication refill pended to NP

## 2025-01-27 NOTE — TELEPHONE ENCOUNTER
Pt spouse called in requesting a prescription refill on Gleevec 100mg. He said would like the request sent to [facility].       # [phone]

## 2025-01-28 RX ORDER — IMATINIB MESYLATE 100 MG/1
200 TABLET, FILM COATED ORAL DAILY
Qty: 60 TABLET | Refills: 2 | Status: ACTIVE | OUTPATIENT
Start: 2025-01-28

## 2025-01-30 ENCOUNTER — OFFICE VISIT (OUTPATIENT)
Age: 85
End: 2025-01-30
Payer: MEDICARE

## 2025-01-30 VITALS
TEMPERATURE: 97.6 F | DIASTOLIC BLOOD PRESSURE: 86 MMHG | HEIGHT: 61 IN | SYSTOLIC BLOOD PRESSURE: 166 MMHG | HEART RATE: 78 BPM | OXYGEN SATURATION: 99 % | BODY MASS INDEX: 28.66 KG/M2 | RESPIRATION RATE: 16 BRPM | WEIGHT: 151.8 LBS

## 2025-01-30 DIAGNOSIS — C92.10 CHRONIC MYELOID LEUKEMIA, BCR/ABL-POSITIVE, NOT HAVING ACHIEVED REMISSION (HCC): Primary | ICD-10-CM

## 2025-01-30 DIAGNOSIS — Z79.899 ENCOUNTER FOR LONG-TERM (CURRENT) USE OF HIGH-RISK MEDICATION: ICD-10-CM

## 2025-01-30 DIAGNOSIS — D64.9 NORMOCYTIC ANEMIA: ICD-10-CM

## 2025-01-30 DIAGNOSIS — K52.9 CHRONIC DIARRHEA: ICD-10-CM

## 2025-01-30 DIAGNOSIS — R60.0 BILATERAL LOWER EXTREMITY EDEMA: ICD-10-CM

## 2025-01-30 PROCEDURE — 1123F ACP DISCUSS/DSCN MKR DOCD: CPT | Performed by: NURSE PRACTITIONER

## 2025-01-30 PROCEDURE — 1159F MED LIST DOCD IN RCRD: CPT | Performed by: NURSE PRACTITIONER

## 2025-01-30 PROCEDURE — G8400 PT W/DXA NO RESULTS DOC: HCPCS | Performed by: NURSE PRACTITIONER

## 2025-01-30 PROCEDURE — 1036F TOBACCO NON-USER: CPT | Performed by: NURSE PRACTITIONER

## 2025-01-30 PROCEDURE — 99215 OFFICE O/P EST HI 40 MIN: CPT | Performed by: NURSE PRACTITIONER

## 2025-01-30 PROCEDURE — 1126F AMNT PAIN NOTED NONE PRSNT: CPT | Performed by: NURSE PRACTITIONER

## 2025-01-30 PROCEDURE — G8427 DOCREV CUR MEDS BY ELIG CLIN: HCPCS | Performed by: NURSE PRACTITIONER

## 2025-01-30 PROCEDURE — G2211 COMPLEX E/M VISIT ADD ON: HCPCS | Performed by: NURSE PRACTITIONER

## 2025-01-30 PROCEDURE — 1090F PRES/ABSN URINE INCON ASSESS: CPT | Performed by: NURSE PRACTITIONER

## 2025-01-30 PROCEDURE — G8419 CALC BMI OUT NRM PARAM NOF/U: HCPCS | Performed by: NURSE PRACTITIONER

## 2025-01-30 PROCEDURE — 1160F RVW MEDS BY RX/DR IN RCRD: CPT | Performed by: NURSE PRACTITIONER

## 2025-01-30 ASSESSMENT — PATIENT HEALTH QUESTIONNAIRE - PHQ9
SUM OF ALL RESPONSES TO PHQ QUESTIONS 1-9: 0
SUM OF ALL RESPONSES TO PHQ9 QUESTIONS 1 & 2: 0
2. FEELING DOWN, DEPRESSED OR HOPELESS: NOT AT ALL
SUM OF ALL RESPONSES TO PHQ QUESTIONS 1-9: 0
1. LITTLE INTEREST OR PLEASURE IN DOING THINGS: NOT AT ALL
SUM OF ALL RESPONSES TO PHQ QUESTIONS 1-9: 0
SUM OF ALL RESPONSES TO PHQ QUESTIONS 1-9: 0

## 2025-01-30 NOTE — PROGRESS NOTES
[facility] [place]  Medical Oncology at [place]  [phone]    Hematology / Oncology Established Visit    Reason for Visit:   Shira Yost is a 84 y.o. female who is seen for follow up of CML. PCP is Dr. Rosmery Barnes.     Hematology Oncology Treatment History:     Diagnosis: Chronic myelogenous leukemia (CML)    Stage: N/A    Pathology:   - 8/1/20 Peripheral blood BCR-ABL1 FISH: 84% of nuclei positive for BCR/ABL1 fusion    -8/28/20 Bone marrow biopsy: Chronic myeloid leukemia, BCR-ABL1-positive, chronic phase   The bone marrow biopsy is 90% cellular and adequate for evaluation. The myeloid series appears hyperplastic and left-shifted with increased proportion of promyelocytes and myelocytes. Blasts do not appear increased. The erythroid series is reduced in number but progresses through the full maturation sequence without significant dysplasia. Megakaryocytes are increased in number with dwarf forms seen. Plasma cells and lymphocytes do not appear increased in number.   -Cytogenetics: Karyotype: 46,XX,t(9;22)(q34.1;q11.2)[20]; Interpretation: ABNORMAL FEMALE KARYOTYPE   The t(9;22)(q34.1;q11.2), resulting in formation of the [place] chromosome, is observed in 90-95% of cases with chronic myeloid leukemia, BCR-ABL1 positive. It is also observed in acute lymphoblastic leukemia/lymphoma and mixed phenotype acute leukemia (MPAL) with t(9;22)(q34.1;q11.2);BCR-ABL1 and in 1-2% of AML. These results should be nterpreted in the context of clinical and histopathologic findings..    Prior Treatment: Dasatinib 100mg PO daily, started 9/29/2020 - mid Oct 2020 (discontinued for mood changes, fluid retention).    Current Treatment: Gleevec 200mg PO daily     History of Present Illness:   Shira Yost is a 84 y.o. female with HTN, Hyperlipidemia, allergic rhinitis, GERD comes in for follow up of CML. Per PCP notes, pt has had fatigue for past 4 months. Recently was treated with UTI in late June 2020

## 2025-01-30 NOTE — PROGRESS NOTES
Shira Yost is a 84 y.o. female follow up for         1. Have you been to the ER, urgent care clinic since your last visit?  Hospitalized since your last visit?No    2. Have you seen or consulted any other health care providers outside of the [facility] since your last visit?  Include any pap smears or colon screening. Wound clinic skin peeling off R leg

## 2025-02-21 NOTE — PROGRESS NOTES
Hospitalist Progress Note      NAME: Alley Patino   :  1940  MRM:  [id number]    Date/Time: 2023  6:19 PM             Subjective:     Chief Complaint: Bacteremia    No fever. No abdominal pain or dysuria. Patient is eager to go home. Explained nature of bloodstream infection and reason for hospitalization for IV antibiotics and repeat cultures. Objective:       Vitals:          Last 24hrs VS reviewed since prior progress note. Most recent are:    Visit Vitals  BP (!) 161/67 (BP 1 Location: Left upper arm, BP Patient Position: At rest)   Pulse 71   Temp 98.2 °F (36.8 °C)   Resp 18   Ht 5' 2\" (1.575 m)   Wt 64.5 kg (142 lb 3.2 oz)   SpO2 95%   BMI 26.01 kg/m²     SpO2 Readings from Last 6 Encounters:   23 95%   23 95%   23 98%   22 97%   22 98%   22 97%          Intake/Output Summary (Last 24 hours) at 2023 1819  Last data filed at 2023 0421  Gross per 24 hour   Intake 100 ml   Output --   Net 100 ml          Exam:     Physical Exam:    General:   No acute distress, resting comfortably in bed. Heart:  RRR, No MRG  Lungs:  CTAB. Non-labored breathing. Abdomen:  Soft . Nondistended. NTTP. BS present. Extremities:  No edema. Neuro:  Alert and interactive. No focal deficits. Psych:  Mood stable.         Medications Reviewed: (see below)    Lab Data Reviewed: (see below)    ______________________________________________________________________    Medications:     Current Facility-Administered Medications   Medication Dose Route Frequency    lisinopril/hydroCHLOROthiazide (PRINZIDE/ZESTORETIC) 20/12.5 mg   Oral BID    amLODIPine (NORVASC) tablet 5 mg  5 mg Oral DAILY    hydrALAZINE (APRESOLINE) 20 mg/mL injection 10 mg  10 mg IntraVENous Q6H PRN    imatinib (GLEEVEC) chemo tablet 200 mg (Patient Supplied)  200 mg Oral DAILY    loperamide (IMODIUM) capsule 2 mg  2 mg Oral PRN    sodium chloride (NS) flush 5-40 mL  5-40 mL IntraVENous Q8H Patient called inquiring about contact lenses. He said it has been several years since he has worn them. He said he is asking for distance only contacts.   sodium chloride (NS) flush 5-40 mL  5-40 mL IntraVENous PRN    cefTRIAXone (ROCEPHIN) 2 g in 0.9% sodium chloride 20 mL IV syringe  2 g IntraVENous Q24H    sodium chloride (NS) flush 5-40 mL  5-40 mL IntraVENous Q8H    sodium chloride (NS) flush 5-40 mL  5-40 mL IntraVENous PRN    acetaminophen (TYLENOL) tablet 650 mg  650 mg Oral Q6H PRN    Or    acetaminophen (TYLENOL) suppository 650 mg  650 mg Rectal Q6H PRN    polyethylene glycol (MIRALAX) packet 17 g  17 g Oral DAILY PRN    promethazine (PHENERGAN) tablet 12.5 mg  12.5 mg Oral Q6H PRN    Or    ondansetron (ZOFRAN) injection 4 mg  4 mg IntraVENous Q6H PRN    enoxaparin (LOVENOX) injection 40 mg  40 mg SubCUTAneous DAILY    cyanocobalamin (VITAMIN B12) tablet 5,000 mcg  5,000 mcg Oral DAILY    traMADoL (ULTRAM) tablet 50 mg  50 mg Oral Q6H PRN            Lab Review:     Recent Labs     04/17/23  0016 04/16/23  0234 04/15/23  0025   WBC 11.2* 9.8 11.8*   HGB 10.0* 10.1* 9.6*   HCT 29.5* 29.2* 27.8*   * 430* 369     Recent Labs     04/17/23  0016 04/16/23  0234 04/15/23  0025    142 135*   K 3.4* 3.5 3.4*    109* 108   CO2 27 27 23   * 145* 138*   BUN 15 17 22*   CREA 0.72 0.77 0.92   CA 8.4* 8.4* 7.9*   MG 1.7 1.8 1.9   ALB  --  2.5* 2.3*   ALT  --  47 52     No components found for: [place]         Assessment / Plan:      Active Problems:    Bacteremia (4/13/2023)      Pyelonephritis (4/13/2023)        Bipin Ren is a [de-identified] y.o.  female with history of CML, hypertension, and high cholesterol and recent urinary tract infection presenting with E. coli bacteremia    E. coli bacteremia  UTI  Left-sided pyelonephritis  -Continue Rocephin  -CT suggestive of left-sided pyelonephritis but urinalysis negative  -Immunocompromise  -Repeat blood cultures pending  -We will discharge on 10-day p.o. quinolone if repeat cultures negative    CML  -Continue home [address]  - oncology consulted    Hypertension  -Continue Zestoretic twice daily    Hyperlipidemia  -Continue statin    DVT ppx:  lovenox    Dispo:  home 1-2 days    FULL CODE       Total time spent with patient care: 30 min. I personally saw and examined the patient during this time period.                                                                                                                  Care Plan discussed with: patient, CM    Discussed:  Care Plan                ___________________________________________________    Attending Physician: Mikala Black MD

## 2025-02-25 ENCOUNTER — TELEPHONE (OUTPATIENT)
Facility: HOSPITAL | Age: 85
End: 2025-02-25

## 2025-02-25 NOTE — TELEPHONE ENCOUNTER
Twp patient identifiers verified, called to confirm new pt appt at [facility] tomorrow at 1pm.

## 2025-02-26 ENCOUNTER — HOSPITAL ENCOUNTER (OUTPATIENT)
Facility: HOSPITAL | Age: 85
Discharge: HOME | End: 2025-02-26
Attending: EMERGENCY MEDICINE
Payer: MEDICARE

## 2025-02-26 VITALS
RESPIRATION RATE: 18 BRPM | HEART RATE: 80 BPM | DIASTOLIC BLOOD PRESSURE: 75 MMHG | TEMPERATURE: 97.9 F | SYSTOLIC BLOOD PRESSURE: 163 MMHG

## 2025-02-26 DIAGNOSIS — L97.921 ULCERS OF BOTH LOWER EXTREMITIES, LIMITED TO BREAKDOWN OF SKIN (HCC): Primary | ICD-10-CM

## 2025-02-26 DIAGNOSIS — L97.911 ULCERS OF BOTH LOWER EXTREMITIES, LIMITED TO BREAKDOWN OF SKIN (HCC): Primary | ICD-10-CM

## 2025-02-26 DIAGNOSIS — C91.10 CLL (CHRONIC LYMPHOCYTIC LEUKEMIA) (HCC): ICD-10-CM

## 2025-02-26 PROCEDURE — 97597 DBRDMT OPN WND 1ST 20 CM/<: CPT

## 2025-02-26 PROCEDURE — 99213 OFFICE O/P EST LOW 20 MIN: CPT

## 2025-02-26 RX ORDER — LIDOCAINE HYDROCHLORIDE 20 MG/ML
JELLY TOPICAL PRN
OUTPATIENT
Start: 2025-02-26

## 2025-02-26 RX ORDER — LIDOCAINE HYDROCHLORIDE 40 MG/ML
SOLUTION TOPICAL PRN
OUTPATIENT
Start: 2025-02-26

## 2025-02-26 RX ORDER — BETAMETHASONE DIPROPIONATE 0.5 MG/G
CREAM TOPICAL PRN
OUTPATIENT
Start: 2025-02-26

## 2025-02-26 RX ORDER — GINSENG 100 MG
CAPSULE ORAL PRN
OUTPATIENT
Start: 2025-02-26

## 2025-02-26 RX ORDER — LIDOCAINE 40 MG/G
CREAM TOPICAL PRN
OUTPATIENT
Start: 2025-02-26

## 2025-02-26 RX ORDER — CLOBETASOL PROPIONATE 0.5 MG/G
OINTMENT TOPICAL PRN
OUTPATIENT
Start: 2025-02-26

## 2025-02-26 RX ORDER — NEOMYCIN/BACITRACIN/POLYMYXINB 3.5-400-5K
OINTMENT (GRAM) TOPICAL PRN
OUTPATIENT
Start: 2025-02-26

## 2025-02-26 RX ORDER — GENTAMICIN SULFATE 1 MG/G
OINTMENT TOPICAL PRN
OUTPATIENT
Start: 2025-02-26

## 2025-02-26 RX ORDER — MUPIROCIN 20 MG/G
OINTMENT TOPICAL PRN
OUTPATIENT
Start: 2025-02-26

## 2025-02-26 RX ORDER — SILVER SULFADIAZINE 10 MG/G
CREAM TOPICAL PRN
OUTPATIENT
Start: 2025-02-26

## 2025-02-26 RX ORDER — LIDOCAINE 50 MG/G
OINTMENT TOPICAL PRN
OUTPATIENT
Start: 2025-02-26

## 2025-02-26 RX ORDER — TRIAMCINOLONE ACETONIDE 1 MG/G
OINTMENT TOPICAL PRN
OUTPATIENT
Start: 2025-02-26

## 2025-02-26 RX ORDER — SODIUM CHLOR/HYPOCHLOROUS ACID 0.033 %
SOLUTION, IRRIGATION IRRIGATION PRN
OUTPATIENT
Start: 2025-02-26

## 2025-02-26 RX ORDER — BACITRACIN ZINC AND POLYMYXIN B SULFATE 500; 1000 [USP'U]/G; [USP'U]/G
OINTMENT TOPICAL PRN
OUTPATIENT
Start: 2025-02-26

## 2025-02-26 ASSESSMENT — PAIN DESCRIPTION - ORIENTATION: ORIENTATION: LEFT;RIGHT

## 2025-02-26 ASSESSMENT — PAIN DESCRIPTION - LOCATION: LOCATION: LEG

## 2025-02-26 ASSESSMENT — PAIN SCALES - GENERAL: PAINLEVEL_OUTOF10: 2

## 2025-02-26 NOTE — FLOWSHEET NOTE
02/26/25 1325   Anesthetic   Anesthetic 4% Lidocaine Liquid Topical   Right Leg Edema Point of Measurement   Leg circumference 37 cm   Ankle circumference 21 cm   Left Leg Edema Point of Measurement   Leg circumference 37 cm   Ankle circumference 21 cm   RLE Neurovascular Assessment   Capillary Refill Less than/Equal to 3 seconds   Color Appropriate for Ethnicity   Temperature Warm   R Pedal Pulse +1   LLE Neurovascular Assessment   Capillary Refill Less than/Equal to 3 seconds   Color Appropriate for Ethnicity   Temperature Warm   L Pedal Pulse +1   Wound 02/26/25 Leg Left;Anterior #1   Date First Assessed/Time First Assessed: 02/26/25 1322   Present on Original Admission: Yes  Location: Leg  Wound Location Orientation: Left;Anterior  Wound Description (Comments): #1   Wound Image    Wound Cleansed Soap and water   Wound Length (cm) 3 cm   Wound Width (cm) 3 cm   Wound Depth (cm) 0 cm   Wound Surface Area (cm^2) 9 cm^2   Wound Volume (cm^3) 0 cm^3   Wound Assessment Pink/red   Drainage Amount Scant (moist but unmeasurable)   Drainage Description Serous   Odor None   Adry-wound Assessment Blanchable erythema;Fragile;Dry/flaky   Margins Flat/open edges   Wound 02/26/25 Leg Right;Anterior #2   Date First Assessed/Time First Assessed: 02/26/25 1327   Present on Original Admission: Yes  Location: Leg  Wound Location Orientation: Right;Anterior  Wound Description (Comments): #2   Wound Image    Wound Cleansed Soap and water   Wound Length (cm) 0.1 cm   Wound Width (cm) 0.1 cm   Wound Depth (cm) 0 cm   Wound Surface Area (cm^2) 0.01 cm^2   Wound Volume (cm^3) 0 cm^3   Wound Assessment Epithelialization;Purple/maroon   Drainage Amount None (dry)   Odor None   Adry-wound Assessment Fragile;Dry/flaky   Margins Flat/open edges     BP (!) 163/75   Pulse 80   Temp 97.9 °F (36.6 °C) (Temporal)   Resp 18

## 2025-02-26 NOTE — PROGRESS NOTES
02/26/25 1325   Wound Surface Area (cm^2) 9 cm^2 02/26/25 1325   Wound Volume (cm^3) 0 cm^3 02/26/25 1325   Post-Procedure Length (cm) 3 cm 02/26/25 1347   Post-Procedure Width (cm) 3 cm 02/26/25 1347   Post-Procedure Depth (cm) 0.1 cm 02/26/25 1347   Post-Procedure Surface Area (cm^2) 9 cm^2 02/26/25 1347   Post-Procedure Volume (cm^3) 0.9 cm^3 02/26/25 1347   Wound Assessment Pink/red 02/26/25 1325   Drainage Amount Scant (moist but unmeasurable) 02/26/25 1325   Drainage Description Serous 02/26/25 1325   Odor None 02/26/25 1325   Adry-wound Assessment Blanchable erythema;Fragile;Dry/flaky 02/26/25 1325   Margins Flat/open edges 02/26/25 1325   Number of days: 0       Wound 02/26/25 Leg Right;Anterior #2 (Active)   Wound Image   02/26/25 1325   Wound Cleansed Soap and water 02/26/25 1325   Dressing/Treatment Gauze dressing/dressing sponge;Petroleum impregnated gauze;Roll gauze;Tape/Soft cloth adhesive tape 02/26/25 1422   Wound Length (cm) 0.1 cm 02/26/25 1325   Wound Width (cm) 0.1 cm 02/26/25 1325   Wound Depth (cm) 0 cm 02/26/25 1325   Wound Surface Area (cm^2) 0.01 cm^2 02/26/25 1325   Wound Volume (cm^3) 0 cm^3 02/26/25 1325   Wound Assessment Epithelialization;Purple/maroon 02/26/25 1325   Drainage Amount None (dry) 02/26/25 1325   Odor None 02/26/25 1325   Adry-wound Assessment Fragile;Dry/flaky 02/26/25 1325   Margins Flat/open edges 02/26/25 1325   Number of days: 0              Chief Complaint (CC): persistent wounds BLE  Present Illness (PI): patient with known CLL on meds has recently had open wound pretib BLEs treated within [facility]. Had been recommended to have vascular eval but has not had that done. .  Pertinent Past History (PMedHx): known lumbar radiculopathy.  Also noted:                         Medications and Allergies: as per [facility] Med Rec. I have reviewed and concur.                        Illnesses: as per '[facility]' recorded data noted today.                         Surgeries and

## 2025-02-26 NOTE — FLOWSHEET NOTE
02/26/25 1422   Right Leg Edema Point of Measurement   Compression Therapy Tubular elastic support bandage   Tubular Elastic Support Bandage Compression Pressure Medium   Left Leg Edema Point of Measurement   Compression Therapy Tubular elastic support bandage   Tubular Elastic Support Bandage Compression Pressure Medium   Wound 02/26/25 Leg Left;Anterior #1   Date First Assessed/Time First Assessed: 02/26/25 1322   Present on Original Admission: Yes  Location: Leg  Wound Location Orientation: Left;Anterior  Wound Description (Comments): #1   Dressing/Treatment Petroleum impregnated gauze;Gauze dressing/dressing sponge;Roll gauze;Tape/Soft cloth adhesive tape   Wound 02/26/25 Leg Right;Anterior #2   Date First Assessed/Time First Assessed: 02/26/25 1327   Present on Original Admission: Yes  Location: Leg  Wound Location Orientation: Right;Anterior  Wound Description (Comments): #2   Dressing/Treatment Gauze dressing/dressing sponge;Petroleum impregnated gauze;Roll gauze;Tape/Soft cloth adhesive tape     Discharge Condition: Stable    Pain: 2    Ambulatory Status: Straight Cane    Discharge Destination: home    Transportation:car    Accompanied by: FAMILY    Discharge instructions reviewed with FAMILY and patient and copy or written instructions have been provided. All questions/concerns have been addressed at this time.

## 2025-02-26 NOTE — PATIENT INSTRUCTIONS
Discharge Instructions for  [facility]  [address] [place] [address]  Telephone: [phone]   FAX [phone]    NAME:  Shira Yost  YOB: 1940  ACCOUNT NUMBER :  [id number]  DATE:  2/26/2025       :VERA MARI RN       WOUND SUPPLIES/ DME company : [employer]         Wound Cleansing:   Do not scrub or use excessive force.  Cleanse wound prior to applying a clean dressing with: Try to avoid white soaps to shower with          [x] Shower on days of dressing change, remove dressing first [x] Cleanse wounds with: Eulogio and Eulogio baby shampoo lather leave 2-3 min then rinse with water          Topical Treatments:  Do not apply lotions, creams, or ointments to wound bed unless directed.     [x] Apply A&D ointment  to dry, flaky skin surrounding the wound prior to dressing change.       Dressings:           Wound Location : Right and Left Lower leg wounds   Apply Primary Dressing:       [] MediHoney Gel    [] MediHoney Alginate  [] Alginate     [] Alginate with Silver       [] Collagen   [] Collagen with Silver     [] Hydrocolloid  [] Hydrofera Blue Classic (moisten with saline)  [] Hydrofera Blue Ready  [x] Other:  Adaptic   [] Pack wound loosely with      Cover and Secure with:    [x] Gauze   [x] Jerald   [] Kerlix  [] Ace Wrap     [] ABD  [] Medipore tape  [x] tape  [] Other:    Avoid contact of tape with skin.    Change dressing:   [] Daily      [x] Every Other Day   [] Three times per week  [] Once a week   [] Do Not Change Dressing     [] Other:    Compression:  Apply:  [x] Compression Applied in Clinic: Other: []Right Leg []Left Leg  [x] Bilateral legs double tubi      Activity:  Activity as tolerated:    [x] Patient has no activity restrictions       [] Strict Bedrest:   [] Remain off Work:     [] Return to work with restrictions:    [] May return to full duty work:                                              Return

## 2025-02-27 NOTE — PLAN OF CARE
Wound Care Supplies      Supply Company:     [employer]       Ordering Center:     [facility]  [address] [place] [address] [address]  [facility] [address] [phone]  WOUND CARE Dept: [phone]   FAX NUMBER [phone]    Patient Information:      Shira Yost  [id number] [facility] [id number] [phone]   : 1940  AGE: 84 y.o.     GENDER: female   EPISODE DATE: 2025    Insurance:      PRIMARY INSURANCE:  Plan: HU[employer]NA GOLD PLUS HMO  Coverage: HUMANA MEDICARE  Effective Date: 10/1/2024  Group Number: [unfilled]  Subscriber Number: [id number] - (Medicare Managed)    Payer/Plan Subscr  Sex Relation Sub. Ins. ID Effective Group Num   1. 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) - Primary    CLL (chronic lymphocytic leukemia) (HCC)       WOUNDS REQUIRING DRESSING SUPPLIES:     Wound 25 Leg Left;Anterior #1 (Active)   Wound Image   25 1325   Wound Cleansed Soap and water 25 1325   Dressing/Treatment Petroleum impregnated gauze;Gauze dressing/dressing sponge;Roll gauze;Tape/Soft cloth adhesive tape 25 1422   Wound Length (cm) 3 cm 25 1325   Wound Width (cm) 3 cm 25 1325   Wound Depth (cm) 0 cm 25 1325   Wound Surface Area (cm^2) 9 cm^2 25 1325   Wound Volume (cm^3) 0 cm^3 25 1325   Post-Procedure Length (cm) 3 cm 25 1347   Post-Procedure Width (cm) 3 cm 25 1347   Post-Procedure Depth (cm) 0.1 cm 25 1347   Post-Procedure Surface Area (cm^2) 9 cm^2 25 1347   Post-Procedure Volume (cm^3) 0.9 cm^3 25 1347   Wound Assessment Pink/red 25 1325   Drainage Amount Scant (moist but unmeasurable) 25 1325   Drainage Description Serous 25

## 2025-03-03 ENCOUNTER — TELEPHONE (OUTPATIENT)
Facility: HOSPITAL | Age: 85
End: 2025-03-03

## 2025-03-03 NOTE — TELEPHONE ENCOUNTER
Supplies that were ordered came in mail to patient, but there was no roll gauze. New order placed to [facility] for roll gauze to be sent out to patient.  had further questions at this time.

## 2025-03-12 ENCOUNTER — HOSPITAL ENCOUNTER (OUTPATIENT)
Facility: HOSPITAL | Age: 85
Discharge: HOME | End: 2025-03-12
Attending: EMERGENCY MEDICINE
Payer: MEDICARE

## 2025-03-12 VITALS
SYSTOLIC BLOOD PRESSURE: 159 MMHG | HEART RATE: 73 BPM | TEMPERATURE: 97.4 F | RESPIRATION RATE: 18 BRPM | DIASTOLIC BLOOD PRESSURE: 73 MMHG

## 2025-03-12 DIAGNOSIS — S81.812S NONINFECTED SKIN TEAR OF LEFT LEG, SEQUELA: ICD-10-CM

## 2025-03-12 DIAGNOSIS — L97.911 ULCERS OF BOTH LOWER EXTREMITIES, LIMITED TO BREAKDOWN OF SKIN (HCC): Primary | ICD-10-CM

## 2025-03-12 DIAGNOSIS — L97.921 ULCERS OF BOTH LOWER EXTREMITIES, LIMITED TO BREAKDOWN OF SKIN (HCC): Primary | ICD-10-CM

## 2025-03-12 PROCEDURE — 12001 RPR S/N/AX/GEN/TRNK 2.5CM/<: CPT

## 2025-03-12 PROCEDURE — 11042 DBRDMT SUBQ TIS 1ST 20SQCM/<: CPT

## 2025-03-12 RX ORDER — SILVER SULFADIAZINE 10 MG/G
CREAM TOPICAL PRN
OUTPATIENT
Start: 2025-03-12

## 2025-03-12 RX ORDER — MUPIROCIN 20 MG/G
OINTMENT TOPICAL PRN
OUTPATIENT
Start: 2025-03-12

## 2025-03-12 RX ORDER — BACITRACIN ZINC AND POLYMYXIN B SULFATE 500; 1000 [USP'U]/G; [USP'U]/G
OINTMENT TOPICAL PRN
OUTPATIENT
Start: 2025-03-12

## 2025-03-12 RX ORDER — LIDOCAINE HYDROCHLORIDE 20 MG/ML
JELLY TOPICAL PRN
OUTPATIENT
Start: 2025-03-12

## 2025-03-12 RX ORDER — GINSENG 100 MG
CAPSULE ORAL PRN
OUTPATIENT
Start: 2025-03-12

## 2025-03-12 RX ORDER — BETAMETHASONE DIPROPIONATE 0.5 MG/G
CREAM TOPICAL PRN
OUTPATIENT
Start: 2025-03-12

## 2025-03-12 RX ORDER — LIDOCAINE 40 MG/G
CREAM TOPICAL PRN
OUTPATIENT
Start: 2025-03-12

## 2025-03-12 RX ORDER — LIDOCAINE HYDROCHLORIDE 40 MG/ML
SOLUTION TOPICAL PRN
OUTPATIENT
Start: 2025-03-12

## 2025-03-12 RX ORDER — LIDOCAINE 50 MG/G
OINTMENT TOPICAL PRN
OUTPATIENT
Start: 2025-03-12

## 2025-03-12 RX ORDER — SODIUM CHLOR/HYPOCHLOROUS ACID 0.033 %
SOLUTION, IRRIGATION IRRIGATION PRN
OUTPATIENT
Start: 2025-03-12

## 2025-03-12 RX ORDER — GENTAMICIN SULFATE 1 MG/G
OINTMENT TOPICAL PRN
OUTPATIENT
Start: 2025-03-12

## 2025-03-12 RX ORDER — CLOBETASOL PROPIONATE 0.5 MG/G
OINTMENT TOPICAL PRN
OUTPATIENT
Start: 2025-03-12

## 2025-03-12 RX ORDER — TRIAMCINOLONE ACETONIDE 1 MG/G
OINTMENT TOPICAL PRN
OUTPATIENT
Start: 2025-03-12

## 2025-03-12 RX ORDER — NEOMYCIN/BACITRACIN/POLYMYXINB 3.5-400-5K
OINTMENT (GRAM) TOPICAL PRN
OUTPATIENT
Start: 2025-03-12

## 2025-03-12 ASSESSMENT — PAIN SCALES - GENERAL: PAINLEVEL_OUTOF10: 1

## 2025-03-12 NOTE — FLOWSHEET NOTE
03/12/25 1340   Right Leg Edema Point of Measurement   Leg circumference 31 cm   Ankle circumference 20 cm   Left Leg Edema Point of Measurement   Leg circumference 34.6 cm   Ankle circumference 21.2 cm   LLE Neurovascular Assessment   Capillary Refill Less than/Equal to 3 seconds   Color Appropriate for Ethnicity   Temperature Cool   L Pedal Pulse +1   [REMOVED] Wound 03/12/25 Knee Anterior #3 Left knee   Final Assessment Date/Final Assessment Time: 03/12/25 1329  Date First Assessed/Time First Assessed: 03/12/25 1329   Location: Knee  Wound Location Orientation: Anterior  Wound Description (Comments): #3 Left knee   Wound Image    Wound Length (cm) 1.4 cm   Wound Width (cm) 1.4 cm   Wound Depth (cm) 0.2 cm   Wound Surface Area (cm^2) 1.96 cm^2   Wound Volume (cm^3) 0.392 cm^3   Wound Assessment Epithelialization;Pink/red;Bleeding   Drainage Amount Small (< 25%)   Drainage Description Serous     BP (!) 159/73   Pulse 73   Temp 97.4 °F (36.3 °C) (Temporal)   Resp 18

## 2025-03-12 NOTE — PROGRESS NOTES
[facility]   Progress Note and Procedure Note   NO Procedure    Patient Name: Shira Yost  : 1940  MRN: [id number]    Past Medical History:   Diagnosis Date    Hypertension      Past Surgical History:   Procedure Laterality Date    CT BIOPSY BONE MARROW  2020    CT BONE MARROW BIOPSY 2020 [facility] RAD CT    HEENT      Appendectomy    HYSTERECTOMY (CERVIX STATUS UNKNOWN)      ORTHOPEDIC SURGERY      Bilateral hip replacement     No family history on file.  Social History     Tobacco Use    Smoking status: Never     Passive exposure: Past    Smokeless tobacco: Never   Vaping Use    Vaping status: Never Used   Substance Use Topics    Alcohol use: Not Currently    Drug use: Never     Allergies   Allergen Reactions    Nitrofurantoin Other (See Comments)     Red rash on arms and legs     Current Outpatient Medications on File Prior to Encounter   Medication Sig Dispense Refill    imatinib (GLEEVEC) 100 MG chemo tablet Take 2 tablets by mouth daily 60 tablet 2    RETAINE MGD 0.5-0.5 % ophthalmic emulsion INSTILL 1 DROP IN EACH EYE FOUR TIMES DAILY      amLODIPine (NORVASC) 5 MG tablet Take 1 tablet by mouth daily      calcium citrate-vitamin D (CITRACAL+D) 315-5 MG-MCG TABS per tablet Take 1 tablet by mouth daily (with breakfast)      lisinopril-hydroCHLOROthiazide (PRINZIDE;ZESTORETIC) 20-12.5 MG per tablet Take 1 tablet by mouth daily      traMADol (ULTRAM) 50 MG tablet ceived the following from [facility]: Outside name: traMADoL (ULTRAM) 50 mg tablet       No current facility-administered medications on file prior to encounter.     No results found for: \"LABA1C\"      Vitals:    25 1323   BP: (!) 159/73   Pulse: 73   Resp: 18   Temp: 97.4 °F (36.3 °C)      Wound 25 Leg Left;Anterior #1 (Active)   Wound Image   25 1331   Wound Cleansed Soap and water 25 1325   Dressing/Treatment Non adherent;Gauze dressing/dressing sponge;Roll

## 2025-03-12 NOTE — FLOWSHEET NOTE
03/12/25 1331   Wound 02/26/25 Leg Left;Anterior #1   Date First Assessed/Time First Assessed: 02/26/25 1322   Present on Original Admission: Yes  Location: Leg  Wound Location Orientation: Left;Anterior  Wound Description (Comments): #1   Wound Image    Wound Length (cm) 0.1 cm   Wound Width (cm) 0.1 cm   Wound Depth (cm) 0.1 cm   Wound Surface Area (cm^2) 0.01 cm^2   Change in Wound Size % (l*w) 99.89   Wound Volume (cm^3) 0.001 cm^3   Wound Assessment Pink/red   Drainage Amount None (dry)   Odor None   Margins Flat/open edges   Wound Thickness Description not for Pressure Injury Partial thickness   Wound 02/26/25 Leg Right;Anterior #2   Date First Assessed/Time First Assessed: 02/26/25 1327   Present on Original Admission: Yes  Location: Leg  Wound Location Orientation: Right;Anterior  Wound Description (Comments): #2   Wound Image    Wound Length (cm) 0 cm   Wound Width (cm) 0 cm   Wound Depth (cm) 0 cm   Wound Surface Area (cm^2) 0 cm^2   Change in Wound Size % (l*w) 100   Wound Volume (cm^3) 0 cm^3   Wound Assessment Epithelialization

## 2025-03-12 NOTE — FLOWSHEET NOTE
03/12/25 1514   Right Leg Edema Point of Measurement   Compression Therapy Compression ordered   Left Leg Edema Point of Measurement   Compression Therapy Tubular elastic support bandage   Wound 03/12/25 Knee Anterior #3 Left knee   Date First Assessed/Time First Assessed: 03/12/25 1329   Location: Knee  Wound Location Orientation: Anterior  Wound Description (Comments): #3 Left knee   Dressing/Treatment Non adherent;Gauze dressing/dressing sponge;Roll gauze;Tape/Soft cloth adhesive tape  (betamethasone)   Wound 02/26/25 Leg Left;Anterior #1   Date First Assessed/Time First Assessed: 02/26/25 1322   Present on Original Admission: Yes  Location: Leg  Wound Location Orientation: Left;Anterior  Wound Description (Comments): #1   Dressing/Treatment Non adherent;Gauze dressing/dressing sponge;Roll gauze;Tape/Soft cloth adhesive tape  (betamethasone)     Discharge Condition: Stable    Pain: 5    Ambulatory Status:Walking    Discharge Destination: Home    Transportation:Car    Accompanied by: Self    Discharge instructions reviewed with Self and copy or written instructions have been provided. All questions/concerns have been addressed at this time.

## 2025-03-12 NOTE — PATIENT INSTRUCTIONS
Discharge Instructions for  [facility]  [address] [place] [address]  Telephone: [phone]   FAX [phone]    NAME:  Shira Yost  YOB: 1940  ACCOUNT NUMBER :  [id number]  DATE:  3/12/2025       :VERA MARI RN       WOUND SUPPLIES/ DME company : [employer]         Wound Cleansing:   Do not scrub or use excessive force.  Cleanse wound prior to applying a clean dressing with: Try to avoid white soaps to shower with          [x] Shower on days of dressing change, remove dressing first [x] Cleanse wounds with: Eulogio and Eulogio baby shampoo lather leave 2-3 min then rinse with water          Topical Treatments:  Do not apply lotions, creams, or ointments to wound bed unless directed.     [x] Apply A&D ointment to dry, flaky skin surrounding the wound prior to dressing change.      [x] Apply Betamethasone to left lower leg in clinic only      Dressings:           Wound Location :  Left Lower leg wounds   Apply Primary Dressing:       [] MediHoney Gel    [] MediHoney Alginate  [] Alginate     [] Alginate with Silver       [] Collagen   [] Collagen with Silver     [] Hydrocolloid  [] Hydrofera Blue Classic (moisten with saline)  [] Hydrofera Blue Ready  [x] Other:  Adaptic   [] Pack wound loosely with      Cover and Secure with:    [x] Gauze   [x] Jerald   [] Kerlix  [] Ace Wrap     [] ABD  [] Medipore tape  [x] tape  [] Other:    Avoid contact of tape with skin.    Change dressing:   [] Daily      [] Every Other Day   [x] Three times per week  [] Once a week   [] Do Not Change Dressing     [] Other:    Compression:  Apply:  [x] Compression Applied in Clinic: Other: [x]Right Leg (May start using own compression on this leg)  [x]Left Leg double tubi above knee   [] Bilateral legs double tubi      Activity:  Activity as tolerated:    [x] Patient has no activity restrictions       [] Strict Bedrest:   [] Remain off Work:     [] Return to

## 2025-03-26 ENCOUNTER — HOSPITAL ENCOUNTER (OUTPATIENT)
Facility: HOSPITAL | Age: 85
Discharge: HOME | End: 2025-03-26
Attending: EMERGENCY MEDICINE
Payer: MEDICARE

## 2025-03-26 VITALS
HEART RATE: 79 BPM | SYSTOLIC BLOOD PRESSURE: 163 MMHG | DIASTOLIC BLOOD PRESSURE: 50 MMHG | RESPIRATION RATE: 18 BRPM | TEMPERATURE: 96.4 F

## 2025-03-26 DIAGNOSIS — L97.911 ULCERS OF BOTH LOWER EXTREMITIES, LIMITED TO BREAKDOWN OF SKIN: Primary | ICD-10-CM

## 2025-03-26 DIAGNOSIS — L97.921 ULCERS OF BOTH LOWER EXTREMITIES, LIMITED TO BREAKDOWN OF SKIN: Primary | ICD-10-CM

## 2025-03-26 PROCEDURE — 99212 OFFICE O/P EST SF 10 MIN: CPT

## 2025-03-26 RX ORDER — TRIAMCINOLONE ACETONIDE 1 MG/G
OINTMENT TOPICAL PRN
OUTPATIENT
Start: 2025-03-26

## 2025-03-26 RX ORDER — GINSENG 100 MG
CAPSULE ORAL PRN
OUTPATIENT
Start: 2025-03-26

## 2025-03-26 RX ORDER — CLOBETASOL PROPIONATE 0.5 MG/G
OINTMENT TOPICAL PRN
OUTPATIENT
Start: 2025-03-26

## 2025-03-26 RX ORDER — BACITRACIN ZINC AND POLYMYXIN B SULFATE 500; 1000 [USP'U]/G; [USP'U]/G
OINTMENT TOPICAL PRN
OUTPATIENT
Start: 2025-03-26

## 2025-03-26 RX ORDER — BETAMETHASONE DIPROPIONATE 0.5 MG/G
CREAM TOPICAL PRN
OUTPATIENT
Start: 2025-03-26

## 2025-03-26 RX ORDER — LIDOCAINE HYDROCHLORIDE 40 MG/ML
SOLUTION TOPICAL PRN
OUTPATIENT
Start: 2025-03-26

## 2025-03-26 RX ORDER — LIDOCAINE 40 MG/G
CREAM TOPICAL PRN
OUTPATIENT
Start: 2025-03-26

## 2025-03-26 RX ORDER — MUPIROCIN 20 MG/G
OINTMENT TOPICAL PRN
OUTPATIENT
Start: 2025-03-26

## 2025-03-26 RX ORDER — LIDOCAINE HYDROCHLORIDE 20 MG/ML
JELLY TOPICAL PRN
OUTPATIENT
Start: 2025-03-26

## 2025-03-26 RX ORDER — GENTAMICIN SULFATE 1 MG/G
OINTMENT TOPICAL PRN
OUTPATIENT
Start: 2025-03-26

## 2025-03-26 RX ORDER — LIDOCAINE 50 MG/G
OINTMENT TOPICAL PRN
OUTPATIENT
Start: 2025-03-26

## 2025-03-26 RX ORDER — NEOMYCIN/BACITRACIN/POLYMYXINB 3.5-400-5K
OINTMENT (GRAM) TOPICAL PRN
OUTPATIENT
Start: 2025-03-26

## 2025-03-26 RX ORDER — SILVER SULFADIAZINE 10 MG/G
CREAM TOPICAL PRN
OUTPATIENT
Start: 2025-03-26

## 2025-03-26 RX ORDER — SODIUM CHLOR/HYPOCHLOROUS ACID 0.033 %
SOLUTION, IRRIGATION IRRIGATION PRN
OUTPATIENT
Start: 2025-03-26

## 2025-03-26 ASSESSMENT — PAIN DESCRIPTION - LOCATION: LOCATION: GENERALIZED

## 2025-03-26 ASSESSMENT — PAIN SCALES - GENERAL: PAINLEVEL_OUTOF10: 2

## 2025-03-26 NOTE — PROGRESS NOTES
[facility]   Progress Note and Procedure Note   NO Procedure    Patient Name: Shira Yost  : 1940  MRN: [id number]    Past Medical History:   Diagnosis Date    Hypertension      Past Surgical History:   Procedure Laterality Date    CT BIOPSY BONE MARROW  2020    CT BONE MARROW BIOPSY 2020 [facility] RAD CT    HEENT      Appendectomy    HYSTERECTOMY (CERVIX STATUS UNKNOWN)      ORTHOPEDIC SURGERY      Bilateral hip replacement     History reviewed. No pertinent family history.  Social History     Tobacco Use    Smoking status: Never     Passive exposure: Past    Smokeless tobacco: Never   Vaping Use    Vaping status: Never Used   Substance Use Topics    Alcohol use: Not Currently    Drug use: Never     Allergies   Allergen Reactions    Nitrofurantoin Other (See Comments)     Red rash on arms and legs     Current Outpatient Medications on File Prior to Encounter   Medication Sig Dispense Refill    imatinib (GLEEVEC) 100 MG chemo tablet Take 2 tablets by mouth daily 60 tablet 2    RETAINE MGD 0.5-0.5 % ophthalmic emulsion INSTILL 1 DROP IN EACH EYE FOUR TIMES DAILY      amLODIPine (NORVASC) 5 MG tablet Take 1 tablet by mouth daily      calcium citrate-vitamin D (CITRACAL+D) 315-5 MG-MCG TABS per tablet Take 1 tablet by mouth daily (with breakfast)      lisinopril-hydroCHLOROthiazide (PRINZIDE;ZESTORETIC) 20-12.5 MG per tablet Take 1 tablet by mouth daily      traMADol (ULTRAM) 50 MG tablet ceived the following from [facility]: Outside name: traMADoL (ULTRAM) 50 mg tablet       No current facility-administered medications on file prior to encounter.     No results found for: \"LABA1C\"      Vitals:    25 1330   BP: (!) 163/50   Pulse: 79   Resp: 18   Temp: (!) 96.4 °F (35.8 °C)                   I have noted, and reviewed today's data for this patient in [facility] and concur with same. The focused physical exam, other physical findings, Medical history,

## 2025-03-26 NOTE — FLOWSHEET NOTE
03/26/25 1330   LLE Neurovascular Assessment   Capillary Refill Less than/Equal to 3 seconds   Color Appropriate for Ethnicity   Temperature Cool   L Pedal Pulse +2   Wound 02/26/25 Leg Left;Anterior #1   Date First Assessed/Time First Assessed: 02/26/25 1322   Present on Original Admission: Yes  Location: Leg  Wound Location Orientation: Left;Anterior  Wound Description (Comments): #1   Wound Image    Wound Length (cm) 0 cm   Wound Width (cm) 0 cm   Wound Depth (cm) 0 cm   Wound Surface Area (cm^2) 0 cm^2   Change in Wound Size % (l*w) 100   Wound Volume (cm^3) 0 cm^3   Wound Assessment Epithelialization   Drainage Amount None (dry)   Odor None   Adry-wound Assessment Intact   Margins Attached edges   Wound 03/12/25 Knee Anterior #3 Left knee   Date First Assessed/Time First Assessed: 03/12/25 1329   Location: Knee  Wound Location Orientation: Anterior  Wound Description (Comments): #3 Left knee   Wound Image    Wound Length (cm) 0.1 cm   Wound Width (cm) 0.1 cm   Wound Depth (cm) 0.1 cm   Wound Surface Area (cm^2) 0.01 cm^2   Change in Wound Size % (l*w) 99.49   Wound Volume (cm^3) 0.001 cm^3   Wound Healing % 100   Wound Assessment Epithelialization;Pink/red   Drainage Amount None (dry)   Odor None   Adry-wound Assessment Fragile   Margins Flat/open edges   Pain Assessment   Pain Assessment 0-10   Pain Level 2   Pain Location Generalized     BP (!) 163/50   Pulse 79   Temp (!) 96.4 °F (35.8 °C) (Temporal)   Resp 18

## 2025-03-26 NOTE — PATIENT INSTRUCTIONS
[facility] THANK YOU      Your treatment has been completed at [facility] outpatient wound clinic on 3/26/2025  , per Dr. Witt.     We know patients have several options when choosing a health care provider. We would like to express our sincere appreciation for having had the chance to be yours. More importantly, we enjoy having you as part of our family.     We also hope your experience with us has surpassed your expectations and that you have been pleased with our service. We appreciate the confidence you've shown in selecting us as your health care provider and we will continue or commitment to provide the highest quality of service.      Again, thank you for choosing us for your health care needs. If you have any further questions or concerns, please call [phone]. It has been our pleasure serving you and we hope to continue our relationship in the future, if the need occurs.       Sincerely,    [facility] Team       Keep covered with dressing for next 2 weeks change daily to every other day.

## 2025-04-04 LAB
ALBUMIN SERPL-MCNC: 4.6 G/DL (ref 3.7–4.7)
ALP SERPL-CCNC: 55 IU/L (ref 44–121)
ALT SERPL-CCNC: 19 IU/L (ref 0–32)
AST SERPL-CCNC: 20 IU/L (ref 0–40)
BASOPHILS # BLD AUTO: 0 X10E3/UL (ref 0–0.2)
BASOPHILS NFR BLD AUTO: 1 %
BILIRUB SERPL-MCNC: <0.2 MG/DL (ref 0–1.2)
BUN SERPL-MCNC: 17 MG/DL (ref 8–27)
BUN/CREAT SERPL: 22 (ref 12–28)
CALCIUM SERPL-MCNC: 9.4 MG/DL (ref 8.7–10.3)
CHLORIDE SERPL-SCNC: 100 MMOL/L (ref 96–106)
CO2 SERPL-SCNC: 27 MMOL/L (ref 20–29)
CREAT SERPL-MCNC: 0.76 MG/DL (ref 0.57–1)
EGFRCR SERPLBLD CKD-EPI 2021: 78 ML/MIN/1.73
EOSINOPHIL # BLD AUTO: 0.2 X10E3/UL (ref 0–0.4)
EOSINOPHIL NFR BLD AUTO: 3 %
ERYTHROCYTE [DISTWIDTH] IN BLOOD BY AUTOMATED COUNT: 13.1 % (ref 11.7–15.4)
GLOBULIN SER CALC-MCNC: 1.9 G/DL (ref 1.5–4.5)
GLUCOSE SERPL-MCNC: 124 MG/DL (ref 70–99)
HCT VFR BLD AUTO: 31.9 % (ref 34–46.6)
HGB BLD-MCNC: 10.7 G/DL (ref 11.1–15.9)
IMM GRANULOCYTES # BLD AUTO: 0 X10E3/UL (ref 0–0.1)
IMM GRANULOCYTES NFR BLD AUTO: 0 %
LYMPHOCYTES # BLD AUTO: 1.2 X10E3/UL (ref 0.7–3.1)
LYMPHOCYTES NFR BLD AUTO: 23 %
MCH RBC QN AUTO: 31.8 PG (ref 26.6–33)
MCHC RBC AUTO-ENTMCNC: 33.5 G/DL (ref 31.5–35.7)
MCV RBC AUTO: 95 FL (ref 79–97)
MONOCYTES # BLD AUTO: 0.9 X10E3/UL (ref 0.1–0.9)
MONOCYTES NFR BLD AUTO: 17 %
NEUTROPHILS # BLD AUTO: 3.1 X10E3/UL (ref 1.4–7)
NEUTROPHILS NFR BLD AUTO: 56 %
PLATELET # BLD AUTO: 232 X10E3/UL (ref 150–450)
POTASSIUM SERPL-SCNC: 4.5 MMOL/L (ref 3.5–5.2)
PROT SERPL-MCNC: 6.5 G/DL (ref 6–8.5)
RBC # BLD AUTO: 3.36 X10E6/UL (ref 3.77–5.28)
SODIUM SERPL-SCNC: 141 MMOL/L (ref 134–144)
WBC # BLD AUTO: 5.4 X10E3/UL (ref 3.4–10.8)

## 2025-04-07 LAB
LDH SERPL L TO P-CCNC: 281 IU/L (ref 119–226)
LDH1 CFR SERPL ELPH: 20 % (ref 17–32)
LDH2 CFR SERPL ELPH: 34 % (ref 25–40)
LDH3 CFR SERPL ELPH: 25 % (ref 17–27)
LDH4 CFR SERPL ELPH: 12 % (ref 5–13)
LDH5 CFR SERPL ELPH: 9 % (ref 4–20)

## 2025-04-10 LAB
LAB DIRECTOR NAME PROVIDER: ABNORMAL
LABORATORY COMMENT REPORT: ABNORMAL
OBSERVATION IMP: POSITIVE
REF LAB TEST METHOD: ABNORMAL
T(ABL1,BCR)B2A2/CONTROL BLD/T: 0.01 %
T(ABL1,BCR)B3A2/CONTROL BLD/T: 0.49 %
T(ABL1,BCR)E1A2/CONTROL BLD/T: ABNORMAL %
T(ABL1,BCR)E1A2/CONTROL BLD/T: ABNORMAL %

## 2025-05-06 ENCOUNTER — OFFICE VISIT (OUTPATIENT)
Age: 85
End: 2025-05-06
Payer: MEDICARE

## 2025-05-06 VITALS
SYSTOLIC BLOOD PRESSURE: 173 MMHG | OXYGEN SATURATION: 97 % | BODY MASS INDEX: 28.17 KG/M2 | RESPIRATION RATE: 16 BRPM | TEMPERATURE: 97.6 F | WEIGHT: 149.2 LBS | DIASTOLIC BLOOD PRESSURE: 77 MMHG | HEIGHT: 61 IN | HEART RATE: 72 BPM

## 2025-05-06 DIAGNOSIS — D64.9 NORMOCYTIC ANEMIA: ICD-10-CM

## 2025-05-06 DIAGNOSIS — C92.10 CHRONIC MYELOID LEUKEMIA, BCR/ABL-POSITIVE, NOT HAVING ACHIEVED REMISSION (HCC): Primary | ICD-10-CM

## 2025-05-06 DIAGNOSIS — Z79.899 ENCOUNTER FOR LONG-TERM (CURRENT) USE OF HIGH-RISK MEDICATION: ICD-10-CM

## 2025-05-06 DIAGNOSIS — K52.9 CHRONIC DIARRHEA: ICD-10-CM

## 2025-05-06 DIAGNOSIS — R60.0 BILATERAL LOWER EXTREMITY EDEMA: ICD-10-CM

## 2025-05-06 DIAGNOSIS — R21 RASH: ICD-10-CM

## 2025-05-06 PROCEDURE — 99214 OFFICE O/P EST MOD 30 MIN: CPT | Performed by: NURSE PRACTITIONER

## 2025-05-06 PROCEDURE — G8427 DOCREV CUR MEDS BY ELIG CLIN: HCPCS | Performed by: NURSE PRACTITIONER

## 2025-05-06 PROCEDURE — 1036F TOBACCO NON-USER: CPT | Performed by: NURSE PRACTITIONER

## 2025-05-06 PROCEDURE — 1160F RVW MEDS BY RX/DR IN RCRD: CPT | Performed by: NURSE PRACTITIONER

## 2025-05-06 PROCEDURE — G8400 PT W/DXA NO RESULTS DOC: HCPCS | Performed by: NURSE PRACTITIONER

## 2025-05-06 PROCEDURE — 1123F ACP DISCUSS/DSCN MKR DOCD: CPT | Performed by: NURSE PRACTITIONER

## 2025-05-06 PROCEDURE — 1090F PRES/ABSN URINE INCON ASSESS: CPT | Performed by: NURSE PRACTITIONER

## 2025-05-06 PROCEDURE — G8419 CALC BMI OUT NRM PARAM NOF/U: HCPCS | Performed by: NURSE PRACTITIONER

## 2025-05-06 PROCEDURE — 1159F MED LIST DOCD IN RCRD: CPT | Performed by: NURSE PRACTITIONER

## 2025-05-06 PROCEDURE — 1126F AMNT PAIN NOTED NONE PRSNT: CPT | Performed by: NURSE PRACTITIONER

## 2025-05-06 RX ORDER — BENZOCAINE/MENTHOL 6 MG-10 MG
LOZENGE MUCOUS MEMBRANE
Qty: 56 G | Refills: 1 | Status: SHIPPED | OUTPATIENT
Start: 2025-05-06 | End: 2025-05-13

## 2025-05-06 ASSESSMENT — PATIENT HEALTH QUESTIONNAIRE - PHQ9
SUM OF ALL RESPONSES TO PHQ QUESTIONS 1-9: 0
2. FEELING DOWN, DEPRESSED OR HOPELESS: NOT AT ALL
1. LITTLE INTEREST OR PLEASURE IN DOING THINGS: NOT AT ALL
SUM OF ALL RESPONSES TO PHQ QUESTIONS 1-9: 0

## 2025-05-06 NOTE — PROGRESS NOTES
Shira Yost is a 84 y.o. female follow up for         1. Have you been to the ER, urgent care clinic since your last visit?  Hospitalized since your last visit?{no    2. Have you seen or consulted any other health care providers outside of the [facility] since your last visit?  Include any pap smears or colon screening. no  
(b3a2, p210) 34.4; e1a2 (p190) 0.0175 fusion transcripts.   21: POSITIVE for the BCR-ABL1:  e13a2 (b2a2, p210) 0.0614 and e14a2 (b3a2, p210) 0.4772 fusion transcripts.   21: POSITIVE for the BCR-ABL1:  e13a2 (b2a2, p210) 0.1481 and e14a2 (b3a2, p210) 1.4140 fusion transcripts.   21: POSITIVE for the BCR-ABL1:  e13a2 (b2a2, p210) 0.0381 and e14a2 (b3a2, p210) 0.3430 fusion transcripts.   22: POSITIVE for the BCR-ABL1:  e13a2 (b2a2, p210) 0.1742 and e14a2 (b3a2, p210) 5.3332 fusion transcripts.   22: POSITIVE for the BCR-ABL1:  e13a2 (b2a2, p210) 0.1770 and e14a2 (b3a2, p210) 1.9828 fusion transcripts.  08/15/22 POSITIVE for the BCR-ABL1:  e13a2 (b2a2, p210) < 0.0032% and e14a2 (b3a2, p210) 0.6344 fusion transcripts  22 POSITIVE for BCR-ABL1:  e13a2 (b2a2, p210)  0.4064 and e14a2 (b3a2, p210) 2.9292 fusion transcripts  23 POSITIVE for the BCR-ABL1:  e13a2 (b2a2, p210) 0.0592 and e14a2 (b3a2, p210) 0.2734 fusion transcripts  23 POSITIVE for the BCR-ABL1 e14a2 (b3a2, p210) 0.1812 fusion transcript.  23 POSITIVE for the BCR-ABL1 e14a2 (b3a2, p210) 0.3318 fusion transcript  10/23/23 POSITIVE for the BCR-ABL1 e13a2 (b2a2, p210) 0.0614 and e14a2 (b3a2, p210) 0.6754 fusion transcripts.  23 POSITIVE for the BCR-ABL1 e13a2 (b2a2, p210)  0.1202 and e14a2 (b3a2, p210) 2.2401 fusion transcripts.   04/15/24 POSITIVE for the BCR-ABL1 e14a2 (b3a2, p210) 0.2454   07/15/24 POSITIVE for the BCR-ABL1 e14a2 (b3a2, p210) 0.0083  10/14/24 POSITIVE for the BCR-ABL1 e14a2 (b3a2, p210) 0.3142   25 POSITIVE for the BCR-ABL1 e13a2 (b2a2, p210) 0.0064 and e14a2 (b3a2, p210) 0.1289   25 POSITIVE for BCR-ABL1 e13a2 (b2a2, p210) 0.0092 and e14a2 (b3a2, p210) 0.4897       Imagin/28/20 abdomen ultrasound:  IMPRESSION: Hepatic steatosis. No evidence for hepatosplenomegaly    Assessment & Plan:   Shira Yost is a 84 y.o. female is seen for evaluation of CML.     1. CML:

## 2025-05-12 DIAGNOSIS — C92.10 CHRONIC MYELOID LEUKEMIA, BCR/ABL-POSITIVE, NOT HAVING ACHIEVED REMISSION (HCC): ICD-10-CM

## 2025-05-13 RX ORDER — IMATINIB MESYLATE 100 MG/1
200 TABLET, FILM COATED ORAL DAILY
Qty: 60 TABLET | Refills: 2 | Status: ACTIVE | OUTPATIENT
Start: 2025-05-13

## 2025-07-01 DIAGNOSIS — C92.10 CHRONIC MYELOID LEUKEMIA, BCR/ABL-POSITIVE, NOT HAVING ACHIEVED REMISSION (HCC): ICD-10-CM

## 2025-07-02 DIAGNOSIS — C92.10 CHRONIC MYELOID LEUKEMIA, BCR/ABL-POSITIVE, NOT HAVING ACHIEVED REMISSION (HCC): ICD-10-CM

## 2025-07-02 LAB
ALBUMIN SERPL-MCNC: 4.5 G/DL (ref 3.7–4.7)
ALP SERPL-CCNC: 64 IU/L (ref 44–121)
ALT SERPL-CCNC: 17 IU/L (ref 0–32)
AST SERPL-CCNC: 18 IU/L (ref 0–40)
BASOPHILS # BLD AUTO: 0 X10E3/UL (ref 0–0.2)
BASOPHILS NFR BLD AUTO: 1 %
BILIRUB SERPL-MCNC: <0.2 MG/DL (ref 0–1.2)
BUN SERPL-MCNC: 15 MG/DL (ref 8–27)
BUN/CREAT SERPL: 15 (ref 12–28)
CALCIUM SERPL-MCNC: 9.6 MG/DL (ref 8.7–10.3)
CHLORIDE SERPL-SCNC: 99 MMOL/L (ref 96–106)
CO2 SERPL-SCNC: 25 MMOL/L (ref 20–29)
CREAT SERPL-MCNC: 1 MG/DL (ref 0.57–1)
EGFRCR SERPLBLD CKD-EPI 2021: 56 ML/MIN/1.73
EOSINOPHIL # BLD AUTO: 0.1 X10E3/UL (ref 0–0.4)
EOSINOPHIL NFR BLD AUTO: 2 %
ERYTHROCYTE [DISTWIDTH] IN BLOOD BY AUTOMATED COUNT: 13.6 % (ref 11.7–15.4)
GLOBULIN SER CALC-MCNC: 2 G/DL (ref 1.5–4.5)
GLUCOSE SERPL-MCNC: 117 MG/DL (ref 70–99)
HCT VFR BLD AUTO: 32.3 % (ref 34–46.6)
HGB BLD-MCNC: 10.7 G/DL (ref 11.1–15.9)
IMM GRANULOCYTES # BLD AUTO: 0 X10E3/UL (ref 0–0.1)
IMM GRANULOCYTES NFR BLD AUTO: 0 %
LDH SERPL L TO P-CCNC: 285 IU/L (ref 119–226)
LYMPHOCYTES # BLD AUTO: 1.1 X10E3/UL (ref 0.7–3.1)
LYMPHOCYTES NFR BLD AUTO: 18 %
MCH RBC QN AUTO: 32.1 PG (ref 26.6–33)
MCHC RBC AUTO-ENTMCNC: 33.1 G/DL (ref 31.5–35.7)
MCV RBC AUTO: 97 FL (ref 79–97)
MONOCYTES # BLD AUTO: 1 X10E3/UL (ref 0.1–0.9)
MONOCYTES NFR BLD AUTO: 17 %
NEUTROPHILS # BLD AUTO: 3.8 X10E3/UL (ref 1.4–7)
NEUTROPHILS NFR BLD AUTO: 62 %
PLATELET # BLD AUTO: 238 X10E3/UL (ref 150–450)
POTASSIUM SERPL-SCNC: 4.1 MMOL/L (ref 3.5–5.2)
PROT SERPL-MCNC: 6.5 G/DL (ref 6–8.5)
RBC # BLD AUTO: 3.33 X10E6/UL (ref 3.77–5.28)
SODIUM SERPL-SCNC: 138 MMOL/L (ref 134–144)
WBC # BLD AUTO: 6.1 X10E3/UL (ref 3.4–10.8)

## 2025-07-02 RX ORDER — IMATINIB MESYLATE 100 MG/1
200 TABLET, FILM COATED ORAL DAILY
Qty: 60 TABLET | Refills: 2 | Status: ACTIVE | OUTPATIENT
Start: 2025-07-02

## 2025-07-08 LAB
LAB DIRECTOR NAME PROVIDER: ABNORMAL
LABORATORY COMMENT REPORT: ABNORMAL
OBSERVATION IMP: POSITIVE
REF LAB TEST METHOD: ABNORMAL
T(ABL1,BCR)B2A2/CONTROL BLD/T: 0.01 %
T(ABL1,BCR)B3A2/CONTROL BLD/T: 0.1 %
T(ABL1,BCR)E1A2/CONTROL BLD/T: ABNORMAL %
T(ABL1,BCR)E1A2/CONTROL BLD/T: ABNORMAL %

## 2025-07-15 ENCOUNTER — OFFICE VISIT (OUTPATIENT)
Age: 85
End: 2025-07-15
Payer: MEDICARE

## 2025-07-15 VITALS
HEART RATE: 78 BPM | OXYGEN SATURATION: 97 % | SYSTOLIC BLOOD PRESSURE: 146 MMHG | WEIGHT: 147 LBS | HEIGHT: 61 IN | DIASTOLIC BLOOD PRESSURE: 71 MMHG | BODY MASS INDEX: 27.75 KG/M2 | RESPIRATION RATE: 16 BRPM | TEMPERATURE: 97.9 F

## 2025-07-15 DIAGNOSIS — C92.10 CHRONIC MYELOID LEUKEMIA, BCR/ABL-POSITIVE, NOT HAVING ACHIEVED REMISSION (HCC): Primary | ICD-10-CM

## 2025-07-15 DIAGNOSIS — H11.31 SUBCONJUNCTIVAL HEMORRHAGE OF RIGHT EYE: ICD-10-CM

## 2025-07-15 DIAGNOSIS — R60.0 BILATERAL LOWER EXTREMITY EDEMA: ICD-10-CM

## 2025-07-15 DIAGNOSIS — R21 RASH: ICD-10-CM

## 2025-07-15 DIAGNOSIS — Z51.11 ENCOUNTER FOR ANTINEOPLASTIC CHEMOTHERAPY: ICD-10-CM

## 2025-07-15 PROCEDURE — G8400 PT W/DXA NO RESULTS DOC: HCPCS | Performed by: NURSE PRACTITIONER

## 2025-07-15 PROCEDURE — 1123F ACP DISCUSS/DSCN MKR DOCD: CPT | Performed by: NURSE PRACTITIONER

## 2025-07-15 PROCEDURE — 1159F MED LIST DOCD IN RCRD: CPT | Performed by: NURSE PRACTITIONER

## 2025-07-15 PROCEDURE — G8419 CALC BMI OUT NRM PARAM NOF/U: HCPCS | Performed by: NURSE PRACTITIONER

## 2025-07-15 PROCEDURE — 99215 OFFICE O/P EST HI 40 MIN: CPT | Performed by: NURSE PRACTITIONER

## 2025-07-15 PROCEDURE — G8427 DOCREV CUR MEDS BY ELIG CLIN: HCPCS | Performed by: NURSE PRACTITIONER

## 2025-07-15 PROCEDURE — 1036F TOBACCO NON-USER: CPT | Performed by: NURSE PRACTITIONER

## 2025-07-15 PROCEDURE — 1126F AMNT PAIN NOTED NONE PRSNT: CPT | Performed by: NURSE PRACTITIONER

## 2025-07-15 PROCEDURE — G2211 COMPLEX E/M VISIT ADD ON: HCPCS | Performed by: NURSE PRACTITIONER

## 2025-07-15 PROCEDURE — 1090F PRES/ABSN URINE INCON ASSESS: CPT | Performed by: NURSE PRACTITIONER

## 2025-07-15 PROCEDURE — 1160F RVW MEDS BY RX/DR IN RCRD: CPT | Performed by: NURSE PRACTITIONER

## 2025-07-15 ASSESSMENT — PATIENT HEALTH QUESTIONNAIRE - PHQ9
SUM OF ALL RESPONSES TO PHQ QUESTIONS 1-9: 0
1. LITTLE INTEREST OR PLEASURE IN DOING THINGS: NOT AT ALL
SUM OF ALL RESPONSES TO PHQ QUESTIONS 1-9: 0
2. FEELING DOWN, DEPRESSED OR HOPELESS: NOT AT ALL
SUM OF ALL RESPONSES TO PHQ QUESTIONS 1-9: 0
SUM OF ALL RESPONSES TO PHQ QUESTIONS 1-9: 0

## 2025-07-15 NOTE — PROGRESS NOTES
Shira Yost is a 85 y.o. female follow up for         1. Have you been to the ER, urgent care clinic since your last visit?  Hospitalized since your last visit?{no    2. Have you seen or consulted any other health care providers outside of the [facility] since your last visit?  Include any pap smears or colon screening. no

## 2025-07-15 NOTE — PROGRESS NOTES
[facility]  [facility] [place]  [phone]    Hematology / Oncology Established Visit    Reason for Visit:   Shira Yost is a 85 y.o. female who is seen for follow up of CML. PCP is Dr. Rosmery Barnes.     Hematology Oncology Treatment History:     Diagnosis: Chronic myelogenous leukemia (CML)    Stage: N/A    Pathology:   - 8/1/20 Peripheral blood BCR-ABL1 FISH: 84% of nuclei positive for BCR/ABL1 fusion    -8/28/20 Bone marrow biopsy: Chronic myeloid leukemia, BCR-ABL1-positive, chronic phase   The bone marrow biopsy is 90% cellular and adequate for evaluation. The myeloid series appears hyperplastic and left-shifted with increased proportion of promyelocytes and myelocytes. Blasts do not appear increased. The erythroid series is reduced in number but progresses through the full maturation sequence without significant dysplasia. Megakaryocytes are increased in number with dwarf forms seen. Plasma cells and lymphocytes do not appear increased in number.   -Cytogenetics: Karyotype: 46,XX,t(9;22)(q34.1;q11.2)[20]; Interpretation: ABNORMAL FEMALE KARYOTYPE   The t(9;22)(q34.1;q11.2), resulting in formation of the [place] chromosome, is observed in 90-95% of cases with chronic myeloid leukemia, BCR-ABL1 positive. It is also observed in acute lymphoblastic leukemia/lymphoma and mixed phenotype acute leukemia (MPAL) with t(9;22)(q34.1;q11.2);BCR-ABL1 and in 1-2% of AML. These results should be nterpreted in the context of clinical and histopathologic findings..    Prior Treatment: Dasatinib 100mg PO daily, started 9/29/2020 - mid Oct 2020 (discontinued for mood changes, fluid retention).    Current Treatment: Gleevec 3/2021 - current  Started at 400mg daily initially then quickly dose reduced to 200mg due to rash       History of Present Illness:   Shira Yost is a 85 y.o. female with HTN, Hyperlipidemia, allergic rhinitis, GERD comes in for follow up of CML. Per PCP notes, pt